# Patient Record
Sex: FEMALE | Race: WHITE | NOT HISPANIC OR LATINO | Employment: OTHER | URBAN - METROPOLITAN AREA
[De-identification: names, ages, dates, MRNs, and addresses within clinical notes are randomized per-mention and may not be internally consistent; named-entity substitution may affect disease eponyms.]

---

## 2017-03-17 ENCOUNTER — COMMUNICATION - HEALTHEAST (OUTPATIENT)
Dept: FAMILY MEDICINE | Facility: CLINIC | Age: 67
End: 2017-03-17

## 2017-08-04 ENCOUNTER — RECORDS - HEALTHEAST (OUTPATIENT)
Dept: ADMINISTRATIVE | Facility: OTHER | Age: 67
End: 2017-08-04

## 2017-10-03 ENCOUNTER — RECORDS - HEALTHEAST (OUTPATIENT)
Dept: ADMINISTRATIVE | Facility: OTHER | Age: 67
End: 2017-10-03

## 2017-10-18 ENCOUNTER — HOSPITAL ENCOUNTER (OUTPATIENT)
Dept: MAMMOGRAPHY | Facility: CLINIC | Age: 67
Discharge: HOME | End: 2017-10-18
Attending: FAMILY MEDICINE

## 2017-10-18 DIAGNOSIS — Z12.31 VISIT FOR SCREENING MAMMOGRAM: ICD-10-CM

## 2017-10-19 ENCOUNTER — COMMUNICATION - HEALTHEAST (OUTPATIENT)
Dept: MAMMOGRAPHY | Facility: CLINIC | Age: 67
End: 2017-10-19

## 2017-10-26 ENCOUNTER — HOSPITAL ENCOUNTER (OUTPATIENT)
Dept: ULTRASOUND IMAGING | Facility: CLINIC | Age: 67
Discharge: HOME | End: 2017-10-26
Attending: FAMILY MEDICINE

## 2017-10-26 ENCOUNTER — HOSPITAL ENCOUNTER (OUTPATIENT)
Dept: MAMMOGRAPHY | Facility: CLINIC | Age: 67
Discharge: HOME | End: 2017-10-26
Attending: FAMILY MEDICINE

## 2017-10-26 DIAGNOSIS — N64.89 BREAST ASYMMETRY: ICD-10-CM

## 2017-11-13 ENCOUNTER — OFFICE VISIT - HEALTHEAST (OUTPATIENT)
Dept: FAMILY MEDICINE | Facility: CLINIC | Age: 67
End: 2017-11-13

## 2017-11-13 ENCOUNTER — RECORDS - HEALTHEAST (OUTPATIENT)
Dept: GENERAL RADIOLOGY | Facility: CLINIC | Age: 67
End: 2017-11-13

## 2017-11-13 DIAGNOSIS — K63.5 COLON POLYPS: ICD-10-CM

## 2017-11-13 DIAGNOSIS — M19.072 PRIMARY OSTEOARTHRITIS, LEFT ANKLE AND FOOT: ICD-10-CM

## 2017-11-13 DIAGNOSIS — Z01.419 WELL WOMAN EXAM: ICD-10-CM

## 2017-11-13 DIAGNOSIS — J30.2 SEASONAL ALLERGIES: ICD-10-CM

## 2017-11-13 DIAGNOSIS — N95.1 MENOPAUSAL SYMPTOMS: ICD-10-CM

## 2017-11-13 DIAGNOSIS — N63.10 LUMP OF BREAST, RIGHT: ICD-10-CM

## 2017-11-13 DIAGNOSIS — M19.072 ARTHRITIS OF FOOT, LEFT: ICD-10-CM

## 2017-11-13 DIAGNOSIS — R05.8 DRY COUGH: ICD-10-CM

## 2017-11-13 DIAGNOSIS — L98.9 SKIN LESION: ICD-10-CM

## 2017-11-13 DIAGNOSIS — L57.0 AK (ACTINIC KERATOSIS): ICD-10-CM

## 2017-11-13 LAB
CHOLEST SERPL-MCNC: 226 MG/DL
FASTING STATUS PATIENT QL REPORTED: YES
HDLC SERPL-MCNC: 54 MG/DL
LDLC SERPL CALC-MCNC: 152 MG/DL
TRIGL SERPL-MCNC: 98 MG/DL

## 2017-11-13 ASSESSMENT — MIFFLIN-ST. JEOR: SCORE: 1186.44

## 2017-11-14 ENCOUNTER — COMMUNICATION - HEALTHEAST (OUTPATIENT)
Dept: FAMILY MEDICINE | Facility: CLINIC | Age: 67
End: 2017-11-14

## 2018-01-09 ENCOUNTER — OFFICE VISIT - HEALTHEAST (OUTPATIENT)
Dept: FAMILY MEDICINE | Facility: CLINIC | Age: 68
End: 2018-01-09

## 2018-01-09 DIAGNOSIS — Z23 NEED FOR VACCINATION: ICD-10-CM

## 2018-01-09 DIAGNOSIS — J20.9 ACUTE BRONCHITIS: ICD-10-CM

## 2018-01-09 DIAGNOSIS — R05.9 COUGH: ICD-10-CM

## 2018-01-09 ASSESSMENT — MIFFLIN-ST. JEOR: SCORE: 1175.56

## 2018-04-30 ENCOUNTER — HOSPITAL ENCOUNTER (OUTPATIENT)
Dept: MAMMOGRAPHY | Facility: CLINIC | Age: 68
Discharge: HOME | End: 2018-04-30
Attending: FAMILY MEDICINE

## 2018-04-30 DIAGNOSIS — N63.10 LUMP OF BREAST, RIGHT: ICD-10-CM

## 2018-10-17 ENCOUNTER — AMBULATORY - HEALTHEAST (OUTPATIENT)
Dept: NURSING | Facility: CLINIC | Age: 68
End: 2018-10-17

## 2018-10-17 DIAGNOSIS — Z23 FLU VACCINE NEED: ICD-10-CM

## 2018-10-29 ENCOUNTER — HOSPITAL ENCOUNTER (OUTPATIENT)
Dept: MAMMOGRAPHY | Facility: CLINIC | Age: 68
Discharge: HOME | End: 2018-10-29
Attending: FAMILY MEDICINE

## 2018-10-29 DIAGNOSIS — Z12.31 VISIT FOR SCREENING MAMMOGRAM: ICD-10-CM

## 2018-11-02 ENCOUNTER — COMMUNICATION - HEALTHEAST (OUTPATIENT)
Dept: MAMMOGRAPHY | Facility: CLINIC | Age: 68
End: 2018-11-02

## 2018-11-06 ENCOUNTER — HOSPITAL ENCOUNTER (OUTPATIENT)
Dept: ULTRASOUND IMAGING | Facility: CLINIC | Age: 68
Discharge: HOME | End: 2018-11-06
Attending: FAMILY MEDICINE

## 2018-11-06 DIAGNOSIS — R92.30 BREAST DENSITY: ICD-10-CM

## 2018-11-15 ENCOUNTER — OFFICE VISIT - HEALTHEAST (OUTPATIENT)
Dept: FAMILY MEDICINE | Facility: CLINIC | Age: 68
End: 2018-11-15

## 2018-11-15 ENCOUNTER — RECORDS - HEALTHEAST (OUTPATIENT)
Dept: GENERAL RADIOLOGY | Facility: CLINIC | Age: 68
End: 2018-11-15

## 2018-11-15 DIAGNOSIS — M25.542 JOINT PAIN IN FINGERS OF LEFT HAND: ICD-10-CM

## 2018-11-15 DIAGNOSIS — M18.12 DEGENERATIVE ARTHRITIS OF THUMB, LEFT: ICD-10-CM

## 2018-11-15 DIAGNOSIS — E78.5 HYPERLIPIDEMIA LDL GOAL <130: ICD-10-CM

## 2018-11-15 DIAGNOSIS — M25.542 PAIN IN JOINTS OF LEFT HAND: ICD-10-CM

## 2018-11-15 DIAGNOSIS — R03.0 ELEVATED BLOOD PRESSURE READING WITHOUT DIAGNOSIS OF HYPERTENSION: ICD-10-CM

## 2018-11-15 DIAGNOSIS — N95.1 MENOPAUSAL SYMPTOMS: ICD-10-CM

## 2018-11-15 DIAGNOSIS — Z00.00 WELCOME TO MEDICARE PREVENTIVE VISIT: ICD-10-CM

## 2018-11-15 DIAGNOSIS — R23.2 HOT FLASHES: ICD-10-CM

## 2018-11-15 DIAGNOSIS — M19.072 ARTHRITIS OF FOOT, LEFT: ICD-10-CM

## 2018-11-15 DIAGNOSIS — M21.612 BUNION, LEFT: ICD-10-CM

## 2018-11-15 DIAGNOSIS — Z13.6 ENCOUNTER FOR SCREENING FOR CARDIOVASCULAR DISORDERS: ICD-10-CM

## 2018-11-15 DIAGNOSIS — L57.0 AK (ACTINIC KERATOSIS): ICD-10-CM

## 2018-11-15 DIAGNOSIS — M72.2 PLANTAR FASCIITIS: ICD-10-CM

## 2018-11-15 DIAGNOSIS — L71.9 ROSACEA: ICD-10-CM

## 2018-11-15 LAB
ALBUMIN SERPL-MCNC: 4.3 G/DL (ref 3.5–5)
ALP SERPL-CCNC: 73 U/L (ref 45–120)
ALT SERPL W P-5'-P-CCNC: 12 U/L (ref 0–45)
ANION GAP SERPL CALCULATED.3IONS-SCNC: 11 MMOL/L (ref 5–18)
AST SERPL W P-5'-P-CCNC: 19 U/L (ref 0–40)
BILIRUB SERPL-MCNC: 0.6 MG/DL (ref 0–1)
BUN SERPL-MCNC: 16 MG/DL (ref 8–22)
C REACTIVE PROTEIN LHE: 0.4 MG/DL (ref 0–0.8)
CALCIUM SERPL-MCNC: 10 MG/DL (ref 8.5–10.5)
CCP AB SER IA-ACNC: 0.9 U/ML
CHLORIDE BLD-SCNC: 104 MMOL/L (ref 98–107)
CHOLEST SERPL-MCNC: 227 MG/DL
CO2 SERPL-SCNC: 27 MMOL/L (ref 22–31)
CREAT SERPL-MCNC: 0.7 MG/DL (ref 0.6–1.1)
ERYTHROCYTE [SEDIMENTATION RATE] IN BLOOD BY WESTERGREN METHOD: 21 MM/HR (ref 0–20)
FASTING STATUS PATIENT QL REPORTED: YES
GFR SERPL CREATININE-BSD FRML MDRD: >60 ML/MIN/1.73M2
GLUCOSE BLD-MCNC: 99 MG/DL (ref 70–125)
HDLC SERPL-MCNC: 58 MG/DL
LDLC SERPL CALC-MCNC: 146 MG/DL
POTASSIUM BLD-SCNC: 4.3 MMOL/L (ref 3.5–5)
PROT SERPL-MCNC: 7.6 G/DL (ref 6–8)
RHEUMATOID FACT SERPL-ACNC: <15 IU/ML (ref 0–30)
SODIUM SERPL-SCNC: 142 MMOL/L (ref 136–145)
TRIGL SERPL-MCNC: 113 MG/DL
TSH SERPL DL<=0.005 MIU/L-ACNC: 2.89 UIU/ML (ref 0.3–5)

## 2018-11-15 ASSESSMENT — MIFFLIN-ST. JEOR: SCORE: 1212.52

## 2018-11-16 LAB
ATRIAL RATE - MUSE: 76 BPM
DIASTOLIC BLOOD PRESSURE - MUSE: NORMAL MMHG
INTERPRETATION ECG - MUSE: NORMAL
P AXIS - MUSE: 29 DEGREES
PR INTERVAL - MUSE: 132 MS
QRS DURATION - MUSE: 82 MS
QT - MUSE: 414 MS
QTC - MUSE: 465 MS
R AXIS - MUSE: 24 DEGREES
SYSTOLIC BLOOD PRESSURE - MUSE: NORMAL MMHG
T AXIS - MUSE: 58 DEGREES
VENTRICULAR RATE- MUSE: 76 BPM

## 2018-11-19 ENCOUNTER — COMMUNICATION - HEALTHEAST (OUTPATIENT)
Dept: FAMILY MEDICINE | Facility: CLINIC | Age: 68
End: 2018-11-19

## 2018-11-19 LAB — ANA SER QL: 1.1 U

## 2018-11-20 ENCOUNTER — COMMUNICATION - HEALTHEAST (OUTPATIENT)
Dept: FAMILY MEDICINE | Facility: CLINIC | Age: 68
End: 2018-11-20

## 2018-11-29 ENCOUNTER — AMBULATORY - HEALTHEAST (OUTPATIENT)
Dept: NURSING | Facility: CLINIC | Age: 68
End: 2018-11-29

## 2018-11-29 ENCOUNTER — COMMUNICATION - HEALTHEAST (OUTPATIENT)
Dept: FAMILY MEDICINE | Facility: CLINIC | Age: 68
End: 2018-11-29

## 2018-11-29 DIAGNOSIS — R03.0 ELEVATED BLOOD PRESSURE READING WITHOUT DIAGNOSIS OF HYPERTENSION: ICD-10-CM

## 2018-12-07 ENCOUNTER — AMBULATORY - HEALTHEAST (OUTPATIENT)
Dept: NURSING | Facility: CLINIC | Age: 68
End: 2018-12-07

## 2018-12-07 ENCOUNTER — AMBULATORY - HEALTHEAST (OUTPATIENT)
Dept: LAB | Facility: CLINIC | Age: 68
End: 2018-12-07

## 2018-12-07 DIAGNOSIS — R03.0 ELEVATED BLOOD PRESSURE READING WITHOUT DIAGNOSIS OF HYPERTENSION: ICD-10-CM

## 2018-12-07 LAB
ANION GAP SERPL CALCULATED.3IONS-SCNC: 12 MMOL/L (ref 5–18)
BUN SERPL-MCNC: 16 MG/DL (ref 8–22)
CALCIUM SERPL-MCNC: 9.6 MG/DL (ref 8.5–10.5)
CHLORIDE BLD-SCNC: 105 MMOL/L (ref 98–107)
CO2 SERPL-SCNC: 25 MMOL/L (ref 22–31)
CREAT SERPL-MCNC: 0.72 MG/DL (ref 0.6–1.1)
GFR SERPL CREATININE-BSD FRML MDRD: >60 ML/MIN/1.73M2
GLUCOSE BLD-MCNC: 104 MG/DL (ref 70–125)
POTASSIUM BLD-SCNC: 3.7 MMOL/L (ref 3.5–5)
SODIUM SERPL-SCNC: 142 MMOL/L (ref 136–145)

## 2018-12-10 ENCOUNTER — COMMUNICATION - HEALTHEAST (OUTPATIENT)
Dept: FAMILY MEDICINE | Facility: CLINIC | Age: 68
End: 2018-12-10

## 2018-12-14 ENCOUNTER — COMMUNICATION - HEALTHEAST (OUTPATIENT)
Dept: FAMILY MEDICINE | Facility: CLINIC | Age: 68
End: 2018-12-14

## 2019-01-22 ENCOUNTER — RECORDS - HEALTHEAST (OUTPATIENT)
Dept: ADMINISTRATIVE | Facility: OTHER | Age: 69
End: 2019-01-22

## 2019-05-09 ENCOUNTER — COMMUNICATION - HEALTHEAST (OUTPATIENT)
Dept: FAMILY MEDICINE | Facility: CLINIC | Age: 69
End: 2019-05-09

## 2019-10-14 ENCOUNTER — RECORDS - HEALTHEAST (OUTPATIENT)
Dept: ADMINISTRATIVE | Facility: OTHER | Age: 69
End: 2019-10-14

## 2019-11-21 ENCOUNTER — HOSPITAL ENCOUNTER (OUTPATIENT)
Dept: MAMMOGRAPHY | Facility: CLINIC | Age: 69
Discharge: HOME | End: 2019-11-21
Attending: FAMILY MEDICINE

## 2019-11-21 DIAGNOSIS — Z12.31 VISIT FOR SCREENING MAMMOGRAM: ICD-10-CM

## 2019-11-29 ENCOUNTER — COMMUNICATION - HEALTHEAST (OUTPATIENT)
Dept: FAMILY MEDICINE | Facility: CLINIC | Age: 69
End: 2019-11-29

## 2019-11-29 DIAGNOSIS — R23.2 HOT FLASHES: ICD-10-CM

## 2019-12-27 ENCOUNTER — OFFICE VISIT - HEALTHEAST (OUTPATIENT)
Dept: FAMILY MEDICINE | Facility: CLINIC | Age: 69
End: 2019-12-27

## 2019-12-27 DIAGNOSIS — D04.30 SQUAMOUS CELL CARCINOMA IN SITU OF SKIN OF FACE: ICD-10-CM

## 2019-12-27 DIAGNOSIS — K63.5 POLYP OF COLON, UNSPECIFIED PART OF COLON, UNSPECIFIED TYPE: ICD-10-CM

## 2019-12-27 DIAGNOSIS — Z13.220 LIPID SCREENING: ICD-10-CM

## 2019-12-27 DIAGNOSIS — M18.12 DEGENERATIVE ARTHRITIS OF THUMB, LEFT: ICD-10-CM

## 2019-12-27 DIAGNOSIS — Z78.0 POSTMENOPAUSAL STATUS: ICD-10-CM

## 2019-12-27 DIAGNOSIS — R03.0 ELEVATED BLOOD PRESSURE READING WITHOUT DIAGNOSIS OF HYPERTENSION: ICD-10-CM

## 2019-12-27 DIAGNOSIS — Z11.59 ENCOUNTER FOR HEPATITIS C SCREENING TEST FOR LOW RISK PATIENT: ICD-10-CM

## 2019-12-27 DIAGNOSIS — L71.9 ROSACEA: ICD-10-CM

## 2019-12-27 DIAGNOSIS — M79.671 RIGHT FOOT PAIN: ICD-10-CM

## 2019-12-27 DIAGNOSIS — I10 ESSENTIAL HYPERTENSION: ICD-10-CM

## 2019-12-27 DIAGNOSIS — Z00.00 ENCOUNTER FOR MEDICARE ANNUAL WELLNESS EXAM: ICD-10-CM

## 2019-12-27 LAB
ALBUMIN SERPL-MCNC: 4 G/DL (ref 3.5–5)
ALP SERPL-CCNC: 59 U/L (ref 45–120)
ALT SERPL W P-5'-P-CCNC: 11 U/L (ref 0–45)
ANION GAP SERPL CALCULATED.3IONS-SCNC: 13 MMOL/L (ref 5–18)
AST SERPL W P-5'-P-CCNC: 16 U/L (ref 0–40)
BILIRUB SERPL-MCNC: 0.6 MG/DL (ref 0–1)
BUN SERPL-MCNC: 17 MG/DL (ref 8–22)
CALCIUM SERPL-MCNC: 9.2 MG/DL (ref 8.5–10.5)
CHLORIDE BLD-SCNC: 106 MMOL/L (ref 98–107)
CHOLEST SERPL-MCNC: 195 MG/DL
CO2 SERPL-SCNC: 21 MMOL/L (ref 22–31)
CREAT SERPL-MCNC: 0.69 MG/DL (ref 0.6–1.1)
FASTING STATUS PATIENT QL REPORTED: NORMAL
GFR SERPL CREATININE-BSD FRML MDRD: >60 ML/MIN/1.73M2
GLUCOSE BLD-MCNC: 81 MG/DL (ref 70–125)
HDLC SERPL-MCNC: 55 MG/DL
LDLC SERPL CALC-MCNC: 120 MG/DL
POTASSIUM BLD-SCNC: 4.1 MMOL/L (ref 3.5–5)
PROT SERPL-MCNC: 6.8 G/DL (ref 6–8)
SODIUM SERPL-SCNC: 140 MMOL/L (ref 136–145)
TRIGL SERPL-MCNC: 98 MG/DL

## 2019-12-27 ASSESSMENT — MIFFLIN-ST. JEOR: SCORE: 1204.24

## 2019-12-30 ENCOUNTER — AMBULATORY - HEALTHEAST (OUTPATIENT)
Dept: SCHEDULING | Facility: CLINIC | Age: 69
End: 2019-12-30

## 2019-12-30 DIAGNOSIS — Z78.0 POSTMENOPAUSAL STATUS: ICD-10-CM

## 2019-12-30 LAB — HCV AB SERPL QL IA: NEGATIVE

## 2020-01-16 ENCOUNTER — AMBULATORY - HEALTHEAST (OUTPATIENT)
Dept: NURSING | Facility: CLINIC | Age: 70
End: 2020-01-16

## 2020-01-16 DIAGNOSIS — I10 ESSENTIAL HYPERTENSION: ICD-10-CM

## 2020-01-20 ENCOUNTER — COMMUNICATION - HEALTHEAST (OUTPATIENT)
Dept: FAMILY MEDICINE | Facility: CLINIC | Age: 70
End: 2020-01-20

## 2020-01-21 ENCOUNTER — OFFICE VISIT - HEALTHEAST (OUTPATIENT)
Dept: PODIATRY | Facility: CLINIC | Age: 70
End: 2020-01-21

## 2020-01-21 ENCOUNTER — SURGERY - HEALTHEAST (OUTPATIENT)
Dept: PODIATRY | Facility: CLINIC | Age: 70
End: 2020-01-21

## 2020-01-21 DIAGNOSIS — M79.671 RIGHT FOOT PAIN: ICD-10-CM

## 2020-01-21 DIAGNOSIS — M77.31 CALCANEAL SPUR, RIGHT: ICD-10-CM

## 2020-01-21 ASSESSMENT — MIFFLIN-ST. JEOR: SCORE: 1203.34

## 2020-01-22 ENCOUNTER — COMMUNICATION - HEALTHEAST (OUTPATIENT)
Dept: ADMINISTRATIVE | Facility: CLINIC | Age: 70
End: 2020-01-22

## 2020-01-30 ENCOUNTER — COMMUNICATION - HEALTHEAST (OUTPATIENT)
Dept: FAMILY MEDICINE | Facility: CLINIC | Age: 70
End: 2020-01-30

## 2020-02-03 ENCOUNTER — AMBULATORY - HEALTHEAST (OUTPATIENT)
Dept: ADMINISTRATIVE | Facility: CLINIC | Age: 70
End: 2020-02-03

## 2020-02-05 ENCOUNTER — COMMUNICATION - HEALTHEAST (OUTPATIENT)
Dept: FAMILY MEDICINE | Facility: CLINIC | Age: 70
End: 2020-02-05

## 2020-02-12 ASSESSMENT — MIFFLIN-ST. JEOR: SCORE: 1186.33

## 2020-02-14 ENCOUNTER — RECORDS - HEALTHEAST (OUTPATIENT)
Dept: ADMINISTRATIVE | Facility: OTHER | Age: 70
End: 2020-02-14

## 2020-02-14 ENCOUNTER — OFFICE VISIT - HEALTHEAST (OUTPATIENT)
Dept: FAMILY MEDICINE | Facility: CLINIC | Age: 70
End: 2020-02-14

## 2020-02-14 ENCOUNTER — ANESTHESIA - HEALTHEAST (OUTPATIENT)
Dept: SURGERY | Facility: AMBULATORY SURGERY CENTER | Age: 70
End: 2020-02-14

## 2020-02-14 DIAGNOSIS — N95.1 MENOPAUSAL SYMPTOMS: ICD-10-CM

## 2020-02-14 DIAGNOSIS — I10 ESSENTIAL HYPERTENSION: ICD-10-CM

## 2020-02-14 DIAGNOSIS — M77.31 CALCANEAL SPUR, RIGHT: ICD-10-CM

## 2020-02-14 DIAGNOSIS — Z01.818 PRE-OP EXAM: ICD-10-CM

## 2020-02-14 LAB
ANION GAP SERPL CALCULATED.3IONS-SCNC: 9 MMOL/L (ref 5–18)
BUN SERPL-MCNC: 21 MG/DL (ref 8–22)
CALCIUM SERPL-MCNC: 9.4 MG/DL (ref 8.5–10.5)
CHLORIDE BLD-SCNC: 105 MMOL/L (ref 98–107)
CO2 SERPL-SCNC: 28 MMOL/L (ref 22–31)
CREAT SERPL-MCNC: 0.82 MG/DL (ref 0.6–1.1)
ERYTHROCYTE [DISTWIDTH] IN BLOOD BY AUTOMATED COUNT: 12.4 % (ref 11–14.5)
GFR SERPL CREATININE-BSD FRML MDRD: >60 ML/MIN/1.73M2
GLUCOSE BLD-MCNC: 118 MG/DL (ref 70–125)
HCT VFR BLD AUTO: 40.8 % (ref 35–47)
HGB BLD-MCNC: 13.7 G/DL (ref 12–16)
MCH RBC QN AUTO: 30.2 PG (ref 27–34)
MCHC RBC AUTO-ENTMCNC: 33.4 G/DL (ref 32–36)
MCV RBC AUTO: 90 FL (ref 80–100)
PLATELET # BLD AUTO: 205 THOU/UL (ref 140–440)
PMV BLD AUTO: 7.9 FL (ref 7–10)
POTASSIUM BLD-SCNC: 3.9 MMOL/L (ref 3.5–5)
RBC # BLD AUTO: 4.52 MILL/UL (ref 3.8–5.4)
SODIUM SERPL-SCNC: 142 MMOL/L (ref 136–145)
WBC: 8.2 THOU/UL (ref 4–11)

## 2020-02-14 RX ORDER — VITAMIN E 268 MG
400 CAPSULE ORAL DAILY
Status: SHIPPED | COMMUNITY
Start: 2020-02-14 | End: 2022-11-07

## 2020-02-14 ASSESSMENT — MIFFLIN-ST. JEOR: SCORE: 1206.06

## 2020-02-15 LAB
ATRIAL RATE - MUSE: 69 BPM
DIASTOLIC BLOOD PRESSURE - MUSE: NORMAL
INTERPRETATION ECG - MUSE: NORMAL
P AXIS - MUSE: 27 DEGREES
PR INTERVAL - MUSE: 144 MS
QRS DURATION - MUSE: 86 MS
QT - MUSE: 422 MS
QTC - MUSE: 452 MS
R AXIS - MUSE: 39 DEGREES
SYSTOLIC BLOOD PRESSURE - MUSE: NORMAL
T AXIS - MUSE: 10 DEGREES
VENTRICULAR RATE- MUSE: 69 BPM

## 2020-02-17 ENCOUNTER — SURGERY - HEALTHEAST (OUTPATIENT)
Dept: SURGERY | Facility: AMBULATORY SURGERY CENTER | Age: 70
End: 2020-02-17

## 2020-02-17 ASSESSMENT — MIFFLIN-ST. JEOR: SCORE: 1186.33

## 2020-02-18 ENCOUNTER — COMMUNICATION - HEALTHEAST (OUTPATIENT)
Dept: VASCULAR SURGERY | Facility: CLINIC | Age: 70
End: 2020-02-18

## 2020-02-26 ENCOUNTER — OFFICE VISIT - HEALTHEAST (OUTPATIENT)
Dept: PODIATRY | Facility: CLINIC | Age: 70
End: 2020-02-26

## 2020-02-26 DIAGNOSIS — M77.31 CALCANEAL SPUR, RIGHT: ICD-10-CM

## 2020-03-04 ENCOUNTER — OFFICE VISIT - HEALTHEAST (OUTPATIENT)
Dept: PODIATRY | Facility: CLINIC | Age: 70
End: 2020-03-04

## 2020-03-04 DIAGNOSIS — M77.31 CALCANEAL SPUR, RIGHT: ICD-10-CM

## 2020-05-08 ENCOUNTER — COMMUNICATION - HEALTHEAST (OUTPATIENT)
Dept: FAMILY MEDICINE | Facility: CLINIC | Age: 70
End: 2020-05-08

## 2020-08-06 ENCOUNTER — RECORDS - HEALTHEAST (OUTPATIENT)
Dept: ADMINISTRATIVE | Facility: OTHER | Age: 70
End: 2020-08-06

## 2020-08-17 ENCOUNTER — COMMUNICATION - HEALTHEAST (OUTPATIENT)
Dept: NURSING | Facility: CLINIC | Age: 70
End: 2020-08-17

## 2020-08-17 DIAGNOSIS — I10 ESSENTIAL HYPERTENSION: ICD-10-CM

## 2020-09-08 ENCOUNTER — RECORDS - HEALTHEAST (OUTPATIENT)
Dept: ADMINISTRATIVE | Facility: OTHER | Age: 70
End: 2020-09-08

## 2020-09-18 ENCOUNTER — VIRTUAL VISIT (OUTPATIENT)
Dept: FAMILY MEDICINE | Facility: OTHER | Age: 70
End: 2020-09-18
Payer: COMMERCIAL

## 2020-09-18 PROCEDURE — 99421 OL DIG E/M SVC 5-10 MIN: CPT | Performed by: PHYSICIAN ASSISTANT

## 2020-09-19 ENCOUNTER — AMBULATORY - HEALTHEAST (OUTPATIENT)
Dept: FAMILY MEDICINE | Facility: CLINIC | Age: 70
End: 2020-09-19

## 2020-09-19 DIAGNOSIS — Z20.822 SUSPECTED COVID-19 VIRUS INFECTION: ICD-10-CM

## 2020-09-19 NOTE — PROGRESS NOTES
"Date: 2020 15:16:20  Clinician: Gabrielle Deng  Clinician NPI: [id number]  Patient: Yennifer Castro  Patient : 1950  Patient Address[facility] [place] [address]  Patient Phone: [phone]  Visit Protocol: URI  Patient Summary:  Yennifer is a 69 year old ( : 1950 ) female who initiated a [facility] Visit for COVID-19 (Coronavirus) evaluation and screening. When asked the question \"Please sign me up to receive news, health information and promotions from [facility].\", Yennifer responded \"Yes\".    When asked when her symptoms started, Yennifer reported that she does not have any symptoms.   She denies taking antibiotic medication in the past month and having recent facial or sinus surgery in the past 60 days.    Pertinent COVID-19 (Coronavirus) information  In the past 14 days, Yennifer has not worked in a congregate living setting.   She does not work or volunteer as healthcare worker or a  and does not work or volunteer in a healthcare facility.   Yennifer also has not lived in a congregate living setting in the past 14 days. She does not live with a healthcare worker.   Yennifer has had a close contact with a laboratory-confirmed COVID-19 patient in the last 14 days. Additional information about contact with COVID-19 (Coronavirus) patient as reported by the patient (free text): A friend and his girlfriend who were both confirmed with covid 19. Both myself and my  were outside in [facility] in a group situation on Sat.Oct.12 for several hours.   Patient reported they are not living in the same household with a COVID-19 positive patient.  Patient was in an enclosed space for greater than 15 minutes with a COVID-19 patient.  Since 2019, Yennifer and has not had upper respiratory infection or influenza-like illness. Has not been diagnosed with lab-confirmed COVID-19 test   Pertinent medical history  Yennifer does not get yeast infections when she " takes antibiotics.   Yennifer does not need a return to work/school note.   Weight: 159 lbs   Yennifer does not smoke or use smokeless tobacco.   Weight: 159 lbs    MEDICATIONS: estradiol-norgestimate oral, losartan oral, ALLERGIES: Penicillins, Sulfa (Sulfonamide Antibiotics)  Clinician Response:  Dear Yennifer,   Based on your exposure to COVID-19 (coronavirus), we would like to test you for this virus.  1. Please call [phone] to schedule your visit. Explain that you were referred by [facility] to have a COVID-19 test. Be ready to share your [facility] visit ID number.  The following will serve as your written order for this COVID Test, ordered by me, for the indication of suspected COVID [Z20.828]: The test will be ordered in [email or web address], our electronic health record, after you are scheduled. It will show as ordered and authorized by Rashi Meng MD.  Order: COVID-19 (coronavirus) PCR for ASYMPTOMATIC EXPOSURE testing from [facility].  If you know you have had close contact with someone who tested positive, you should be quarantined for 14 days after this exposure. You should stay in quarantine for the14 days even if the covid test is negative, the optimal time to test after exposure is 5-7 days from the exposure  Quarantine means   What should I do?  For safety, it's very important to follow these rules. Do this for 14 days after the date you were last exposed to the virus..  Stay home and away from others. Don't go to school or anywhere else. Generally quarantine means staying home from work but there are some exceptions to this. Please contact your workplace.   No hugging, kissing or shaking hands.  Don't let anyone visit.  Cover your mouth and nose with a mask, tissue or washcloth to avoid spreading germs.  Wash your hands and face often. Use soap and water.  What are the symptoms of COVID-19?  The most common symptoms are cough, fever and trouble breathing. Less common symptoms include headache, body aches, fatigue  (feeling very tired), chills, sore throat, stuffy or runny nose, diarrhea (loose poop), loss of taste or smell, belly pain, and nausea or vomiting (feeling sick to your stomach or throwing up).  After 14 days, if you have still don't have symptoms, you likely don't have this virus.  If you develop symptoms, follow these guidelines.  If you're normally healthy: Please start another [facility] visit to report your symptoms. Go to [email or web address].  If you have a serious health problem (like cancer, heart failure, an organ transplant or kidney disease): Call your specialty clinic. Let them know that you might have COVID-19.  2. When it's time for your COVID test:  Stay at least 6 feet away from others. (If someone will drive you to your test, stay in the backseat, as far away from the  as you can.)  Cover your mouth and nose with a mask, tissue or washcloth.  Go straight to the testing site. Don't make any stops on the way there or back.  Please note  Caregivers in these groups are at risk for severe illness due to COVID-19:  o People 65 years and older  o People who live in a nursing home or long-term care facility  o People with chronic disease (lung, heart, cancer, diabetes, kidney, liver, immunologic)  o People who have a weakened immune system, including those who:  Are in cancer treatment  Take medicine that weakens the immune system, such as corticosteroids  Had a bone marrow or organ transplant  Have an immune deficiency  Have poorly controlled HIV or AIDS  Are obese (body mass index of 40 or higher)  Smoke regularly  Where can I get more information?   [facility] -- About COVID-19: [email or web address]  CDC -- What to Do If You're Sick: www.cdc.gov/coronavirus/2019-ncov/about/steps-when-sick.html  CDC -- Ending Home Isolation: www.cdc.gov/coronavirus/2019-ncov/hcp/disposition-in-home-patients.html  CDC -- Caring for Someone: www.cdc.gov/coronavirus/2019-ncov/if-you-are-sick/care-for-someone.html   [facility] -- Interim Guidance for Hospital Discharge to Home: www.health.[facility].[place]/diseases/coronavirus/hcp/hospdischarge.pdf  [facility] clinical trials (COVID-19 research studies): clinicalaffairs.[facility]/n-clinical-trials  Below are the COVID-19 hotlines at the [place] Department of Health ([facility]). Interpreters are available.  For health questions: Call 142-658-6958 or 1-701.639.6444 (7 a.m. to 7 p.m.)  For questions about schools and childcare: Call 139-771-0014 or 1-528.752.2022 (7 a.m. to 7 p.m.)    Diagnosis: Contact with and (suspected) exposure to other viral communicable diseases  Diagnosis ICD: Z20.828

## 2020-09-21 ENCOUNTER — COMMUNICATION - HEALTHEAST (OUTPATIENT)
Dept: SCHEDULING | Facility: CLINIC | Age: 70
End: 2020-09-21

## 2020-10-09 ENCOUNTER — OFFICE VISIT - HEALTHEAST (OUTPATIENT)
Dept: FAMILY MEDICINE | Facility: CLINIC | Age: 70
End: 2020-10-09

## 2020-10-09 DIAGNOSIS — M19.041 ARTHRITIS OF BOTH HANDS: ICD-10-CM

## 2020-10-09 DIAGNOSIS — Z13.220 LIPID SCREENING: ICD-10-CM

## 2020-10-09 DIAGNOSIS — M18.12 DEGENERATIVE ARTHRITIS OF THUMB, LEFT: ICD-10-CM

## 2020-10-09 DIAGNOSIS — L30.9 ECZEMA OF SCALP: ICD-10-CM

## 2020-10-09 DIAGNOSIS — R23.2 HOT FLASHES: ICD-10-CM

## 2020-10-09 DIAGNOSIS — Z00.00 ENCOUNTER FOR ANNUAL WELLNESS EXAM IN MEDICARE PATIENT: ICD-10-CM

## 2020-10-09 DIAGNOSIS — M19.042 ARTHRITIS OF BOTH HANDS: ICD-10-CM

## 2020-10-09 LAB
ALBUMIN SERPL-MCNC: 4.3 G/DL (ref 3.5–5)
ALP SERPL-CCNC: 68 U/L (ref 45–120)
ALT SERPL W P-5'-P-CCNC: 13 U/L (ref 0–45)
ANION GAP SERPL CALCULATED.3IONS-SCNC: 9 MMOL/L (ref 5–18)
AST SERPL W P-5'-P-CCNC: 18 U/L (ref 0–40)
BILIRUB SERPL-MCNC: 0.6 MG/DL (ref 0–1)
BUN SERPL-MCNC: 19 MG/DL (ref 8–22)
CALCIUM SERPL-MCNC: 9.5 MG/DL (ref 8.5–10.5)
CHLORIDE BLD-SCNC: 103 MMOL/L (ref 98–107)
CHOLEST SERPL-MCNC: 222 MG/DL
CO2 SERPL-SCNC: 28 MMOL/L (ref 22–31)
CREAT SERPL-MCNC: 0.8 MG/DL (ref 0.6–1.1)
FASTING STATUS PATIENT QL REPORTED: YES
GFR SERPL CREATININE-BSD FRML MDRD: >60 ML/MIN/1.73M2
GLUCOSE BLD-MCNC: 108 MG/DL (ref 70–125)
HDLC SERPL-MCNC: 55 MG/DL
LDLC SERPL CALC-MCNC: 145 MG/DL
POTASSIUM BLD-SCNC: 4 MMOL/L (ref 3.5–5)
PROT SERPL-MCNC: 7.2 G/DL (ref 6–8)
SODIUM SERPL-SCNC: 140 MMOL/L (ref 136–145)
TRIGL SERPL-MCNC: 110 MG/DL

## 2020-10-09 RX ORDER — NAPROXEN 500 MG/1
500 TABLET ORAL 2 TIMES DAILY WITH MEALS
Qty: 60 TABLET | Refills: 2 | Status: SHIPPED | OUTPATIENT
Start: 2020-10-09 | End: 2022-11-30

## 2020-10-09 RX ORDER — ESTRADIOL 0.5 MG/1
0.25 TABLET ORAL DAILY
Qty: 45 TABLET | Refills: 11 | Status: SHIPPED | OUTPATIENT
Start: 2020-10-09 | End: 2021-12-03

## 2020-10-09 RX ORDER — BETAMETHASONE VALERATE 1.2 MG/G
AEROSOL, FOAM TOPICAL
Qty: 50 G | Refills: 2 | Status: SHIPPED | OUTPATIENT
Start: 2020-10-09 | End: 2022-11-07

## 2020-10-09 ASSESSMENT — MIFFLIN-ST. JEOR: SCORE: 1216.38

## 2020-10-12 LAB
C REACTIVE PROTEIN LHE: 1 MG/DL (ref 0–0.8)
URATE SERPL-MCNC: 6.4 MG/DL (ref 2–7.5)

## 2020-10-13 LAB — CCP AB SER IA-ACNC: <0.5 U/ML

## 2020-10-14 ENCOUNTER — AMBULATORY - HEALTHEAST (OUTPATIENT)
Dept: FAMILY MEDICINE | Facility: CLINIC | Age: 70
End: 2020-10-14

## 2020-10-14 ENCOUNTER — COMMUNICATION - HEALTHEAST (OUTPATIENT)
Dept: FAMILY MEDICINE | Facility: CLINIC | Age: 70
End: 2020-10-14

## 2020-10-14 DIAGNOSIS — L40.50 PSORIASIS WITH ARTHROPATHY (H): ICD-10-CM

## 2020-10-14 DIAGNOSIS — L40.9 PSORIASIS: ICD-10-CM

## 2020-10-21 ENCOUNTER — AMBULATORY - HEALTHEAST (OUTPATIENT)
Dept: LAB | Facility: CLINIC | Age: 70
End: 2020-10-21

## 2020-10-21 DIAGNOSIS — L40.50 PSORIASIS WITH ARTHROPATHY (H): ICD-10-CM

## 2020-10-28 ENCOUNTER — OFFICE VISIT - HEALTHEAST (OUTPATIENT)
Dept: PODIATRY | Facility: CLINIC | Age: 70
End: 2020-10-28

## 2020-10-28 DIAGNOSIS — M79.671 RIGHT FOOT PAIN: ICD-10-CM

## 2020-10-28 LAB
B LOCUS: NORMAL
B27TEST METHOD: NORMAL

## 2020-10-28 ASSESSMENT — MIFFLIN-ST. JEOR: SCORE: 1216.38

## 2020-11-06 ENCOUNTER — OFFICE VISIT - HEALTHEAST (OUTPATIENT)
Dept: PHYSICAL THERAPY | Facility: REHABILITATION | Age: 70
End: 2020-11-06

## 2020-11-06 DIAGNOSIS — M79.671 CHRONIC FOOT PAIN, RIGHT: ICD-10-CM

## 2020-11-06 DIAGNOSIS — M79.671 RIGHT FOOT PAIN: ICD-10-CM

## 2020-11-06 DIAGNOSIS — G89.29 CHRONIC FOOT PAIN, RIGHT: ICD-10-CM

## 2020-11-20 ENCOUNTER — OFFICE VISIT - HEALTHEAST (OUTPATIENT)
Dept: PHYSICAL THERAPY | Facility: REHABILITATION | Age: 70
End: 2020-11-20

## 2020-11-20 DIAGNOSIS — M79.671 RIGHT FOOT PAIN: ICD-10-CM

## 2020-11-20 DIAGNOSIS — M79.671 CHRONIC FOOT PAIN, RIGHT: ICD-10-CM

## 2020-11-20 DIAGNOSIS — G89.29 CHRONIC FOOT PAIN, RIGHT: ICD-10-CM

## 2020-11-23 ENCOUNTER — HOSPITAL ENCOUNTER (OUTPATIENT)
Dept: MAMMOGRAPHY | Facility: CLINIC | Age: 70
Discharge: HOME | End: 2020-11-23
Attending: FAMILY MEDICINE

## 2020-11-23 DIAGNOSIS — Z12.31 VISIT FOR SCREENING MAMMOGRAM: ICD-10-CM

## 2020-11-24 ENCOUNTER — OFFICE VISIT - HEALTHEAST (OUTPATIENT)
Dept: PHYSICAL THERAPY | Facility: REHABILITATION | Age: 70
End: 2020-11-24

## 2020-11-24 DIAGNOSIS — M79.671 CHRONIC FOOT PAIN, RIGHT: ICD-10-CM

## 2020-11-24 DIAGNOSIS — M79.671 RIGHT FOOT PAIN: ICD-10-CM

## 2020-11-24 DIAGNOSIS — G89.29 CHRONIC FOOT PAIN, RIGHT: ICD-10-CM

## 2020-12-04 ENCOUNTER — OFFICE VISIT - HEALTHEAST (OUTPATIENT)
Dept: PHYSICAL THERAPY | Facility: REHABILITATION | Age: 70
End: 2020-12-04

## 2020-12-04 DIAGNOSIS — M79.671 CHRONIC FOOT PAIN, RIGHT: ICD-10-CM

## 2020-12-04 DIAGNOSIS — M79.671 RIGHT FOOT PAIN: ICD-10-CM

## 2020-12-04 DIAGNOSIS — G89.29 CHRONIC FOOT PAIN, RIGHT: ICD-10-CM

## 2021-02-09 ENCOUNTER — COMMUNICATION - HEALTHEAST (OUTPATIENT)
Dept: FAMILY MEDICINE | Facility: CLINIC | Age: 71
End: 2021-02-09

## 2021-02-09 DIAGNOSIS — I10 ESSENTIAL HYPERTENSION: ICD-10-CM

## 2021-02-12 ENCOUNTER — COMMUNICATION - HEALTHEAST (OUTPATIENT)
Dept: SCHEDULING | Facility: CLINIC | Age: 71
End: 2021-02-12

## 2021-02-12 ENCOUNTER — OFFICE VISIT - HEALTHEAST (OUTPATIENT)
Dept: FAMILY MEDICINE | Facility: CLINIC | Age: 71
End: 2021-02-12

## 2021-02-12 DIAGNOSIS — J02.9 VIRAL PHARYNGITIS: ICD-10-CM

## 2021-02-12 DIAGNOSIS — J02.9 SORE THROAT: ICD-10-CM

## 2021-02-12 LAB
DEPRECATED S PYO AG THROAT QL EIA: NORMAL
GROUP A STREP BY PCR: NORMAL

## 2021-03-04 ENCOUNTER — HOSPITAL ENCOUNTER (OUTPATIENT)
Dept: ULTRASOUND IMAGING | Facility: HOSPITAL | Age: 71
Discharge: HOME | End: 2021-03-04
Attending: FAMILY MEDICINE

## 2021-03-04 ENCOUNTER — OFFICE VISIT - HEALTHEAST (OUTPATIENT)
Dept: FAMILY MEDICINE | Facility: CLINIC | Age: 71
End: 2021-03-04

## 2021-03-04 DIAGNOSIS — R59.0 LEFT CERVICAL LYMPHADENOPATHY: ICD-10-CM

## 2021-03-04 DIAGNOSIS — I10 ESSENTIAL HYPERTENSION: ICD-10-CM

## 2021-03-04 LAB
DEPRECATED S PYO AG THROAT QL EIA: NORMAL
GROUP A STREP BY PCR: NORMAL
MONOCYTES NFR BLD AUTO: NEGATIVE %

## 2021-03-04 ASSESSMENT — MIFFLIN-ST. JEOR: SCORE: 1239.06

## 2021-03-09 ENCOUNTER — COMMUNICATION - HEALTHEAST (OUTPATIENT)
Dept: FAMILY MEDICINE | Facility: CLINIC | Age: 71
End: 2021-03-09

## 2021-03-09 ENCOUNTER — AMBULATORY - HEALTHEAST (OUTPATIENT)
Dept: ULTRASOUND IMAGING | Facility: HOSPITAL | Age: 71
End: 2021-03-09

## 2021-03-09 ENCOUNTER — HOSPITAL ENCOUNTER (OUTPATIENT)
Dept: CT IMAGING | Facility: CLINIC | Age: 71
Discharge: HOME | End: 2021-03-09
Attending: FAMILY MEDICINE

## 2021-03-09 DIAGNOSIS — R59.0 LEFT CERVICAL LYMPHADENOPATHY: ICD-10-CM

## 2021-03-09 DIAGNOSIS — Z11.59 ENCOUNTER FOR SCREENING FOR OTHER VIRAL DISEASES: ICD-10-CM

## 2021-03-10 ENCOUNTER — AMBULATORY - HEALTHEAST (OUTPATIENT)
Dept: FAMILY MEDICINE | Facility: CLINIC | Age: 71
End: 2021-03-10

## 2021-03-10 DIAGNOSIS — R59.0 LEFT CERVICAL LYMPHADENOPATHY: ICD-10-CM

## 2021-03-12 ENCOUNTER — AMBULATORY - HEALTHEAST (OUTPATIENT)
Dept: FAMILY MEDICINE | Facility: CLINIC | Age: 71
End: 2021-03-12

## 2021-03-12 DIAGNOSIS — R59.0 LEFT CERVICAL LYMPHADENOPATHY: ICD-10-CM

## 2021-03-13 LAB
SARS-COV-2 PCR COMMENT: NORMAL
SARS-COV-2 RNA SPEC QL NAA+PROBE: NEGATIVE
SARS-COV-2 VIRUS SPECIMEN SOURCE: NORMAL

## 2021-03-14 ENCOUNTER — COMMUNICATION - HEALTHEAST (OUTPATIENT)
Dept: SCHEDULING | Facility: CLINIC | Age: 71
End: 2021-03-14

## 2021-03-16 ENCOUNTER — HOSPITAL ENCOUNTER (OUTPATIENT)
Dept: ULTRASOUND IMAGING | Facility: HOSPITAL | Age: 71
Discharge: HOME | End: 2021-03-16
Attending: FAMILY MEDICINE | Admitting: RADIOLOGY

## 2021-03-16 DIAGNOSIS — R59.0 LEFT CERVICAL LYMPHADENOPATHY: ICD-10-CM

## 2021-03-22 LAB
CAP COMMENT: NORMAL
LAB AP CHARGES (HE HISTORICAL CONVERSION): NORMAL
LAB AP INITIAL CYTO EVAL (HE HISTORICAL CONVERSION): NORMAL
LAB MED GENERAL PATH INTERP (HE HISTORICAL CONVERSION): NORMAL
PATH REPORT.COMMENTS IMP SPEC: NORMAL
PATH REPORT.COMMENTS IMP SPEC: NORMAL
PATH REPORT.FINAL DX SPEC: NORMAL
PATH REPORT.MICROSCOPIC SPEC OTHER STN: NORMAL
PATH REPORT.RELEVANT HX SPEC: NORMAL
SPECIMEN DESCRIPTION: NORMAL

## 2021-03-23 ENCOUNTER — AMBULATORY - HEALTHEAST (OUTPATIENT)
Dept: FAMILY MEDICINE | Facility: CLINIC | Age: 71
End: 2021-03-23

## 2021-03-23 DIAGNOSIS — R59.0 LEFT CERVICAL LYMPHADENOPATHY: ICD-10-CM

## 2021-03-26 ENCOUNTER — OFFICE VISIT - HEALTHEAST (OUTPATIENT)
Dept: OTOLARYNGOLOGY | Facility: CLINIC | Age: 71
End: 2021-03-26

## 2021-03-26 DIAGNOSIS — R22.1 NECK MASS: ICD-10-CM

## 2021-03-29 ENCOUNTER — COMMUNICATION - HEALTHEAST (OUTPATIENT)
Dept: OTOLARYNGOLOGY | Facility: CLINIC | Age: 71
End: 2021-03-29

## 2021-03-29 ENCOUNTER — HOSPITAL ENCOUNTER (OUTPATIENT)
Dept: MRI IMAGING | Facility: HOSPITAL | Age: 71
Discharge: HOME | End: 2021-03-29
Attending: OTOLARYNGOLOGY

## 2021-03-29 DIAGNOSIS — F40.240 CLAUSTROPHOBIA: ICD-10-CM

## 2021-03-29 DIAGNOSIS — R22.1 NECK MASS: ICD-10-CM

## 2021-03-30 ENCOUNTER — COMMUNICATION - HEALTHEAST (OUTPATIENT)
Dept: FAMILY MEDICINE | Facility: CLINIC | Age: 71
End: 2021-03-30

## 2021-03-30 ENCOUNTER — COMMUNICATION - HEALTHEAST (OUTPATIENT)
Dept: OTOLARYNGOLOGY | Facility: CLINIC | Age: 71
End: 2021-03-30

## 2021-03-30 DIAGNOSIS — Z01.818 PREPROCEDURAL EXAMINATION: ICD-10-CM

## 2021-03-30 RX ORDER — DIAZEPAM 5 MG
TABLET ORAL
Qty: 1 TABLET | Refills: 0 | Status: SHIPPED | OUTPATIENT
Start: 2021-03-30 | End: 2021-09-15

## 2021-03-31 ENCOUNTER — AMBULATORY - HEALTHEAST (OUTPATIENT)
Dept: OTOLARYNGOLOGY | Facility: CLINIC | Age: 71
End: 2021-03-31

## 2021-03-31 DIAGNOSIS — F40.240 CLAUSTROPHOBIA: ICD-10-CM

## 2021-03-31 DIAGNOSIS — F41.9 ANXIETY: ICD-10-CM

## 2021-04-01 ENCOUNTER — HOSPITAL ENCOUNTER (OUTPATIENT)
Dept: MRI IMAGING | Facility: HOSPITAL | Age: 71
Discharge: HOME | End: 2021-04-01
Attending: OTOLARYNGOLOGY

## 2021-04-01 DIAGNOSIS — R22.1 NECK MASS: ICD-10-CM

## 2021-04-11 ENCOUNTER — COMMUNICATION - HEALTHEAST (OUTPATIENT)
Dept: FAMILY MEDICINE | Facility: CLINIC | Age: 71
End: 2021-04-11

## 2021-04-11 ENCOUNTER — AMBULATORY - HEALTHEAST (OUTPATIENT)
Dept: FAMILY MEDICINE | Facility: CLINIC | Age: 71
End: 2021-04-11

## 2021-04-11 DIAGNOSIS — R92.8 OTHER ABNORMAL AND INCONCLUSIVE FINDINGS ON DIAGNOSTIC IMAGING OF BREAST: ICD-10-CM

## 2021-04-11 DIAGNOSIS — R59.1 LYMPHADENOPATHY: ICD-10-CM

## 2021-04-14 ENCOUNTER — HOSPITAL ENCOUNTER (OUTPATIENT)
Dept: MAMMOGRAPHY | Facility: CLINIC | Age: 71
Discharge: HOME | End: 2021-04-14
Attending: FAMILY MEDICINE

## 2021-04-14 ENCOUNTER — HOSPITAL ENCOUNTER (OUTPATIENT)
Dept: ULTRASOUND IMAGING | Facility: CLINIC | Age: 71
Discharge: HOME | End: 2021-04-14
Attending: FAMILY MEDICINE

## 2021-04-14 DIAGNOSIS — R59.1 LYMPHADENOPATHY: ICD-10-CM

## 2021-04-14 DIAGNOSIS — R92.8 OTHER ABNORMAL AND INCONCLUSIVE FINDINGS ON DIAGNOSTIC IMAGING OF BREAST: ICD-10-CM

## 2021-04-23 ENCOUNTER — COMMUNICATION - HEALTHEAST (OUTPATIENT)
Dept: FAMILY MEDICINE | Facility: CLINIC | Age: 71
End: 2021-04-23

## 2021-04-23 DIAGNOSIS — I10 ESSENTIAL HYPERTENSION: ICD-10-CM

## 2021-04-23 RX ORDER — LOSARTAN POTASSIUM 50 MG/1
75 TABLET ORAL DAILY
Qty: 90 TABLET | Refills: 2 | Status: SHIPPED | OUTPATIENT
Start: 2021-04-23 | End: 2021-10-12

## 2021-05-26 ENCOUNTER — RECORDS - HEALTHEAST (OUTPATIENT)
Dept: ADMINISTRATIVE | Facility: CLINIC | Age: 71
End: 2021-05-26

## 2021-05-27 ENCOUNTER — RECORDS - HEALTHEAST (OUTPATIENT)
Dept: ADMINISTRATIVE | Facility: CLINIC | Age: 71
End: 2021-05-27

## 2021-05-27 VITALS — DIASTOLIC BLOOD PRESSURE: 79 MMHG | SYSTOLIC BLOOD PRESSURE: 151 MMHG | HEART RATE: 74 BPM

## 2021-05-27 VITALS
TEMPERATURE: 97.7 F | RESPIRATION RATE: 16 BRPM | HEART RATE: 82 BPM | SYSTOLIC BLOOD PRESSURE: 152 MMHG | DIASTOLIC BLOOD PRESSURE: 71 MMHG

## 2021-05-27 VITALS — HEART RATE: 80 BPM | DIASTOLIC BLOOD PRESSURE: 80 MMHG | SYSTOLIC BLOOD PRESSURE: 122 MMHG

## 2021-05-28 ENCOUNTER — RECORDS - HEALTHEAST (OUTPATIENT)
Dept: ADMINISTRATIVE | Facility: CLINIC | Age: 71
End: 2021-05-28

## 2021-05-29 ENCOUNTER — RECORDS - HEALTHEAST (OUTPATIENT)
Dept: ADMINISTRATIVE | Facility: CLINIC | Age: 71
End: 2021-05-29

## 2021-05-30 ENCOUNTER — RECORDS - HEALTHEAST (OUTPATIENT)
Dept: ADMINISTRATIVE | Facility: CLINIC | Age: 71
End: 2021-05-30

## 2021-05-31 VITALS — WEIGHT: 152.4 LBS | HEIGHT: 63 IN | BODY MASS INDEX: 27 KG/M2

## 2021-05-31 VITALS — HEIGHT: 63 IN | WEIGHT: 150 LBS | BODY MASS INDEX: 26.58 KG/M2

## 2021-06-02 VITALS — WEIGHT: 156.4 LBS | BODY MASS INDEX: 27.71 KG/M2 | HEIGHT: 63 IN

## 2021-06-03 NOTE — TELEPHONE ENCOUNTER
RN cannot approve Refill Request    RN can NOT refill this medication Protocol failed and NO refill given.     Jessica Wilkinson, [facility] Triage/Med Refill 11/30/2019    Requested Prescriptions   Pending Prescriptions Disp Refills     estradiol (ESTRACE) 0.5 MG tablet [Pharmacy Med Name: ESTRADIOL 0.5MG     TAB] 90 tablet 4     Sig: TAKE 1 TABLET BY MOUTH ONCE DAILY       Hormone Replacement Therapy Refill Protocol Failed - 11/29/2019  4:57 PM        Failed - PCP or prescribing provider visit in past 12 months       Last office visit with prescriber/PCP: Visit date not found OR same dept: Visit date not found OR same specialty: 1/9/2018 Melanie Lim MD  Last physical: 11/15/2018 Last MTM visit: Visit date not found     Next visit within 3 mo: Visit date not found  Next physical within 3 mo: Visit date not found  Prescriber OR PCP: Jenni Valentin DO  Last diagnosis associated with med order: There are no diagnoses linked to this encounter.   If protocol passes may refill for 3 months.

## 2021-06-04 VITALS
WEIGHT: 157 LBS | HEIGHT: 63 IN | RESPIRATION RATE: 16 BRPM | TEMPERATURE: 98.1 F | SYSTOLIC BLOOD PRESSURE: 146 MMHG | DIASTOLIC BLOOD PRESSURE: 94 MMHG | HEART RATE: 84 BPM | BODY MASS INDEX: 27.82 KG/M2

## 2021-06-04 VITALS — WEIGHT: 155 LBS | HEIGHT: 62 IN | BODY MASS INDEX: 28.52 KG/M2

## 2021-06-04 VITALS
WEIGHT: 157.2 LBS | TEMPERATURE: 98.1 F | HEART RATE: 88 BPM | OXYGEN SATURATION: 100 % | RESPIRATION RATE: 16 BRPM | HEIGHT: 63 IN | DIASTOLIC BLOOD PRESSURE: 81 MMHG | SYSTOLIC BLOOD PRESSURE: 150 MMHG | BODY MASS INDEX: 27.85 KG/M2

## 2021-06-04 VITALS
HEIGHT: 63 IN | RESPIRATION RATE: 16 BRPM | BODY MASS INDEX: 27.93 KG/M2 | DIASTOLIC BLOOD PRESSURE: 67 MMHG | HEART RATE: 82 BPM | TEMPERATURE: 97.3 F | WEIGHT: 157.6 LBS | OXYGEN SATURATION: 97 % | SYSTOLIC BLOOD PRESSURE: 130 MMHG

## 2021-06-04 NOTE — PROGRESS NOTES
Assessment and Plan:     Yennifer was seen today for annual wellness visit.    Encounter for Medicare annual wellness exam-reviewed with patient health maintenance today.  Should continue annual mammogram.  Will need colonoscopy by the summer 2021.  Discussed living will and she was provided with honoring choices forms.  She is up-to-date on immunizations.  Fasting labs obtained today    Encounter for hepatitis C screening test for low risk patient  -     Hepatitis C Antibody (Anti-HCV)    Lipid screening  -     Lipid Cascade FASTING      Squamous cell carcinoma in situ of skin of face-recent uncomplicated Mohs surgery.  Discussed continuous sunscreen.  Will be following up regularly with her dermatologist    Right foot pain-the area of her pain could be consistent with a bone spur.  She had tried conservative therapies for plantar fasciitis.  Insurance does not cover customized orthotics.  Reviewed stretches that she could try doing and we would have gotten an x-ray today but they were not available.  She could come back for a separate appointment for x-ray if she desires or could have done with podiatry  -     Ambulatory referral to Podiatry  -     XR Foot Right 3 or More VWS; Future    Rosacea-skin does appear more clear since she has been using the MetroGel twice daily but is not covered by insurance.  She wants to trial the clindamycin that she had picked up okay to do so    Postmenopausal status  -     DXA Bone Density Scan; Future    Essential hypertension-blood pressure is still mildly elevated on lisinopril 5 mg and we were going to go up on the dose to 10 mg but patient indicates that she has had mild throat clearing and cough that she thinks is related to the medication so we will switch her to losartan starting at 25 mg daily.  We may need to go up on the dose of her medication.  She will come back within a couple weeks for blood pressure check and will see me every 6 months  -     losartan (COZAAR) 25  MG tablet; Take 1 tablet (25 mg total) by mouth daily. For blood pressure      Postmenopausal hot flashes on estradiol which will not be formulary from 2020.  Discussed reattempt to wean as noted below since patient did not follow instructions exactly.  If she is not able to wean off of it we can trial gabapentin or other SNRI over-the-counter strategies including your black cohosh did not help    That questions    Patient instructions--continue with the metrogel once daily for now since looking good, can increase to twice daily if pimples or redness flaring.   - when you run out, its ok to not do anything but if you start see flare again, try using the clindamycin sparingly in the morning daily.   -referral placed for podiatry. If not  Helpful, could consider a physical therapy referral in case there is any stretching you can do.    - consider referral to hand orthopedics for cortisone injection for the wrist    -consider using naproxen (aleve) - 500mg twice daily .   -for your penicillin allergy you can consider doing penicillin allergy testing with allergy referral  to get that taken off your list as potential allergies.     -start vitamin E 400IU once daily for hot flashes.  - start by taking your estradiol 0.5mg -take 1/2 tablet daily for a couple weeks and if doing well then eliminate 1/2 tablet one day a week then eliminate another 1/2 tablet every 2 weeks.   -the alternative is over the counter black cohosh is also helpful (herbal supplement) follow package instructions.   Or if not helpful , we can try gabapentin nerve medication prescription that can be helpful for hot flashes.   -message me if over the counter and weaning instructions not going well.   -change your lisinopril to losartan 25mg once daily and come back for blood pressure check within a couple weeks. Then see me back in 6 months     The patient's current medical problems were reviewed.    I have had an Advance Directives discussion with the  patient.  The following health maintenance schedule was reviewed with the patient and provided in printed form in the after visit summary:   Health Maintenance   Topic Date Due     HEPATITIS C SCREENING  1950     DXA SCAN  12/29/2017     MEDICARE ANNUAL WELLNESS VISIT  11/15/2019     FALL RISK ASSESSMENT  11/15/2019     ZOSTER VACCINES (3 of 3) 09/19/2019     COLONOSCOPY  08/04/2020     MAMMOGRAM  11/21/2021     LIPID  11/15/2023     ADVANCE CARE PLANNING  11/15/2023     TD 18+ HE  11/17/2025     PNEUMOCOCCAL IMMUNIZATION 65+ LOW/MEDIUM RISK  Completed     INFLUENZA VACCINE RULE BASED  Completed        Subjective:   Chief Complaint: Yennifer Castro is an 68 y.o. female here for an Annual Wellness visit.     HPI:    There are no changes to her or her family's medical history. Her weight has been stable. She eats relatively healthy.     Skin Cancer: She saw the dermatologist, Dr. Gerber, for her actinic keratosis and they found a cancerous spot on her hairline. She cannot recall if it was squamous or basal cell, but she knows it was the better of the two. She will return to the dermatologist yearly. She will use sun screen regularly.     Rosacea: She has a history of rosacea. She did not try the clindamycin because she was afraid of the side effects. She did not ask the dermatologist about her rosacea. She decided to buy MetroGel 0.7% when she was in [place]. She has been using it since then. She believes that the rosacea has improved.     Right Foot Pain: She has not seen a podiatrist yet. She is still having pain along the inner side of her right foot. Whenever she gets pain in her foot, the pain will travel into the right knee and hip as well. She tried orthotics and a hard sole shoe which have not improved the pain. Insurance does not offer coverage for custom orthotics. The pain begins when she is busy and walking a lot especially on hard surfaces. The pain builds throughout the day. If she is doing her  daily walk for exercise she does not have the pain. She does not wake up with the pain. She has been taking tylenol at night on the days when she has pain. The pain is not present on the outside of the right foot or on the left foot at all. She has not tried any daily stretches.     Left Thumb Pain: She had an x-ray of the left hand on 11/15/18 which showed advanced degenerative changes are seen in the DIP joints of all but the thumb. She was sent home with a splint. She wore the splint intermittently throughout the day for about a month before she stopped wearing it. She would wear the splint to bed and would wake up in more pain. Her pain is most present in the left thumb. She has tried tylenol for other conditions and has not noticed if it improved the thumb pain or not. Whenever the weather changes, the pain in her left thumb is more prominent.     Allergic Reaction: She got penicillin after a dental procedure many years ago. On the last day of her penicillin dosage she developed a rash. She does not recall becoming short of breath. She has not had this reaction since then. She also had a reaction to sulfa. She developed mouth sores when she used sulfa. Both of these reactions occurred about 20 years ago. She would be interested in penicillin testing.     Hypertension: She has a history of elevated blood pressure both in the clinic and outside the clinic. She used to have low blood pressure, 90/60 mmHg, until about 6 years ago when she had a lot of high life stressors which triggered an increase. Since then she has not been able to lower her blood pressure. She mentions that she ate high sodium foods yesterday. She takes lisinopril 5 mg once a day. Today in clinic her blood pressure is 150/81 mmHg. She has noticed that she has had a persistent dry cough that she believes is from the lisinopril. She does not have access to a blood pressure apparatus at home. She denies any history of kidney problems.      Menopausal symptoms: She had a hysterectomy about 40 years ago. She has been taking estradiol 0.5 mg once a day. She tried to wean herself off of it, but she felt symptoms when she tried to reduce the amount. She never started a vitamin E supplement. Her insurance is now going to increase the cost of estradiol. She inquires about other options to prevent the hot flashes and menopausal symptoms.     Health Maintenance: She is fasting today. She had a benign mammogram in November. She inquires if she should be taking her 2,000 units of vitamin D once a day. Her last bone density scan was in 2014. She will be due for another colonoscopy in August 2020. She is on the 3 year plan for her colonoscopy.     Review of Systems:   Negative for: vaginal symptoms, diarrhea, abdominal pain or vomiting.   Please see above. The rest of the review of systems are negative for all systems.    Patient Care Team:  Jenni Valentin DO as PCP - General (Family Medicine)  Jenni Valentin DO as Assigned PCP     Patient Active Problem List   Diagnosis     Menopausal symptoms     Seasonal allergies     Colon polyps     Arthritis of foot, left     AK (actinic keratosis)     Hyperlipidemia     Rosacea     Bunion, left     Joint pain in fingers of left hand     Plantar fasciitis     Degenerative arthritis of thumb, left     Essential hypertension     Squamous cell carcinoma in situ of skin of face     Past Medical History:   Diagnosis Date     Colon polyp     2012 and 2017     Endometriosis       Past Surgical History:   Procedure Laterality Date     APPENDECTOMY       CYSTOSCOPY W/ URETERAL STENT PLACEMENT       TOTAL ABDOMINAL HYSTERECTOMY W/ BILATERAL SALPINGOOPHORECTOMY Bilateral 1985    endometriosis      Family History   Problem Relation Age of Onset     Breast cancer Mother 40        metastasized to intestines      Hypertension Father      Diabetes Father      Coronary artery disease Father         bypass surgery      Esophageal cancer Father      Heart attack Father      Breast cancer Paternal Aunt      Kidney disease Brother         congenital     Diabetes type II Brother      Stroke Brother      Coronary artery disease Brother         bypass      Breast cancer Paternal Aunt       Social History     Socioeconomic History     Marital status:      Spouse name: Not on file     Number of children: Not on file     Years of education: Not on file     Highest education level: Not on file   Occupational History     Occupation: office    Social Needs     Financial resource strain: Not on file     Food insecurity:     Worry: Not on file     Inability: Not on file     Transportation needs:     Medical: Not on file     Non-medical: Not on file   Tobacco Use     Smoking status: Never Smoker     Smokeless tobacco: Never Used   Substance and Sexual Activity     Alcohol use: No     Drug use: No     Sexual activity: Yes     Partners: Male   Lifestyle     Physical activity:     Days per week: Not on file     Minutes per session: Not on file     Stress: Not on file   Relationships     Social connections:     Talks on phone: Not on file     Gets together: Not on file     Attends [employer] service: Not on file     Active member of club or organization: Not on file     Attends meetings of clubs or organizations: Not on file     Relationship status: Not on file     Intimate partner violence:     Fear of current or ex partner: Not on file     Emotionally abused: Not on file     Physically abused: Not on file     Forced sexual activity: Not on file   Other Topics Concern     Not on file   Social History Narrative     Not on file      Current Outpatient Medications   Medication Sig Dispense Refill     cholecalciferol, vitamin D3, (VITAMIN D3) 2,000 unit cap Take 1 capsule by mouth daily.       estradiol (ESTRACE) 0.5 MG tablet TAKE 1 TABLET BY MOUTH ONCE DAILY 90 tablet 4     MULTIVITAMIN WITH MINERALS/LUT (MULTIVITAMIN 50 PLUS ORAL) Take 1  "tablet by mouth daily.       losartan (COZAAR) 25 MG tablet Take 1 tablet (25 mg total) by mouth daily. For blood pressure 90 tablet 1     No current facility-administered medications for this visit.       Objective:   Vital Signs:   Visit Vitals  /81   Pulse 88   Temp 98.1  F (36.7  C) (Oral)   Resp 16   Ht 5' 2.5\" (1.588 m)   Wt 157 lb 3.2 oz (71.3 kg)   SpO2 100%   BMI 28.29 kg/m         VisionScreening:  No exam data present     PHYSICAL EXAM  Blood pressure re-check by provider: 150/81 mmHg sitting, left arm, adult regular cuff  GENERAL:  Patient alert, in no acute distress.  EYES: PERRLA. Extraoccular movements intact, pupils equal, reactive to light and accommodation.  Normal conjunctiva and lids.    ENT:  Hearing grossly normal.  Normal appearance to ears and nose.  Bilateral TM s, external canals, oropharynx normal. Normal lips, gums and teeth.  Normal nasal mucosa, septum and turbinates.  NECK:  Supple, without thyromegaly or mass.  RESP:  Clear to auscultation without crackles, wheezes or distress.  Normal respiratory effort.   CV:  Regular rate and rhythm without murmurs, rubs or gallops.  Normal carotid, abdominal aorta, femoral and pedal pulses.  No varicosities or edema.  ABDOMEN:  Soft, non-tender, without hepatosplenomegaly, masses, or hernias.   BREASTS:  Nontender, without masses, nipple discharge, erythema, or axillary adenopathy.    LYMPHATIC: Normal palpation of neck, groin and axilla..  No lymphadenopathy.  No bruising.  NEURO:  CN II-XII intact, motor & sensory function all intact.  DTR and reflexes normal.  PSYCHIATRIC:  Alert & oriented with normal mood and affect.  Good judgment and insight.  SKIN:  Normal inspection and palpation. Multiple seborrheic keratoses on her legs bilaterally and back.   MUSCULOSKELETAL: Normal gait and station. Negative finkelstein test to the left wrist.  Pain on left CMC.  Pain along right middle calcaneus along the medial aspect  - Spine / Ribs / Pelvis: " Normal inspection, ROM, stability and strength: Spine, Head, Neck, Upper and Lower Extremities.    Assessment Results 12/27/2019   Activities of Daily Living No help needed   Instrumental Activities of Daily Living No help needed   Mini Cog Total Score 5   Some recent data might be hidden     A Mini-Cog score of 0-2 suggests the possibility of dementia, score of 3-5 suggests no dementia    Identified Health Risks:     The patient was counseled and encouraged to consider modifying their diet and eating habits. She was provided with information on recommended healthy diet options.  Information regarding advance directives (living barraza), including where she can download the appropriate form, was provided to the patient via the AVS.     ADDITIONAL HISTORY SUMMARIZED (2): Reviewed note from 11/07/14 showing high blood pressure.   DECISION TO OBTAIN EXTRA INFORMATION (1): None.   RADIOLOGY TESTS (1): Reviewed mammogram from 11/21/19 that was benign. Ordered DXA scan today. Ordered XR Foot Right today.   LABS (1): Reviewed labs from 11/12/18 and ordered labs today.   MEDICINE TESTS (1): None.  INDEPENDENT REVIEW (2 each): None.     The visit lasted a total of 53 minutes face to face with the patient. Over 50% of the time was spent counseling and educating the patient about hypertension, right foot pain, rosacea, squamous cell carcinoma and overall wellness.    IPaula, am scribing for and in the presence of, Dr. Valentin.    IDr. Valentin, personally performed the services described in this documentation, as scribed by Paula White in my presence, and it is both accurate and complete.    Total data points: 4

## 2021-06-05 VITALS
OXYGEN SATURATION: 97 % | DIASTOLIC BLOOD PRESSURE: 119 MMHG | RESPIRATION RATE: 12 BRPM | SYSTOLIC BLOOD PRESSURE: 185 MMHG | TEMPERATURE: 98.1 F | HEART RATE: 85 BPM

## 2021-06-05 VITALS
HEIGHT: 63 IN | SYSTOLIC BLOOD PRESSURE: 159 MMHG | WEIGHT: 164 LBS | BODY MASS INDEX: 29.06 KG/M2 | TEMPERATURE: 97.4 F | DIASTOLIC BLOOD PRESSURE: 82 MMHG | HEART RATE: 82 BPM

## 2021-06-05 VITALS
WEIGHT: 159 LBS | DIASTOLIC BLOOD PRESSURE: 76 MMHG | BODY MASS INDEX: 28.17 KG/M2 | SYSTOLIC BLOOD PRESSURE: 138 MMHG | RESPIRATION RATE: 14 BRPM | OXYGEN SATURATION: 97 % | HEIGHT: 63 IN | TEMPERATURE: 98 F | HEART RATE: 85 BPM

## 2021-06-05 VITALS — HEIGHT: 63 IN | OXYGEN SATURATION: 99 % | WEIGHT: 159 LBS | HEART RATE: 83 BPM | BODY MASS INDEX: 28.17 KG/M2

## 2021-06-05 NOTE — TELEPHONE ENCOUNTER
I will need to confirm with [facility] tomorrow to see if there is still an OR available for 01/27/20. I left a message for patient informing her I think we can do it but I need to confirm with [facility]. I also advised that she will need to get an H&P schedule with her PCP this week.

## 2021-06-05 NOTE — TELEPHONE ENCOUNTER
Patient sent a my chart message to let us know [place] and will call our office on Feb.10th to schedule this appt.

## 2021-06-05 NOTE — PATIENT INSTRUCTIONS - HE
Surgery Information    Surgery Date Yasmine will call you to schedule for surgery    ( Arrive at the hospital one and a half hours prior to your surgery and 1 hour prior at the surgery center. Check in at the . The only exception is if you are scheduled for surgery at 7am, you should arrive at 5:30am) The nurse will call you a couple of day before surgery go with the time the nurse tells you.  All surgery times are estimated please go with what the nurse tells you when she calls.  Emergency's do happen and surgery times do get changed the nurse will let you know.  We give you the best estimate of time that we can at the time.      **ALL [facility] PAPERWORK IS TO BE FAXED -[phone], IT WILL BE PROCESSED AFTER SURGERY IS COMPLETE.    IF YOU NEED TO RESCHEDULE OR CANCEL YOUR SURGERY FOR ANY REASON PLEASE CALL THE CLINIC AS SOON AS POSSIBLE -[phone].    Admission Type: Outpatient    Surgeon: Dr. Humphrey    Surgery Procedure: EXCISION, BONE SPUR, FOOT, WITH PLANTAR FASCIA RELEASE RIGHT     Surgery Location: [facility],[address] [place] [address], FAX [phone]    Additional Information: If you use a CPAP machine for sleep apnea please bring it with you for surgery. We will want to monitor your breathing using your normal equipment.     HOSPITAL AND SURGERY CENTER INFORMATION    We need to know a lot of information about you before surgery.     1-5 Days Before:     A nurse will call you for a pre-screening interview. The phone call with the nurse will be much faster and easier if you  Have organized your information prior to the call.   This is when you will be told when to show up and what to bring.    Please have the following information available:    Preoperative Exam completed and faxed to our office  has to be within 30 days will not except 31 days.    Primary care provider's and any specialty providers' contact information available. .    If requested by your primary care  provider, have any heart and lung exams at least 3 days before surgery.    Have a complete and accurate list of medications available.     Have a list of your allergies/sensitivities and reactions    Know your health history, surgical history, and medical problems    Know any anesthesia issues with you or within your family.     BE SURE TO NOTIFY US IF YOU ARE TAKING ANY BLOOD THINNING AGENTS: Coumadin, Plavix, Aspirin, Xarelto, Eliquis    Someone plan to bring you and stay with you after the surgery for 24 hours    Day Before Surgery    1. STOP Smoking and Drinking: It is important to stop smoking and drinking at least 24 hours before surgery.  Smoking and drinking may cause complications in your recovery from anesthesia and may lengthen the healing process.    Please bring with you the day of surgery      List of medication    Eyeglass case or contact case with solution. You cannot wear contacts during surgery    Copy of Health Care Directives, Living Will or Power of     Insurance Cards    Photo ID    3. NOTHING BY MOUTH 8 HOURS BEFORE. This includes gum, hard candy, water and mints. The only exception is if you have been instructed by your doctor to take your medications with sips of water. You may rinse your mouth or brush your teeth, but do not swallow water.     4. Remove Nail Polish on fingers as well as toes  Day of Surgery    1. Medications- Take as indicated with sips of water     2. Wear comfortable loose fitting clothes. Wear your glasses-Not contacts. Do not wear jewelry and remove body piercing's. Surgery may be cancelled if they are not removed.     3. If same day surgery-Have a someone come with you to surgery that can help you understand the surgeon's instructions, drive you home and stay with you overnight the first night.     4. A nurse will call you at home within 72 hours following surgery to see how you are doing. Our nurses and doctors will discuss recovery with you.   POST-OPERATIVE  "CARE INSTRUCTIONS    After surgery  You will have swelling in the foot, and pain from the incision. The swelling is related to the increased blood flow to the foot in response to the \"surgical injury\" as well as the decreased capacity of blood to return to the heart due to less lower leg muscle contractions (which have the effect of increasing venous blood return). Swelling is often directly proportional to the pain. The greatest swelling occurs in the first 3-4 days after surgery . After that the swelling gradually diminishes. However, it often takes many months (or even a year for major surgery) for all of the swelling to resolve. The pain associated with swelling can vary widely. If it is not managed appropriately it can be very uncomfortable and frustrating.      1. Following surgery, go directly home and elevate your foot. Elevate your foot about 6 inches about the level of you hip by placing pillows under your foot and leg. Do not sit with your foot down, dangling or with your feet crossed.      2. Rest as often as possible with your foot elevated to reduce the occurrence of swelling and associated pain. Reserve walking for using the restroom and getting food/beverages.     3. Place ice over foot/ ankle area or behind the knee (if casted) for 20 minutes of each hour for 24 hours. Ice should NEVER be used when the foot is numb from a nerve block as this can easily lead to frostbite.     4. Use pain medications as prescribed with food. Once home, take pain medication and do the same at supper time and bedtime. The pain medication and ice should help to \"control\" your pain, however, it may NOT take away all of the pain.      Take or ask for pain relief medication when pain begins. It is easier to prevent or relieve pain before it becomes too bad.     Some activities may make pain worse. Take your pain medicine first.     Your doctor and nurse use a 0-10 pain rating scale to report your pain. 0= no pain and 10 = " the worst possible pain. Reporting a number helps us to understand how well your pain control plan is working and if your pain control plan needs changes.      5. Keep your bandages clean and dry while the incision heals. A small amount of bleeding is normal. DO NOT CHANGE THE DRESSING! If your bandages become damp call our office immediately. Options to keep this area dry when bathing include:      SPONGE BATHS    PLASTIC BAG WITH TAPE OR CAST BAG     SHOWER CHAIR OR HANDHELD SHOWER HEAD    DANGLING YOUR LEG/FOOT OVER THE EDGE OF THE TUB     6. Exercise your legs frequently by bending your knees to stimulate circulation and help aid in healing.     7. If given a post operative shoe or cast boot wear at all times when walking. You may remove the post-operative shoe or cast boot at bedtime. If the cast boot becomes too heavy or painful it may be removed only while the foot is elevated and not bearing weight.      8. DO NOT operate heavy equipment, drive a vehicle for 24 hours after surgery and while taking pain medications. You may resume driving when you feel you can do so safely.     CALL IMMEDIATELY IF:    THE BANDAGES ARE SOAKED FROM BLOOD, DRAINAGE OR WATER    YOUR MEDICATION DOES NOT MANAGE THE DISCOMFORT    YOU INJURE YOUR FOOT    YOU DEVELOP A FEVER    THE BANDAGES BECOME TOO TIGHT OR YOUR TOES BECOME NUMB (after 24 hours you may cut your bandage 1 inch from the toe area and ankle area this will relieve pain)    PLEASE CALL THE [facility] PODIATRY LINE -[phone] WITH ANY QUESTION OR CONCERNS. IF CALLING AFTER REGULAR BUSINESS HOURS THE PHYSICIAN ON CALL WILL BE PAGED TO RETURN YOUR CALL.    The doctor will need to see you for 1-3 post operative appointments depending on your surgery and recovery. Please ensure these are scheduled before your surgery or immediately after surgery.

## 2021-06-05 NOTE — PROGRESS NOTES
I do not know that the [place] was causing any high blood pressure readings.  I would advise her to go up to losartan 50 mg daily and repeat blood pressure check in 1 to 2 weeks.  She can double up on what she has but in the meantime I will call in a 50 mg tablet if that is easier

## 2021-06-05 NOTE — TELEPHONE ENCOUNTER
Dr Valentin,    Are you to work this patient in for a pre op the week of Feb 10th or should we schedule with a partner here?    Thank you!

## 2021-06-05 NOTE — TELEPHONE ENCOUNTER
They are still waiting to get a surgery date for her  first. She will call back when she is ready to schedule.

## 2021-06-05 NOTE — TELEPHONE ENCOUNTER
Second Attempt: LM for patient to call back to schedule her Pre Op appt with Dr Valentin. See below message from Dr Valentin on when she can work patient in.   Spoke with patient regarding results, showed understanding.

## 2021-06-05 NOTE — TELEPHONE ENCOUNTER
Misunderstanding of the message. She wants a call back on Monday to schedule for a future date. She has been removed from the surgery schedule for 01/27/20.    I will follow up with her on Monday.

## 2021-06-05 NOTE — TELEPHONE ENCOUNTER
----- Message from Jenni Valentin DO sent at 1/20/2020  2:57 PM CST -----    I do not know that the [place] was causing any high blood pressure readings.  I would advise her to go up to losartan 50 mg daily and repeat blood pressure check in 1 to 2 weeks.  She can double up on what she has but in the meantime I will call in a 50 mg tablet if that is easier  ----- Message -----  From: Christine Tamayo CMA  Sent: 1/16/2020  10:18 AM CST  To: Jenni Valentin DO

## 2021-06-05 NOTE — PROGRESS NOTES
Surgery/Procedure: Excision, Bone Spur with Plantar Fascia Release, right foot    Special Equipment: TBD    Location: [facility]    Date: 02/17/2020    Time: 7:00am    Surgeon: Dr. Humphrey    Assist: No    Length of Surgery: 60 minutes    OR Confirmed/ :  Asad Tovar on 02/03/20    Orders In:  Yes    Provider Team Notified:  Yes    Entered on [employer] Calendar:  Yes    Post Op: 02/26 and 03/04/20 @ [facility]

## 2021-06-05 NOTE — PROGRESS NOTES
Follow Up Blood Pressure Check    Yennifer Castro is a 69 y.o. female recommended to follow up for blood pressure check by Jenni Valentin DO. Anihypertensive medications and adherence were verified: Yes.     Reason for visit: Uncontrolled blood pressure at last visit and dry cough.  Med changed.    Essential hypertension-blood pressure is still mildly elevated on lisinopril 5 mg and we were going to go up on the dose to 10 mg but patient indicates that she has had mild throat clearing and cough that she thinks is related to the medication so we will switch her to losartan starting at 25 mg daily.  We may need to go up on the dose of her medication.  She will come back within a couple weeks for blood pressure check and will see me every 6 months  -     losartan (COZAAR) 25 MG tablet; Take 1 tablet (25 mg total) by mouth daily. For blood pressure       Patient has been taking a cold medicine for about a week.  ([place]ancy[place] and tylenol) She stopped it yesterday as she was concerned it could increase her blood pressure.     Medication change at last visit: Lisinopril 5 mg to Losartan 25 mg.  Patient tolerating new med.     Today's Vitals:   Vitals:    01/16/20 0959 01/16/20 1008 01/16/20 1013   BP: 154/72 162/76 151/79   Pulse: 78 79 74       Home blood pressure readings brought in today:   none    Lowest blood pressure today is <180/<110 and they deny signs or symptoms of new onset.  Christine Tamayo    Current Outpatient Medications   Medication Sig Dispense Refill     cholecalciferol, vitamin D3, (VITAMIN D3) 2,000 unit cap Take 1 capsule by mouth daily.       estradiol (ESTRACE) 0.5 MG tablet TAKE 1 TABLET BY MOUTH ONCE DAILY 90 tablet 4     losartan (COZAAR) 25 MG tablet Take 1 tablet (25 mg total) by mouth daily. For blood pressure 90 tablet 1     MULTIVITAMIN WITH MINERALS/LUT (MULTIVITAMIN 50 PLUS ORAL) Take 1 tablet by mouth daily.       No current facility-administered medications for this visit.

## 2021-06-05 NOTE — TELEPHONE ENCOUNTER
She is scheduled for surgery on 01/27/20. I left a message for the patient to call me back to confirm she is able to get an H&P this week and to talk about surgery and schedule post ops.

## 2021-06-05 NOTE — TELEPHONE ENCOUNTER
Left message to call back for: Blood pressure follow up  Information to relay to patient:  See message below.  Please relay information.

## 2021-06-05 NOTE — TELEPHONE ENCOUNTER
New Appointment Needed  What is the reason for the visit:    Bone spur surgery on 2/17 with Dr. Los Humphrey at  Podiatry @[facility]   Provider Preference: Valentin  How soon do you need to be seen?:Before 2/17 will be out of town the week of 2/3 can schedule week of 2/10  Waitlist offered?:No  Okay to leave a detailed message:  Yes

## 2021-06-05 NOTE — PROGRESS NOTES
FOOT AND ANKLE SURGERY/PODIATRY CONSULT NOTE         ASSESSMENT:   Plantar calcaneal spur right foot      TREATMENT:  I have recommended an patient of the plantar calcaneal spur of the right foot.         HPI: I was asked to see Yennifer Castro today to evaluate and treat chronic right heel pain.  The patient stated that she has had severe heel pain involving her right foot for 2 years.  She has tried stretching exercises with very little relief.  She takes Tylenol which gives her some moderate relief.  She denies any trauma to the right foot.  The pain is aggravated with weightbearing and ambulation.  She stated that her pain is even noticeable while resting.  She stated that after a full day of activities her heel can throb while resting.  She has not had any associated redness or swelling.  The pain is relieved only after prolonged nonweightbearing.  The patient was seen in consultation at the request of Jenni Valentin DO for evaluation and treatment of severe right heel pain.     Past Medical History:   Diagnosis Date     Colon polyp     2012 and 2017     Endometriosis        Past Surgical History:   Procedure Laterality Date     APPENDECTOMY       CYSTOSCOPY W/ URETERAL STENT PLACEMENT       TOTAL ABDOMINAL HYSTERECTOMY W/ BILATERAL SALPINGOOPHORECTOMY Bilateral 1985    endometriosis       Allergies   Allergen Reactions     Other Environmental Allergy      Penicillins      rash     Sulfa (Sulfonamide Antibiotics)      Mouth sores          Current Outpatient Medications:      cholecalciferol, vitamin D3, (VITAMIN D3) 2,000 unit cap, Take 1 capsule by mouth daily., Disp: , Rfl:      estradiol (ESTRACE) 0.5 MG tablet, TAKE 1 TABLET BY MOUTH ONCE DAILY, Disp: 90 tablet, Rfl: 4     losartan (COZAAR) 50 MG tablet, Take 1 tablet (50 mg total) by mouth daily. For blood pressure (Patient taking differently: Take 50 mg by mouth daily. ), Disp: 90 tablet, Rfl: 1     MULTIVITAMIN WITH MINERALS/LUT (MULTIVITAMIN 50  PLUS ORAL), Take 1 tablet by mouth daily., Disp: , Rfl:     Family History   Problem Relation Age of Onset     Breast cancer Mother 40        metastasized to intestines      Hypertension Father      Diabetes Father      Coronary artery disease Father         bypass surgery     Esophageal cancer Father      Heart attack Father      Breast cancer Paternal Aunt      Kidney disease Brother         congenital     Diabetes type II Brother      Stroke Brother      Coronary artery disease Brother         bypass      Breast cancer Paternal Aunt        Social History     Socioeconomic History     Marital status:      Spouse name: Not on file     Number of children: Not on file     Years of education: Not on file     Highest education level: Not on file   Occupational History     Occupation: office    Social Needs     Financial resource strain: Not on file     Food insecurity:     Worry: Not on file     Inability: Not on file     Transportation needs:     Medical: Not on file     Non-medical: Not on file   Tobacco Use     Smoking status: Never Smoker     Smokeless tobacco: Never Used   Substance and Sexual Activity     Alcohol use: No     Drug use: No     Sexual activity: Yes     Partners: Male   Lifestyle     Physical activity:     Days per week: Not on file     Minutes per session: Not on file     Stress: Not on file   Relationships     Social connections:     Talks on phone: Not on file     Gets together: Not on file     Attends Yazdanism service: Not on file     Active member of club or organization: Not on file     Attends meetings of clubs or organizations: Not on file     Relationship status: Not on file     Intimate partner violence:     Fear of current or ex partner: Not on file     Emotionally abused: Not on file     Physically abused: Not on file     Forced sexual activity: Not on file   Other Topics Concern     Not on file   Social History Narrative     Not on file       Review of Systems - Patient denies  fever, chills, rash, wound, stiffness, limping, numbness, weakness, heart burn, blood in stool, chest pain with activity, calf pain when walking, shortness of breath with activity, chronic cough, easy bleeding/bruising, swelling of ankles, excessive thirst, fatigue, depression, anxiety.  Patient admits to right heel pain.      OBJECTIVE:  Appearance: alert, well appearing, and in no distress.    Vitals:    01/21/20 1032   BP: (!) 146/94   Pulse: 84   Resp: 16   Temp: 98.1  F (36.7  C)       BMI= Body mass index is 28.26 kg/m .    General appearance: Patient is alert and fully cooperative with history & exam.  No sign of distress is noted during the visit.  Psychiatric: Affect is pleasant & appropriate.  Patient appears motivated to improve health.  Respiratory: Breathing is regular & unlabored while sitting.  HEENT: Hearing is intact to spoken word.  Speech is clear.  No gross evidence of visual impairment that would impact ambulation.    Vascular: Dorsalis pedis and posterior tibial pulses are palpable. There is no pedal hair growth bilaterally.  CFT < 3 sec from anterior tibial surface to distal digits bilaterally. There is no appreciable edema noted.  Dermatologic: Turgor and texture are within normal limits. No coloration or temperature changes. No primary or secondary lesions noted.  Neurologic: All epicritic and proprioceptive sensations are grossly intact bilaterally.  Negative Tinel sign right tarsal tunnel  Musculoskeletal: All active and passive ankle, subtalar, midtarsal, and 1st MPJ range of motion are grossly intact without pain or crepitus, with the exception of none. Manual muscle strength is within normal limits bilaterally. All dorsiflexors, plantarflexors, invertors, evertors are intact bilaterally. Tenderness present to plantar medial aspect of the right heel on palpation.  No tenderness to right foot or ankle with range of motion. Calf is soft/non-tender without warmth/induration    Imaging:      No results found.       TREATMENT:  An x-ray of the right foot was taken today.  I informed the patient she does have a large heel spur which is the source of her pain.  I have recommended excision of the plantar calcaneal spur the right foot in an attempt to successfully treat the patient's chronic right heel pain.  The patient was told the procedure would be done on an outpatient basis under local anesthesia with IV sedation.  She was told the procedure will take approximately 10 to 15 minutes to perform.  She would then be discharged able to weight-bear in a postoperative shoe for 2 weeks.  The patient was asked to go n.p.o. at midnight prior to the procedure and she was asked to see her primary care physician for preoperative consultation.  All pertinent questions were answered.    Los Humphrey; CON  [facility] Foot [facility] Ankle Surgery/Podiatry

## 2021-06-05 NOTE — TELEPHONE ENCOUNTER
Can use appointment time next Tuesday at 11 AM or 1120.  Likely only need 20 minutes with patient.  Can use any reserved spot otherwise next week or could even may be see at the end of the day on Friday this week if need be

## 2021-06-05 NOTE — TELEPHONE ENCOUNTER
Patient Returning Call  Reason for call:  lmtcb   Information relayed to patient:  Relayed msg and patient agrees, Patient will follow up as advised . No further questions at this time .   Thank You   Patient has additional questions:  No  If YES, what are your questions/concerns:  Na  Okay to leave a detailed message?: No call back needed

## 2021-06-06 NOTE — PATIENT INSTRUCTIONS - HE
- Do not take ibuprofen until after surgery. Do not take the vitamin E until after surgery. Stop taking these medications now, and do not start them until after surgery.   - You can take the losartan at night as you normally do.   -follow up bone scan when able  [phone]   -schedule colonoscopy this summer  - Use a stool softener as needed if you develop constipation from narcotic pain controllers.

## 2021-06-06 NOTE — PROGRESS NOTES
Preoperative Exam    Scheduled Procedure: Bone spur removal from heel of right foot.   Surgery Date:  02/17/2019  Surgery Location: [facility], fax [phone]    Surgeon:  Dr. Humphrey    Assessment/Plan:     1. Pre-op exam   -Patient is cleared to undergo surgery for calcaneal spur removal with Dr. Humphrey on 2/17/2020.  No cardiac concern or underlying respiratory concern.  Instructions were given per below.  Please note that she has had anesthesia reactions with surgery that include coming out of anesthesia slowly and potentially being emotional or combative per patient report.  Please have anesthesiologist take this into account.  Has had successful colonoscopies with propofol  - Electrocardiogram Perform and Read  - Basic Metabolic Panel  - HM2(CBC w/o Differential)    2. Calcaneal spur, right  See above     3. Menopausal symptoms  -Has been working on weaning her estradiol to 0.25 mg.  Still having some symptoms.  If she has to go back to the 0.5 mg I am okay with that otherwise she is taking the vitamin E which she will hold prior to surgery.  Blood pressure well controlled at this time on losartan 50 mg daily.    4. Essential hypertension  -Continue current regimen at this time.  She takes in the evening so we did not discuss holding it the night before surgery        Surgical Procedure Risk: Low (reported cardiac risk generally < 1%)  Have you had prior anesthesia?: Yes  Have you or any family members had a previous anesthesia reaction:  Yes:   Do you or any family members have a history of a clotting or bleeding disorder?: No  Cardiac Risk Assessment: no increased risk for major cardiac complications    APPROVAL GIVEN to proceed with proposed procedure, without further diagnostic evaluation    Please Note: Sensitivity to anesthesia as indicated above    Functional Status: Independent  Patient plans to recover at home with family.     Subjective:      Yennifer Castro is a 69 y.o. female who  presents for a preoperative consultation.  The exam is requested by Dr. Humphrey in preparation for bone spur removal from the right foot on the heel to be performed at [facility] on 2/17/2019. Today s examination on 2/14/2020 is done to review the underlying surgical condition of bone spur, clear for anesthesia, and review medical problems with appropriate changes in medications. She does not know what the recovery is going to be like, she is a little nervous. She does not think she needs any medications to keep her calm.     She had an anesthesia reaction where she took a long time to wake up, and woke up hysterical and combative. She never vomits after anesthesia. She has had these reactions her entire life, even when she got her wisdom teeth out as a teenager. Two colonoscopies ago she had to stop the procedure because she was hysterical. It took her hours to come out of the procedure, she had to go back in a month to repeat the colonoscopy. During the repeat colonoscopy, she had propofol and tolerated it well. All her previous surgeries were in the 1980s or 1970s. She does not remember what was used during these surgeries.     Before her hysterectomy she had multiple surgeries to try to preserve her uterus. She had ovaries removed and tubes removed, and during one of these procedures she had a lot of blood loss. She had to have a blood transfusion during this procedure. She does not recall problems with the blood transfusion.     Dizziness: She gets dizzy when she puts her head down on the left side when going to bed. It is a brief moment of dizziness. She wanted to make sure that it was okay. She does not feel like it is a room spinning dizziness. She has a lot of allergies and sinus problems, so she thinks it might be related to this. She has never had an episode of syncope.     Hypertension: She takes losartan at night. Her blood pressure is well managed today.     Hot flashes: She still feels  hot and sweaty, but it is better than when she wasn't taking the estradiol. She takes 0.25 mg daily. She forgot to take the estradiol once, and a few days later she could feel a difference. She has felt a little more anxious since dropping to 0.25 mg from 0.5 mg per day.      Mohs surgery: She had Mohs surgery a year ago, and still has pain on her head. It happens infrequently. It feels like a sudden and sharp pain when it does occur. It will reoccur a few times before it passes. She has a follow up in about 4 months.     Health maintenance: She had a colonoscopy about 3 years ago. She has been waiting until 5 months from now to schedule her next colonoscopy. Her last DXA was in 2014.     She takes a multivitamin, vitamin D, and vitamin E. She is unsure if she needs all of these, and wanted to check with Dr. Valentin.     ROS: She denies any recent illnesses, pain to palpation of the abdomen, swelling in the legs, sleep apnea, shortness of breath, headaches, or chest pain. All other systems reviewed and are negative, other than those listed in the HPI.    PFSH: She is not working, so she does not have to take time off. She recently went to [place]. She wore sunscreen while she was there. She was able to walk and enjoy her vacation.     They do not keep aspirin in the house because her  donated his kidney. She has never had problems with narcotic pain medications.    Pertinent History  Do you have difficulty breathing or chest pain after walking up a flight of stairs: No  History of obstructive sleep apnea: No  Steroid use in the last 6 months: No  Frequent Aspirin/NSAID use: Yes:  She uses ibuprofen occasionally. Patient was counseled to hold ibuprofen until after surgery. She uses tylenol more regularly. She does not use aspirin.   Prior Blood Transfusion: Yes:  She had a blood transfusion because of major blood loss during a procedure before her hysterectomy. The procedure was removing parts of her uterus in  order to try to prevent doing a hysterectomy.   Prior Blood Transfusion Reaction: She tolerated the blood transfusion.   If for some reason prior to, during or after the procedure, if it is medically indicated, would you be willing to have a blood transfusion?:  There is no transfusion refusal.    Current Outpatient Medications   Medication Sig Dispense Refill     cholecalciferol, vitamin D3, (VITAMIN D3) 2,000 unit cap Take 1 capsule by mouth daily.       estradiol (ESTRACE) 0.5 MG tablet TAKE 1 TABLET BY MOUTH ONCE DAILY (Patient taking differently: Take 0.25 mg by mouth daily. ) 90 tablet 4     losartan (COZAAR) 50 MG tablet Take 1 tablet (50 mg total) by mouth daily. For blood pressure (Patient taking differently: Take 50 mg by mouth daily. ) 90 tablet 1     MULTIVITAMIN WITH MINERALS/LUT (MULTIVITAMIN 50 PLUS ORAL) Take 1 tablet by mouth daily.       vitamin E 400 unit capsule Take 400 Units by mouth daily.       No current facility-administered medications for this visit.         Allergies   Allergen Reactions     Other Environmental Allergy      Penicillins      rash     Sulfa (Sulfonamide Antibiotics)      Mouth sores        Patient Active Problem List   Diagnosis     Menopausal symptoms     Seasonal allergies     Colon polyps     Arthritis of foot, left     AK (actinic keratosis)     Hyperlipidemia     Rosacea     Bunion, left     Joint pain in fingers of left hand     Degenerative arthritis of thumb, left     Essential hypertension     Squamous cell carcinoma in situ of skin of face     Calcaneal spur, right       Past Medical History:   Diagnosis Date     Arthritis      Colon polyp     2012 and 2017     Endometriosis      History of transfusion      Hypertension        Past Surgical History:   Procedure Laterality Date     APPENDECTOMY       CYSTOSCOPY W/ URETERAL STENT PLACEMENT       HYSTERECTOMY       MOHS SURGERY  2019     TOTAL ABDOMINAL HYSTERECTOMY W/ BILATERAL SALPINGOOPHORECTOMY Bilateral 1985     "endometriosis       Social History     Socioeconomic History     Marital status:      Spouse name: Not on file     Number of children: Not on file     Years of education: Not on file     Highest education level: Not on file   Occupational History     Occupation: office    Social Needs     Financial resource strain: Not on file     Food insecurity:     Worry: Not on file     Inability: Not on file     Transportation needs:     Medical: Not on file     Non-medical: Not on file   Tobacco Use     Smoking status: Never Smoker     Smokeless tobacco: Never Used   Substance and Sexual Activity     Alcohol use: No     Drug use: No     Sexual activity: Yes     Partners: Male   Lifestyle     Physical activity:     Days per week: Not on file     Minutes per session: Not on file     Stress: Not on file   Relationships     Social connections:     Talks on phone: Not on file     Gets together: Not on file     Attends Yazidism service: Not on file     Active member of club or organization: Not on file     Attends meetings of clubs or organizations: Not on file     Relationship status: Not on file     Intimate partner violence:     Fear of current or ex partner: Not on file     Emotionally abused: Not on file     Physically abused: Not on file     Forced sexual activity: Not on file   Other Topics Concern     Not on file   Social History Narrative     Not on file       Patient Care Team:  Jenni Valentin DO as PCP - General (Family Medicine)  Jenni Valentin DO as Assigned PCP          Objective:     Vitals:    02/14/20 1404   BP: 130/67   Pulse: 82   Resp: 16   Temp: 97.3  F (36.3  C)   TempSrc: Oral   SpO2: 97%   Weight: 157 lb 9.6 oz (71.5 kg)   Height: 5' 2.5\" (1.588 m)         Physical Exam:  GENERAL:  Reveals an alert female in NAD.  Vitals:  Per nursing notes.  EYES: PERRLA. Extraocular movements intact. Normal conjunctiva and lids.   ENT:  Hearing grossly normal.  Normal appearance to ears and nose.  " Bilateral TM s, external canals, oropharynx normal. Normal lips, gums and teeth.  Normal nasal mucosa, septum and turbinates.  NECK:  Supple, without thyromegaly or mass.  LUNGS:  Clear to auscultation without crackles, wheezes or distress.  Normal respiratory effort.   CV:  Regular rate and rhythm without murmurs, rubs or gallops. No varicosities or edema. Carotids without bruits.   ABDOMEN:  Soft, non-tender, without organomegaly, masses, or hernias.   LYMPH: Normal palpation of neck, groin and axilla. No lymphadenopathy. No bruising.  NEURO:  CN II-XII intact.   PSYCH:  Alert & oriented with normal mood and affect.   SKIN:  Normal inspection and palpation.  MSK: Normal gait and station. Normal inspection, ROM, stability and strength: Spine, Head, Neck, Upper and Lower Extremities.       Patient Instructions   - Do not take ibuprofen until after surgery. Do not take the vitamin E until after surgery. Stop taking these medications now, and do not start them until after surgery.   - You can take the losartan at night as you normally do.   -follow up bone scan when able  [phone]   -schedule colonoscopy this summer  - Use a stool softener as needed if you develop constipation from narcotic pain controllers.       EKG:   EKG is normal ventricular rate of 69 normal sinus rhythm with no ST segment changes.  Awaiting for  final cardiology overread    Labs:  No labs were ordered during this visit    Immunization History   Administered Date(s) Administered     Influenza [id number], 12/31/2009     Influenza high dose,seasonal,PF, 65+ yrs 10/17/2018     Influenza, inj, historic,unspecified 10/15/2015, 10/03/2017, 10/14/2019     Influenza, seasonal,quad inj 6-35 mos 09/30/2011, 11/07/2014     Influenza,seasonal,quad inj =/> 6months 11/14/2016     Influenza,trivalent,im, 65+yrs 10/14/2019     Pneumo Conj 13-V (2010&after) 01/09/2018     Pneumo Polysac 23-V 11/15/2018     Td, Adult, Absorbed 1952     Tdap 11/17/2015      ZOSTER, LIVE 11/13/2014     ZOSTER, RECOMBINANT, IM 05/02/2019, 07/16/2019         ADDITIONAL HISTORY SUMMARIZED (2): None.  DECISION TO OBTAIN EXTRA INFORMATION (1): None.   RADIOLOGY TESTS (1): Reviewed DXA from 11/17/2014. DXA ordered.   LABS (1):  Labs ordered today and will be available prior to surgery. No previous concerns .  MEDICINE TESTS (1): Reviewed colonoscopy from 2017. ekg ordered  INDEPENDENT REVIEW (2 each): ekg interpreted      The visit lasted a total of 25 minutes face to face with the patient. Over 50% of the time was spent counseling and educating the patient about pre op physical.    Melvi MENDES, am scribing for and in the presence of, Dr. Valentin.    IDr. Valentin, personally performed the services described in this documentation, as scribed by Melvi Reed in my presence, and it is both accurate and complete.    Total data points:  5        Electronically signed by Jenni Valentin DO 02/14/20 1:50 PM

## 2021-06-06 NOTE — ANESTHESIA PREPROCEDURE EVALUATION
Anesthesia Evaluation      Patient summary reviewed   History of anesthetic complications (delayed emergence and emergence delirium)     Airway   Mallampati: II  Neck ROM: full   Pulmonary - negative ROS and normal exam    breath sounds clear to auscultation  (-) not a smoker                         Cardiovascular   Exercise tolerance: > or = 4 METS  (+) hypertension, , hypercholesterolemia,     (-) murmur  Rhythm: regular  Rate: normal,    no murmur      Neuro/Psych - negative ROS     Endo/Other - negative ROS   (+) arthritis,      GI/Hepatic/Renal - negative ROS           Dental - normal exam   (+) caps                       Anesthesia Plan  Planned anesthetic: MAC  Plan for propofol gtt w/ ketamine / fentanyl PRN for pain.  Discussed potential need to convert to GA w/ ETT vs LMA if not tolerating.  Decadron and zofran for PONV ppx.  Ketorolac 30 mg at EOC.  ASA 2     Anesthetic plan and risks discussed with: patient and spouse  Anesthesia plan special considerations: antiemetics,   Post-op plan: routine recovery

## 2021-06-06 NOTE — ANESTHESIA CARE TRANSFER NOTE
Last vitals:   Vitals:    02/17/20 0730   BP: 127/64   Pulse: 88   Resp: 14   Temp: 36.2  C (97.1  F)   SpO2: 99%     Patient's level of consciousness is awake  Spontaneous respirations: yes  Maintains airway independently: yes  Dentition unchanged: yes  Oropharynx: oropharynx clear of all foreign objects    QCDR Measures:  ASA# 20 - Surgical Safety Checklist: WHO surgical safety checklist completed prior to induction    PQRS# 430 - Adult PONV Prevention: 4558F - Pt received => 2 anti-emetic agents (different classes) preop & intraop  ASA# 8 - Peds PONV Prevention: NA - Not pediatric patient, not GA or 2 or more risk factors NOT present  PQRS# 424 - Marla-op Temp Management: 4559F - At least one body temp DOCUMENTED => 35.5C or 95.9F within required timeframe  PQRS# 426 - PACU Transfer Protocol: - Transfer of care checklist used  ASA# 14 - Acute Post-op Pain: ASA14B - Patient did NOT experience pain >= 7 out of 10

## 2021-06-06 NOTE — PROGRESS NOTES
Subjective findings: The patient return to the clinic today for postop visit #2, 2 weeks status post excision plantar calcaneal spur with plantar fasciotomy of the right foot.  The patient is in good spirits and she had no complaints.     Objective findings: The dressings were removed and wound margins are well coaptated and maintained.  There is no edema, erythema, cellulitis, drainage or bleeding noted.  Neurovascular status is intact.  Vital signs stable.     Assessment: Plantar calcaneal spur right foot     Plan: Sutures removed today.  A Band-Aid was placed across the incision site.  The patient was instructed to gradually return to normal activities.  She may bathe.  She may wear normal shoes.  She is to return to the clinic as needed.  She was told expect some mild to moderate discomfort for the next several weeks.

## 2021-06-06 NOTE — TELEPHONE ENCOUNTER
Looks like the 11am slot was given to another patient of yours for 40 minutes. Did you still want to offer 1120 to this patient or prefer a different day (reserved slot) or end of day Friday 2/14 instead?

## 2021-06-06 NOTE — TELEPHONE ENCOUNTER
I called Yennifer and let her know that Dr Humphrey said If there is no excessive active bleeding she does not need to come in to have the bandage  change.  Some drainage or bleeding is quite normal.  Dr Humphrey will see you on her next regularly scheduled appointment.

## 2021-06-06 NOTE — ANESTHESIA POSTPROCEDURE EVALUATION
Patient: Yennifer Castro  Procedure(s):  EXCISION, BONE SPUR, FOOT, WITH PLANTAR FASCIA RELEASE (Right)  Anesthesia type: MAC    Patient location: Phase II Recovery  Last vitals:   Vitals Value Taken Time   /75 2/17/2020  9:00 AM   Temp 36.2  C (97.1  F) 2/17/2020  7:30 AM   Pulse 81 2/17/2020  9:06 AM   Resp 16 2/17/2020  8:00 AM   SpO2 95 % 2/17/2020  9:06 AM   Vitals shown include unvalidated device data.  Post vital signs: stable  Level of consciousness: awake and responds to simple questions  Post-anesthesia pain: pain controlled  Post-anesthesia nausea and vomiting: no  Pulmonary: unassisted, return to baseline  Cardiovascular: stable and blood pressure at baseline  Hydration: adequate  Anesthetic events: no    QCDR Measures:  ASA# 11 - Marla-op Cardiac Arrest: ASA11B - Patient did NOT experience unanticipated cardiac arrest  ASA# 12 - Marla-op Mortality Rate: ASA12B - Patient did NOT die  ASA# 13 - PACU Re-Intubation Rate: ASA13B - Patient did NOT require a new airway mgmt  ASA# 10 - Composite Anes Safety: ASA10A - No serious adverse event    Additional Notes:  Endorses a dry cough post op, noted she had an OPA placed that likely is causing the irritation combined w/ high flow O2 and glycopyrrolate for secretion reduction. SpO2 97% on RA and lungs CTA w/ no wheezing or rales.

## 2021-06-06 NOTE — PROGRESS NOTES
Subjective findings: The patient return to the clinic today for postop visit #1, 1 week status post excision plantar calcaneal spur with plantar fasciotomy of the right foot.  The patient is in good spirits and she had no complaints.    Objective findings: The dressings were removed and wound margins are well coaptated and maintained.  There is no edema, erythema, cellulitis, drainage or bleeding noted.  Neurovascular status is intact.  Vital signs stable.    Assessment: Plantar calcaneal spur right foot    Plan: A sterile dressing was applied.  The patient is to return to the clinic in 1 week for postop visit #2 at which time the sutures will be removed.

## 2021-06-08 NOTE — TELEPHONE ENCOUNTER
Prior Authorization Request  Who s requesting:  Pharmacy  Pharmacy Name and Location: [id number]  Medication Name: estradiol (ESTRACE) 0.5 MG tablet  Insurance Plan: [employer]  Insurance Member ID Number:  #[id number]  CoverMyMeds Key: [id number]  Informed patient that prior authorizations can take up to 10 business days for response:   NA  Okay to leave a detailed message: Yes

## 2021-06-08 NOTE — TELEPHONE ENCOUNTER
Central PA team  [phone]  Pool: [facility] ([id number])          PA has been initiated.       PA form completed and faxed insurance via Cover My Meds     Key:  [id number] - PA Case ID: [id number] - Rx #: [id number]     Medication:  Estradiol 0.5MG tablets    Insurance:  [employer]         Response will be received via fax and may take up to 5-10 business days depending on plan

## 2021-06-10 NOTE — TELEPHONE ENCOUNTER
Refill Approved    Rx renewed per Medication Renewal Policy. Medication was last renewed on 1/20/20.    Jessica Wilkinson, [facility] Connection Triage/Med Refill 8/18/2020     Requested Prescriptions   Pending Prescriptions Disp Refills     losartan (COZAAR) 50 MG tablet [Pharmacy Med Name: Losartan Potassium 50 MG Oral Tablet] 90 tablet 0     Sig: Take 1 tablet by mouth once daily for blood pressure       Angiotensin Receptor Blocker Protocol Passed - 8/17/2020 10:29 AM        Passed - PCP or prescribing provider visit in past 12 months       Last office visit with prescriber/PCP: Visit date not found OR same dept: Visit date not found OR same specialty: Visit date not found  Last physical: 2/14/2020 Last MTM visit: Visit date not found   Next visit within 3 mo: Visit date not found  Next physical within 3 mo: Visit date not found  Prescriber OR PCP: Jenni Valentin DO  Last diagnosis associated with med order: 1. Essential hypertension  - losartan (COZAAR) 50 MG tablet [Pharmacy Med Name: Losartan Potassium 50 MG Oral Tablet]; Take 1 tablet by mouth once daily for blood pressure  Dispense: 90 tablet; Refill: 0    If protocol passes may refill for 12 months if within 3 months of last provider visit (or a total of 15 months).             Passed - Serum potassium within the past 12 months     Lab Results   Component Value Date    Potassium 3.9 02/14/2020             Passed - Blood pressure filed in past 12 months     BP Readings from Last 1 Encounters:   03/04/20 122/80             Passed - Serum creatinine within the past 12 months     Creatinine   Date Value Ref Range Status   02/14/2020 0.82 0.60 - 1.10 mg/dL Final

## 2021-06-12 NOTE — PROGRESS NOTES
Assessment and Plan:     Patient has been advised of split billing requirements and indicates understanding: Yes  1. Encounter for annual wellness exam in Medicare patient  Patient was seen for annual wellness visit.  The following issues were discussed below.  I reminded her to consider filling out advanced care directive which she thinks she has the paperwork for at home.  Immunizations updated.  - Comprehensive Metabolic Panel    2. Lipid screening  Reevaluate cholesterol.  Recommend continued exercise and Mediterranean-style diet  - Lipid Cascade FASTING    3. Degenerative arthritis of thumb, left  We discussed some of her degenerative changes she has going on in her thumb.  Previously has had x-rays in 2018 but I suggest that she see orthopedics or rheumatology and consider joint injection to help with pain.  I will refill her naproxen.  Has not benefited from splints.  Already have done some testing in 2018 for rheumatoid arthritis and all labs were negative  - naproxen (NAPROSYN) 500 MG tablet; Take 1 tablet (500 mg total) by mouth 2 (two) times a day with meals. Prn for pain  Dispense: 60 tablet; Refill: 2  - Ambulatory referral to Orthopedics    4. Hot flashes  -Has been working on weaning estradiol.  Continues to take half of a 0.5 mg tablet.    - estradioL (ESTRACE) 0.5 MG tablet; Take 0.5 tablets (0.25 mg total) by mouth daily.  Dispense: 45 tablet; Refill: 11    5. Eczema of scalp    I suspect her symptoms are more consistent with psoriatic arthritis and scalp psoriasis especially given findings in her distal hands.  The following labs were not ordered in 2018 so I will add those on and repeat sedimentation rate and CRP.  Instructed her on use of topical steroid foam but I would recommend if not improving that she go back to see dermatology.  I also may recommend that she has evaluation by rheumatology to see if there is any slowing of disfigurement with treatment options  - betamethasone valerate  0.12 % foam; Apply foam to scalp and behind ear once to twice daily as needed for flare ups up to 2 weeks at a time  Dispense: 50 g; Refill: 2  - HLA B27 Typing  - Sedimentation Rate  - C-Reactive Protein(CRP)  - Uric Acid  - CCP Antibodies    6. Arthritis of both hands  See above.  Previously had worked her up for rheumatoid arthritis which was negative.  Potential she could have seronegative rheumatoid arthritis but now given scalp lesions I would suspect potential for psoriatic arthritis.  Will order the following labs and once they come back I will likely make recommendation to follow-up with rheumatology  - HLA B27 Typing  - Sedimentation Rate  - C-Reactive Protein(CRP)  - Uric Acid  - CCP Antibodies        The patient's current medical problems were reviewed.    I have had an Advance Directives discussion with the patient.  The following health maintenance schedule was reviewed with the patient and provided in printed form in the after visit summary:   Health Maintenance   Topic Date Due     DXA SCAN  12/29/2017     MEDICARE ANNUAL WELLNESS VISIT  10/09/2021     FALL RISK ASSESSMENT  10/09/2021     MAMMOGRAM  11/21/2021     ADVANCE CARE PLANNING  12/27/2024     LIPID  10/09/2025     TD 18+ HE  11/17/2025     COLORECTAL CANCER SCREENING  08/06/2030     HEPATITIS C SCREENING  Completed     Pneumococcal Vaccine: Pediatrics (0 to 5 Years) and At-Risk Patients (6 to 64 Years)  Completed     Pneumococcal Vaccine: 65+ Years  Completed     INFLUENZA VACCINE RULE BASED  Completed     ZOSTER VACCINES  Completed     HEPATITIS B VACCINES  Aged Out        Subjective:   Chief Complaint: Yennifer Castro is an 69 y.o. female here for an Annual Wellness visit.   HPI:   Here for annual visit. Says that still having a lot of pain in L thumb that is painful on right. Xrays showed djd in wrist . Concerned about deformities in the distal DIP joints. Heberden nodes on all digits. Not a lot puts through ceiling. Does take  antiinflammatories. Notices with barometric pressure changes.  Thumb spica brace not helping.   Saw dermatology and forgot to ask about a cream - every once in awhile there is itching behind the ears. Remembers that was given a steroid cream. Doesn't happen all the time. Lotion burns.  Derm consultants. Some hair loss   Since surgery continues to have ongoing pain R foot. Post op  Plantar calcaneal spur with plantar fasciotomy. Sometimes numb and hurts to walk on. -Dr. Humphrey.     Has forms for advance directive but  Never filled oug.     dexa and mammo scheduled - takes 2000IU daily , taking vitamin E, and multivitamin  Hot flashes have been fine but last month been noticing more often getting them   On estrace 0.25mg daily    Couple times had benign positional vertigo episodes.       Review of Systems:     Please see above.  The rest of the review of systems are negative for all systems.    Patient Care Team:  Jenni Valentin DO as PCP - General (Family Medicine)  Jenni Valentin DO as Assigned PCP     Patient Active Problem List   Diagnosis     Menopausal symptoms     Seasonal allergies     Colon polyps     Arthritis of foot, left     AK (actinic keratosis)     Hyperlipidemia     Rosacea     Bunion, left     Joint pain in fingers of left hand     Degenerative arthritis of thumb, left     Essential hypertension     Squamous cell carcinoma in situ of skin of face     Calcaneal spur, right     Eczema of scalp     Past Medical History:   Diagnosis Date     Arthritis      Colon polyp     2012 and 2017     Endometriosis      History of anesthesia complications      History of transfusion      Hypertension       Past Surgical History:   Procedure Laterality Date     APPENDECTOMY       CYSTOSCOPY W/ URETERAL STENT PLACEMENT       HYSTERECTOMY       MOHS SURGERY  2019     [place] REMOVAL OF HEEL SPUR Right 2/17/2020    Procedure: EXCISION, BONE SPUR, FOOT, WITH PLANTAR FASCIA RELEASE;  Surgeon: Los Humphrey,  CON;  Location: [facility] OR;  Service: Podiatry     TOTAL ABDOMINAL HYSTERECTOMY W/ BILATERAL SALPINGOOPHORECTOMY Bilateral 1985    endometriosis      Family History   Problem Relation Age of Onset     Breast cancer Mother 40        metastasized to intestines      Hypertension Father      Diabetes Father      Coronary artery disease Father         bypass surgery     Esophageal cancer Father      Heart attack Father      Breast cancer Paternal Aunt      Kidney disease Brother         congenital     Diabetes type II Brother      Stroke Brother      Coronary artery disease Brother         bypass      Breast cancer Paternal Aunt       Social History     Socioeconomic History     Marital status:      Spouse name: Not on file     Number of children: Not on file     Years of education: Not on file     Highest education level: Not on file   Occupational History     Occupation: office    Social Needs     Financial resource strain: Not on file     Food insecurity     Worry: Not on file     Inability: Not on file     Transportation needs     Medical: Not on file     Non-medical: Not on file   Tobacco Use     Smoking status: Never Smoker     Smokeless tobacco: Never Used   Substance and Sexual Activity     Alcohol use: No     Drug use: No     Sexual activity: Yes     Partners: Male   Lifestyle     Physical activity     Days per week: Not on file     Minutes per session: Not on file     Stress: Not on file   Relationships     Social connections     Talks on phone: Not on file     Gets together: Not on file     Attends Mu-ism service: Not on file     Active member of club or organization: Not on file     Attends meetings of clubs or organizations: Not on file     Relationship status: Not on file     Intimate partner violence     Fear of current or ex partner: Not on file     Emotionally abused: Not on file     Physically abused: Not on file     Forced sexual activity: Not on file   Other Topics Concern     Not on  "file   Social History Narrative     Not on file      Current Outpatient Medications   Medication Sig Dispense Refill     cholecalciferol, vitamin D3, (VITAMIN D3) 2,000 unit cap Take 1 capsule by mouth daily.       estradioL (ESTRACE) 0.5 MG tablet Take 0.5 tablets (0.25 mg total) by mouth daily. 45 tablet 11     losartan (COZAAR) 50 MG tablet Take 1 tablet by mouth once daily for blood pressure 90 tablet 1     MULTIVITAMIN WITH MINERALS/LUT (MULTIVITAMIN 50 PLUS ORAL) Take 1 tablet by mouth daily.       vitamin E 400 unit capsule Take 400 Units by mouth daily.       betamethasone valerate 0.12 % foam Apply foam to scalp and behind ear once to twice daily as needed for flare ups up to 2 weeks at a time 50 g 2     naproxen (NAPROSYN) 500 MG tablet Take 1 tablet (500 mg total) by mouth 2 (two) times a day with meals. Prn for pain 60 tablet 2     No current facility-administered medications for this visit.       Objective:   Vital Signs:   Visit Vitals  /76   Pulse 85   Temp 98  F (36.7  C) (Oral)   Resp 14   Ht 5' 2.75\" (1.594 m)   Wt 159 lb (72.1 kg)   SpO2 97%   BMI 28.39 kg/m           VisionScreening:  No exam data present     PHYSICAL EXAM    General Appearance:    Alert, cooperative, no distress, appears stated age   Head:    Normocephalic, without obvious abnormality, atraumatic   Eyes:    PERRL, conjunctiva/corneas clear, EOM's intact, fundi     benign, both eyes   Ears:    Normal TM's and external ear canals, both ears   Nose:   Nares normal, septum midline, mucosa normal, no drainage    or sinus tenderness   Throat:   Lips, mucosa, and tongue normal; teeth and gums normal   Neck:   Supple, symmetrical, trachea midline, no adenopathy;     thyroid:  no enlargement/tenderness/nodules; no carotid    bruit or JVD   Back:     Symmetric, no curvature, ROM normal, no CVA tenderness   Lungs:     Clear to auscultation bilaterally, respirations unlabored   Chest Wall:    No tenderness or deformity    Heart:    " Regular rate and rhythm, S1 and S2 normal, no murmur, rub   or gallop   Breast Exam:    No tenderness, masses, or nipple abnormality   Abdomen:     Soft, non-tender, bowel sounds active all four quadrants,     no masses, no organomegaly   Genitalia:  e   Rectal:     Extremities rheumatologic:  Heberden nodes along almost all digits of hands extremities normal, atraumatic, no cyanosis or edema   Pulses:   2+ and symmetric all extremities   Skin:  Patient has plaque lesions along posterior scalp hairline.  Lesions behind the ears   Lymph nodes:   Cervical, supraclavicular, and axillary nodes normal   Neurologic:   CNII-XII intact, normal strength, sensation and reflexes     throughout       Assessment Results 10/9/2020   Activities of Daily Living No help needed   Instrumental Activities of Daily Living No help needed   Mini Cog Total Score 5   Some recent data might be hidden     A Mini-Cog score of 0-2 suggests the possibility of dementia, score of 3-5 suggests no dementia  Falls risk screening negative    Identified Health Risks:     Information regarding advance directives (living barraza), including where she can download the appropriate form, was provided to the patient via the AVS.

## 2021-06-12 NOTE — PROGRESS NOTES
FOOT AND ANKLE SURGERY/PODIATRY Progress Note        ASSESSMENT:   Right foot pain    HPI: Yennifer Castro was seen again today complaining of intermittent mild to moderate pain on the bottom of her right heel.  The patient is several month status post excision plantar calcaneal spur of the right foot.  The patient stated that the pain is a different pain that she had preoperatively.  She has some numbness and some mild tingling along the incision site.  She stated that while resting she has no pain she has pain with prolonged standing.  She has noticed it does alter her gait.  She finds herself limping because of the pain.  She does not have post static dyskinesia.  She stated that after she has been on her feet for several hours she would then sit and rest.  Once she stands again she does have pain.  The pain radiates from the heel to the arch of her foot.    Past Medical History:   Diagnosis Date     Arthritis      Colon polyp     2012 and 2017     Endometriosis      History of anesthesia complications      History of transfusion      Hypertension        Past Surgical History:   Procedure Laterality Date     APPENDECTOMY       CYSTOSCOPY W/ URETERAL STENT PLACEMENT       HYSTERECTOMY       MOHS SURGERY  2019     [place] REMOVAL OF HEEL SPUR Right 2/17/2020    Procedure: EXCISION, BONE SPUR, FOOT, WITH PLANTAR FASCIA RELEASE;  Surgeon: Los Humphrey DPM;  Location: [facility];  Service: Podiatry     TOTAL ABDOMINAL HYSTERECTOMY W/ BILATERAL SALPINGOOPHORECTOMY Bilateral 1985    endometriosis       Allergies   Allergen Reactions     Other Environmental Allergy      Penicillins      rash     Sulfa (Sulfonamide Antibiotics)      Mouth sores          Current Outpatient Medications:      betamethasone valerate 0.12 % foam, Apply foam to scalp and behind ear once to twice daily as needed for flare ups up to 2 weeks at a time, Disp: 50 g, Rfl: 2     cholecalciferol, vitamin D3, (VITAMIN D3) 2,000 unit cap,  Take 1 capsule by mouth daily., Disp: , Rfl:      estradioL (ESTRACE) 0.5 MG tablet, Take 0.5 tablets (0.25 mg total) by mouth daily., Disp: 45 tablet, Rfl: 11     losartan (COZAAR) 50 MG tablet, Take 1 tablet by mouth once daily for blood pressure, Disp: 90 tablet, Rfl: 1     MULTIVITAMIN WITH MINERALS/LUT (MULTIVITAMIN 50 PLUS ORAL), Take 1 tablet by mouth daily., Disp: , Rfl:      naproxen (NAPROSYN) 500 MG tablet, Take 1 tablet (500 mg total) by mouth 2 (two) times a day with meals. Prn for pain, Disp: 60 tablet, Rfl: 2     vitamin E 400 unit capsule, Take 400 Units by mouth daily., Disp: , Rfl:     Family History   Problem Relation Age of Onset     Breast cancer Mother 40        metastasized to intestines      Hypertension Father      Diabetes Father      Coronary artery disease Father         bypass surgery     Esophageal cancer Father      Heart attack Father      Breast cancer Paternal Aunt      Kidney disease Brother         congenital     Diabetes type II Brother      Stroke Brother      Coronary artery disease Brother         bypass      Breast cancer Paternal Aunt        Social History     Socioeconomic History     Marital status:      Spouse name: Not on file     Number of children: Not on file     Years of education: Not on file     Highest education level: Not on file   Occupational History     Occupation: office    Social Needs     Financial resource strain: Not on file     Food insecurity     Worry: Not on file     Inability: Not on file     Transportation needs     Medical: Not on file     Non-medical: Not on file   Tobacco Use     Smoking status: Never Smoker     Smokeless tobacco: Never Used   Substance and Sexual Activity     Alcohol use: No     Drug use: No     Sexual activity: Yes     Partners: Male   Lifestyle     Physical activity     Days per week: Not on file     Minutes per session: Not on file     Stress: Not on file   Relationships     Social connections     Talks on phone: Not  on file     Gets together: Not on file     Attends Mandaen service: Not on file     Active member of club or organization: Not on file     Attends meetings of clubs or organizations: Not on file     Relationship status: Not on file     Intimate partner violence     Fear of current or ex partner: Not on file     Emotionally abused: Not on file     Physically abused: Not on file     Forced sexual activity: Not on file   Other Topics Concern     Not on file   Social History Narrative     Not on file       10 point Review of Systems is negative       Vitals:    10/28/20 1434   Pulse: 83   SpO2: 99%       BMI= Body mass index is 28.39 kg/m .    OBJECTIVE:  General appearance: Patient is alert and fully cooperative with history & exam.  No sign of distress is noted during the visit.  Vascular: Dorsalis pedis and posterior tibial pulses are palpable. There is no pedal hair growth bilaterally.  CFT < 3 sec from anterior tibial surface to distal digits bilaterally. There is no appreciable edema noted.  Dermatologic: Turgor and texture are within normal limits. No coloration or temperature changes. No primary or secondary lesions noted.  Neurologic: All epicritic and proprioceptive sensations are grossly intact bilaterally.  Negative Tinel sign when percussing the tarsal tunnel right foot.  Musculoskeletal: All active and passive ankle, subtalar, midtarsal, and 1st MPJ range of motion are grossly intact without pain or crepitus, with the exception of none. Manual muscle strength is within normal limits bilaterally. All dorsiflexors, plantarflexors, invertors, evertors are intact bilaterally.  Minimal tenderness present to the plantar medial aspect of the right heel on palpation.  Minimal tenderness to plantar medial aspect of the right heel with range of motion. Calf is soft/non-tender without warmth/induration    Imaging:         No results found.         TREATMENT:  I recommended orthotics but the patient stated she  cannot afford a prescription orthotic.  I did recommend an OTC insert.  The patient has also been referred to physical therapy because informed patient she could have scar tissue could which could be impinging the nerve causing additional pressure on the plantar structures with weightbearing.  After completing physical therapy the patient is to return to the clinic for follow-up visit.  If her pain persist I will recommend an MRI of the right foot.        Los Humphrey; CON  [facility] Foot & Ankle Surgery/Podiatry

## 2021-06-13 NOTE — PROGRESS NOTES
[facility] Daily Progress     Patient Name: Yennifer Castro  Date: 12/4/2020  Visit #: 4  PTA visit #:  0  Referral Diagnosis: Right foot pain  Referring provider: Los Humphrey DPM  Visit Diagnosis:     ICD-10-CM    1. Chronic foot pain, right  M79.671     G89.29    2. Right foot pain  M79.671        Per initial evaluation:  The patient is status post surgery excision calcaneal spur  with plantar fasciotomy right foot 2/20.  She reports that she no longer had the pain that she had before surgery. Now it is a different pain constant pain and then if she is on it it will get worse.  When grocery shopping she is dragging her foot being like a flamingo and currently at rest 5/10  Then with initiall walking 6/10 and after walking.  The medial calcaneal branch of the tibial nerve was tight both above and below the incision. Did manual nerve gliding to help with this.  It was tender for the patient but she was able to tolerate this today.  The nerve was able to move about 50% better after manual mobilization.  Decreased ankle dorsiflexion with knee extended.  The patient with walking has a variable gait with mid stance, sometimes rolling in and sometimes rolling out. Discussed allowing her ankle to gently roll in as the pain allows.   Assessment:   Tightness just below the navicular just anterior to the scar today did STM, MFR and nerve mobilizations to this area today.  The patient had decreased roughness of the scar on the posterior aspect as well as slight improvement on the anterior aspect.  The nerve gliding on the medial calcaneal branch of the tibial nerve was WFL today as well as into the plantar aspect of the foot.  Discussed with the patient trialing cushioning to unload the scar region and assess.  Patient to continue with her exercises and with using the  elasto gel at night.   HEP/POC compliance is  good .  Patient demonstrates understanding/independence with home program.    Goal  Status:  Pt. will demonstrate/verbalize independence in self-management of condition in : 12 weeks;Comment;Met  Comment:: To aid in home management of symptoms.  Pt. will be independent with home exercise program in : 12 weeks;Comment;Met  Comment:: To aid in home management of symptoms.  Pt. will report decreased intensity, frequency of : Pain;in 12 weeks;Comment;Not Met  Comment:: patient's pain will decrease form constant to intermittent to aid in waking and standing so that she is able to do her household chores with only a slight increasein her pain.  Pt. will be able to walk : 10 minutes;60 minutes;with FWB;on even surfaces;around the house;with less pain;with less difficulty;for household mobility;for community mobility;in 12 weeks;Comment;Not Met  Comment:: The patient will decrease her pain with shopping from a 9/10 to 4-6/10 to aid in shopping and daily chore activities and patient will not have to stand on one foot when shopping.        Plan / Patient Education:   Plan:  Check for femoral ante or retro version.  Assess tibial length.  Recheck gait.  Continue to assess the nerve on the medial side of the foot as well as the tibial nerve up higher.    Do knee and hip clearing.       Continue with initial plan of care.  Progress with home program as tolerated.    Subjective:     Pain Ratin        Objective:   Tight central slip of the plantarfasciia..The patient reports that she feels swollen under the medial side of the heel and numbness in an egg shaped size from distal the scar.She continues to have decreased nerve mobility by the scar  both above and below the scar as well as into the bottom of the foot.  The patient has not tried the elastogel yet.  Trialed kinesiotape fan tape to the bottom of the foot and I tape for the scar medially.  The patient was instructed in how to massage her foot with or without the plantarfasciia on stretch.    Exercises:  Exercise #1: ankle inversion and  eversion  Comment #1: or ankle alphabet as tolerated  Exercise #2: supine nerve slider with ankle dorsiflexed  Comment #2: gently back and forth and when able to have the foot dorsiflexed and everted  Exercise #3: standing gastroc stretch.  Exercise #4: massage to the plantar fascia as well as the scar tissue.        Treatment Today     TREATMENT MINUTES COMMENTS   Evaluation     Self-care/ Home management     Manual therapy 25 Tightness just below the navicular just anterior to the scar today did STM, MFR and nerve mobilizations to this area today as well as briefly on the medial calcaneal branch of the tibial nerve but that was looking good today      Neuromuscular Re-education     Therapeutic Activity     Therapeutic Exercises 5  The patient was instructed in how to massage her foot with or without the plantarfasciia on stretch briefly reviewed and discussed options to padding to take the pressure off of the scar area with walking   Gait training     Modality__________________                Total 30    Blank areas are intentional and mean the treatment did not include these items.       Angela Morales PT  12/4/2020

## 2021-06-13 NOTE — PROGRESS NOTES
[facility] Daily Progress     Patient Name: Yennifer Castro  Date: 11/20/2020  Visit #: 2  PTA visit #:  0  Referral Diagnosis: Right foot pain  Referring provider: Los Humphrey DPM  Visit Diagnosis:     ICD-10-CM    1. Chronic foot pain, right  M79.671     G89.29    2. Right foot pain  M79.671        Per initial evaluation:  The patient is status post surgery excision calcaneal spur  with plantar fasciotomy right foot 2/20.  She reports that she no longer had the pain that she had before surgery. Now it is a different pain constant pain and then if she is on it it will get worse.  When grocery shopping she is dragging her foot being like a flamingo and currently at rest 5/10  Then with initiall walking 6/10 and after walking.  The medial calcaneal branch of the tibial nerve was tight both above and below the incision. Did manual nerve gliding to help with this.  It was tender for the patient but she was able to tolerate this today.  The nerve was able to move about 50% better after manual mobilization.  Decreased ankle dorsiflexion with knee extended.  The patient with walking has a variable gait with mid stance, sometimes rolling in and sometimes rolling out. Discussed allowing her ankle to gently roll in as the pain allows.   Assessment:   Repeated toe flexion no pain.  Tight central slip of the plantarfasciia..The patient reports that she feels swollen under the medial side of the heel and numbness in an egg shaped size from distal the scar.Lines of force were stiff through the heel medial and lateral as well as diagonally recoil was needed. The patient had decreased nerve gliding in both the right and the left sciatic nerve in the hamstrings as well as in the posterior calf bilaterally on the right she continues to have decreased nerve mobility by the scar  both above and below the scar as well as into the bottom of the foot.  Gave patient elasto gel to try for scarring at night.  HEP/POC  compliance is  good .  Patient demonstrates understanding/independence with home program.    Goal Status:  Pt. will demonstrate/verbalize independence in self-management of condition in : 12 weeks;Comment  Comment:: To aid in home management of symptoms.  Pt. will be independent with home exercise program in : 12 weeks;Comment  Comment:: To aid in home management of symptoms.  Pt. will report decreased intensity, frequency of : Pain;in 12 weeks;Comment  Comment:: patient's pain will decrease form constant to intermittent to aid in waking and standing so that she is able to do her household chores with only a slight increasein her pain.  Pt. will be able to walk : 10 minutes;60 minutes;with FWB;on even surfaces;around the house;with less pain;with less difficulty;for household mobility;for community mobility;in 12 weeks;Comment  Comment:: The patient will decrease her pain with shopping from a 9/10 to 4-6/10 to aid in shopping and daily chore activities and patient will not have to stand on one foot when shopping.        Plan / Patient Education:   Plan  assess Check for femoral ante or retro version.  Assess tibial length.  Recheck gait.  Continue to assess the nerve on the medial side of the foot as well as the tibial nerve up higher.    Do knee and hip clearing. WFL bilaterally hamstrings only slightly tight.    Feels swollen under the medial side of the heel egg shaped size from distal the scar.  Lines of froce through the heel medial and lateral as well as diagonally were needed.  Gave patient elasto gel to try for scarring  Continue with initial plan of care.  Progress with home program as tolerated.    Subjective:   Felt better for 1-2 days afterward then went back to normal today pain is a 5/10  Pain Ratin        Objective:   Feels swollen under the medial side of the heel per the patient egg shaped size from distal the scar.  Lines of froce through the heel medial and lateral as well as diagonally were  needed.  Gave patient elasto gel to try for scarring  General listening to the left posterior calf distally    Treatment Today     TREATMENT MINUTES COMMENTS   Evaluation     Self-care/ Home management     Manual therapy 22 .Lines of force were stiff through the heel medial and lateral as well as diagonally recoil was needed. The patient had decreased nerve gliding in both the right and the left sciatic nerve in the hamstrings as well as in the posterior calf bilaterally on the right she continues to have decreased nerve mobility by the scar  both above and below the scar as well as into the bottom of the foot.     Neuromuscular Re-education     Therapeutic Activity     Therapeutic Exercises 8  ball massage just let the ball rest under your arch of your foot until you feel the ankle move. Also patient demonstrated the  wedge stretch or book stretch for the gastrocnemius and the soleus.   Gait training     Modality__________________                Total 30    Blank areas are intentional and mean the treatment did not include these items.       Angela Morales PT  11/20/2020

## 2021-06-13 NOTE — PATIENT INSTRUCTIONS - HE
Use the book for the gastroc and the soleus stretch 30 seconds 3 reps adjust the stretch with the back leg     Use a ball on your foot just have your foot relaxed do not put a lot of pressure on it. Hold until the foot relaxes

## 2021-06-13 NOTE — PATIENT INSTRUCTIONS - HE
Using the ball in the AM and PM or massage right before bed and when you get up before you put your foot on the ground.  Also if you have been sitting for more than 15 minutes.

## 2021-06-13 NOTE — PROGRESS NOTES
[facility] Daily Progress     Patient Name: Yennifer Castro  Date: 11/24/2020  Visit #: 3  PTA visit #:  0  Referral Diagnosis: Right foot pain  Referring provider: Los Humphrey DPM  Visit Diagnosis:     ICD-10-CM    1. Chronic foot pain, right  M79.671     G89.29    2. Right foot pain  M79.671        Per initial evaluation:  The patient is status post surgery excision calcaneal spur  with plantar fasciotomy right foot 2/20.  She reports that she no longer had the pain that she had before surgery. Now it is a different pain constant pain and then if she is on it it will get worse.  When grocery shopping she is dragging her foot being like a flamingo and currently at rest 5/10  Then with initiall walking 6/10 and after walking.  The medial calcaneal branch of the tibial nerve was tight both above and below the incision. Did manual nerve gliding to help with this.  It was tender for the patient but she was able to tolerate this today.  The nerve was able to move about 50% better after manual mobilization.  Decreased ankle dorsiflexion with knee extended.  The patient with walking has a variable gait with mid stance, sometimes rolling in and sometimes rolling out. Discussed allowing her ankle to gently roll in as the pain allows.   Assessment:   Tight central slip of the plantarfasciia..The patient reports that she feels swollen under the medial side of the heel and numbness in an egg shaped size from distal the scar. She continues to have decreased nerve mobility by the scar  both above and below the scar as well as into the bottom of the foot.  The patient has not tried the elastogel yet.  Trialed kinesiotape fan tape to the bottom of the foot and I tape for the scar medially.  The patient was instructed in how to massage her foot with or without the plantarfasciia on stretch.  HEP/POC compliance is  good .  Patient demonstrates understanding/independence with home program.    Goal Status:  Pt.  will demonstrate/verbalize independence in self-management of condition in : 12 weeks;Comment  Comment:: To aid in home management of symptoms.  Pt. will be independent with home exercise program in : 12 weeks;Comment  Comment:: To aid in home management of symptoms.  Pt. will report decreased intensity, frequency of : Pain;in 12 weeks;Comment  Comment:: patient's pain will decrease form constant to intermittent to aid in waking and standing so that she is able to do her household chores with only a slight increasein her pain.  Pt. will be able to walk : 10 minutes;60 minutes;with FWB;on even surfaces;around the house;with less pain;with less difficulty;for household mobility;for community mobility;in 12 weeks;Comment  Comment:: The patient will decrease her pain with shopping from a 9/10 to 4-6/10 to aid in shopping and daily chore activities and patient will not have to stand on one foot when shopping.        Plan / Patient Education:   Plan  assess Check for femoral ante or retro version.  Assess tibial length.  Recheck gait.  Continue to assess the nerve on the medial side of the foot as well as the tibial nerve up higher.    Do knee and hip clearing. WFL bilaterally hamstrings only slightly tight.    Feels swollen under the medial side of the heel egg shaped size from distal the scar.  Lines of froce through the heel medial and lateral as well as diagonally were needed.  Gave patient elasto gel to try for scarring  Continue with initial plan of care.  Progress with home program as tolerated.    Subjective:   Felt better for 1-2 days afterward then went back to normal today pain is a 5/10  Pain Ratin        Objective:   Tight central slip of the plantarfasciia..The patient reports that she feels swollen under the medial side of the heel and numbness in an egg shaped size from distal the scar.She continues to have decreased nerve mobility by the scar  both above and below the scar as well as into the bottom  of the foot.  The patient has not tried the elastogel yet.  Trialed kinesiotape fan tape to the bottom of the foot and I tape for the scar medially.  The patient was instructed in how to massage her foot with or without the plantarfasciia on stretch.        Treatment Today     TREATMENT MINUTES COMMENTS   Evaluation     Self-care/ Home management     Manual therapy 22 Tight central slip of the plantarfasciia.. Did STM, MFR to the plantarfasciia .She continues to have decreased nerve mobility by the scar  both above and below the scar as well as into the bottom of the foot did manual nerve mobilizations to help with this.  Trialed kinesiotape fan tape to the bottom of the foot and I tape for the scar medially.  The patient was instructed in how to massage her foot with or without the plantarfasciia on stretch.   Neuromuscular Re-education 8 Trialed kinesiotape fan tape to the bottom of the foot and I tape for the scar medially.   Therapeutic Activity     Therapeutic Exercises 5  The patient was instructed in how to massage her foot with or without the plantarfasciia on stretch   Gait training     Modality__________________                Total 35    Blank areas are intentional and mean the treatment did not include these items.       Angela Morales PT  11/24/2020

## 2021-06-14 NOTE — PROGRESS NOTES
Cryotherapy Procedure Note    Pre-operative Diagnosis: AK x3+ irritated skin tag    Post-operative Diagnosis: Same    Locations: 2 upper left lip, left cheek keratotic lesion, left breast underside irritated skin tag     Indications: pre-malig/ irritation     Anesthesia: not required     Procedure Details   History of allergy to iodine: no.  Pacemaker? no.    Patient informed of risks (permanent scarring, infection, light or dark discoloration, bleeding, infection, weakness, numbness and recurrence of the lesion) and benefits of the procedure and verbal informed consent obtained.    The 4 areas are treated with liquid nitrogen therapy, frozen until ice ball extended 1 mm beyond lesion, allowed to thaw, and treated again. The patient tolerated procedure well.  The patient was instructed on post-op care, warned that there may be blister formation, redness and pain. Recommend OTC analgesia as needed for pain.    Condition:  Stable    Complications:  none    Plan:  1. Instructed to keep the area dry and covered for 24-48h and clean thereafter.  2. Warning signs of infection were reviewed.         I, Rylie Romero, am scribing for and in the presence of Dr. Valentin.  I, Dr. Jenni Valentin DO , personally performed the services described in this documentation as scribed by Rylie Romero in my presence, and it is both accurate and complete.

## 2021-06-14 NOTE — PROGRESS NOTES
[facility] Discharge Summary  Patient Name: Yennifer Castro  Date: 1/20/2021  Referral Diagnosis: Chronic Foot pain  Referring provider: Los Humphrey DPM  Visit Diagnosis:   1. Chronic foot pain, right     2. Right foot pain         Goals:  Pt. will demonstrate/verbalize independence in self-management of condition in : 12 weeks;Comment;Met  Comment:: To aid in home management of symptoms.  Pt. will be independent with home exercise program in : 12 weeks;Comment;Met  Comment:: To aid in home management of symptoms.  Pt. will report decreased intensity, frequency of : Pain;in 12 weeks;Comment;Not Met  Comment:: patient's pain will decrease form constant to intermittent to aid in waking and standing so that she is able to do her household chores with only a slight increasein her pain.  Pt. will be able to walk : 10 minutes;60 minutes;with FWB;on even surfaces;around the house;with less pain;with less difficulty;for household mobility;for community mobility;in 12 weeks;Comment;Not Met  Comment:: The patient will decrease her pain with shopping from a 9/10 to 4-6/10 to aid in shopping and daily chore activities and patient will not have to stand on one foot when shopping.    Chitra marquez, 5/10 on last visit she was seen    Patient was seen for 4 visits from 11/6/20 to 12/4/20 with 1 missed appointments.    Patient has not been in for 7 weeks and has no further PT appointments.    Therapy will be discontinued at this time.  The patient will need a new referral to resume.    Thank you for your referral.  Chela Zhang  1/20/2021  11:07 AM

## 2021-06-14 NOTE — PROGRESS NOTES
ASSESSMENT & PLAN:  Yennifer was seen today for annual exam.    Diagnoses and all orders for this visit:    Well woman exam  -     Lipid [place] FASTING  -     Comprehensive Metabolic Panel    Dry cough    Seasonal allergies    Skin lesion    Colon polyps    Menopausal symptoms    Lump of breast, right  -     Mammo Diagnostic Right; Future  -     US Breast Limited (Focal) Right; Future    Arthritis of foot, left  -     XR Foot Left 3 or More VWS; Future    AK (actinic keratosis)    Other orders  -     estradiol (ESTRACE) 1 MG tablet; Take 1 tablet (1 mg total) by mouth daily.      -Patient presented today with an annual physical.  Initially no significant concerns.  We discussed her abnormal mammogram that needs follow-up in 6 months and I have placed that future order.  Also she had polyp noted on colonoscopy that requires a follow-up in 3 years.  She does not have a uterus and therefore no further Pap smears are warranted.  We did discuss again at length her menopausal symptoms after hysterectomy and she has been on the estradiol 1 mg.  She did stop the medication and did fine for a month but symptoms returned and she went back on the medication.  We spent time discussing alternative options for treating her hot flashes and anxious symptoms especially since she has been having an increase in arthralgias I would opt for her to consider venlafaxine as a treatment option.  She is going to check to see if this is on her formulary.  We discussed other options for treatment of hot flashes that were not hormonal.  We also discussed that if she were to come off the estradiol that it requires a wean which may be why she had an increase in symptoms.  I would want her to go to half the dose for a month and then slowly wean off 1 tablet per week.  She was considering taking the medication every other day which I think is reasonable.  As long as she is on the estradiol I would continue annual mammograms.  We also discussed  vitamin Esupplementation.  She was concerned about the cost of her estradiol and I illustrated to her that it is on the $4 generic list at [facility] and she could have a transferred there if necessary.  -She also has some concerns about pain into her feet especially along the balls of her feet where there is not significant fat padding.  We discussed using orthotics or at least gel pads in this area.  Also has pain into the first MTP of the left foot without symptoms or signs of gout.  Also some hammertoe developing.  We discussed referral to podiatry versus getting baseline x-rays and that is what she has opted for.  No significant arthritis seen on first MTP as I expected.  We discussed orthotics which she will try over-the-counter and if she is interested in customized orthotics we can place that order for her.   -And at least 2-3 actinic keratoses on her face that were frozen off and one irritating skin tag below her left breast.  Treated today with liquid nitrogen see above  Patient Instructions   Consider taking vitamin E 400 IU daily for hot flashes.     Wean off estradiol by taking 0.5 mg every day for a month.  -then take the tablets 6 days per week for a few weeks, then 5 days per week, etc.    Estradiol is on the $4 generic list at [facility], so a 90 day prescription would cost $10 for you at [facility]. Ask them to not bill it through insurance.     There are certain antidepressants that work to help with hot flashes and menopause - consider this as an alternative to estradiol. Check with your insurance to see if venlafaxine covered - venlafaxine would be a good option. Let Dr. Valentin know if either is covered and she can call it in.     Will get xray of foot and determine if needs follow up      -consider gel pads for the balls of your feet or consider an insert like doctor scholls full insert tri-point        Orders Placed This Encounter   Procedures     Mammo Diagnostic Right     Standing Status:   Future      Standing Expiration Date:   11/13/2018     Order Specific Question:   Patient's previous breast density:     Answer:   Scattered fibroglandular density [2]     Order Specific Question:   Reason for Exam (Describe Symptoms):     Answer:   follow up abnormal breast lesion on R from 10/2017 -f/u 6 months     Order Specific Question:   Can the procedure be changed per Radiologist protocol?     Answer:   Yes     US Breast Limited (Focal) Right     Standing Status:   Future     Standing Expiration Date:   11/13/2018     Order Specific Question:   Reason for Exam (Describe Symptoms):     Answer:   follow up abnormal breast lesion on R from 10/2017 -f/u 6 months     Order Specific Question:   Can the procedure be changed per Radiologist protocol?     Answer:   Yes     XR Foot Left 3 or More VWS     Standing Status:   Future     Number of Occurrences:   1     Standing Expiration Date:   11/14/2018     Order Specific Question:   Reason for Exam (Describe Symptoms):     Answer:   Left foot pain- specifically 1st MTP. eval for arthritis, and 2nd toe     Order Specific Question:   Can the procedure be changed per Radiologist protocol?     Answer:   Yes     Lipid [place] FASTING     Order Specific Question:   Fasting is required?     Answer:   Yes     Comprehensive Metabolic Panel     Medications Discontinued During This Encounter   Medication Reason     estradiol (ESTRACE) 1 MG tablet Reorder       No Follow-up on file.     CHIEF COMPLAINT:  Chief Complaint   Patient presents with     Annual Exam     No questions or concerns        HISTORY OF PRESENT ILLNESS:  Yennifer is a 66 y.o. female presenting to the clinic today for an annual exam.     Right Breast Densities: She was told she needs to follow up in 6 months; one of the lumps in her right breast was a benign cyst, but the other lump was non-conclusive. She will need a right diagnostic mammogram in April 2017. She notes it was very costly for her to have a diagnostic  mammogram; she is wondering if she could just get a bilateral screening mammogram. Her screening mammogram was $50 and her diagnostic mammogram was $300.    Cough/Hoarse Voice: She woke up one morning about a month ago with a horribly sore throat. She still has a cough. She usually eats dinner around 5-6 pm and she doesn't eat after that. Her voice has been hoarse since that morning a month ago. She has seasonal allergies, and she always has a flare around the time she felt sick. Since her throat was so sore, she thought she was sick. She took Nancy Garfield Cold Plus for about a week, but the cough never went away.    Skin Lesions: She developed a skin lesion under her left eye this year which is new. She has a lot of skin tags in general. She has seborrheic keratoses all over her body; she is wondering if they are concerning. She has a skin lesion under her nose, but she blows her nose often because of her allergies.     Hot Flashes: She is still taking estradiol 1 mg. She stopped taking it cold turkey for about a month and she was fine until one day her hot flashes hit her like a ton of bricks; she started taking it about two weeks ago. She will be switching to Medicare and new insurance next year; she was taking Premarin in the past and she switched to estradiol for insurance purposes. She is hot during the hot flashes, but she also feels very anxious and panicky; it is not a good feeling. She gets the symptoms usually during the daytime; she may get them at night and not realize it. She wakes up hot sometimes, but the symptoms are most bothersome during the day.     Bilateral Foot Pain: Her feet are painful on the medial aspect when she walks. She feels like she is walking on tennis balls when she wakes up in the morning and walks barefoot. This can also happen after she is laying down in the chair for a while and then gets up to walk. She walks for exercise. She does not get new shoes often and she does not use  orthotics. The pain comes and goes, and she doesn't take any medication for it. She has been trying to stay away from ibuprofen, so she takes Tylenol for her aches and pain. Her left foot hurts now; her right foot hurt before, and the brace she wore for two months did not help. Her right foot got better and then started hurting again recently.     Health Maintenance: She is fasting today. She had a normal DXA scan in 2014.     REVIEW OF SYSTEMS:   Colon Polyps: She needs a follow colonoscopy in 3 years due to the presence of polyps. She had polyps at her last two colonoscopies.      She denies any hearing concerns. All other systems are negative.     PFSH:    History   Smoking Status     Never Smoker   Smokeless Tobacco     Not on file     Family History   Problem Relation Age of Onset     Breast cancer Mother 40     metastasized to intestines      Hypertension Father      Diabetes Father      Coronary artery disease Father      bypass surgery     Esophageal cancer Father      Heart attack Father      Breast cancer Paternal Aunt      Kidney disease Brother      congenital     Diabetes type II Brother      Stroke Brother      Breast cancer Paternal Aunt      Social History     Social History     Marital status:      Spouse name: N/A     Number of children: N/A     Years of education: N/A     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       Social History Main Topics     Smoking status: Never Smoker     Smokeless tobacco: Not on file     Alcohol use No     Drug use: No     Sexual activity: Yes     Partners: Male     Other Topics Concern     Not on file     Social History Narrative     Past Surgical History:   Procedure Laterality Date     APPENDECTOMY       CYSTOSCOPY W/ URETERAL STENT PLACEMENT       TOTAL ABDOMINAL HYSTERECTOMY W/ BILATERAL SALPINGOOPHORECTOMY Bilateral 1985    endometriosis     Allergies   Allergen Reactions     Other Environmental Allergy      Penicillins      Sulfa (Sulfonamide Antibiotics)   "    Active Ambulatory Problems     Diagnosis Date Noted     Menopausal symptoms 11/17/2015     Seasonal allergies 11/13/2017     Colon polyps 11/13/2017     Lump of breast, right 11/13/2017     Arthritis of foot, left 11/13/2017     AK (actinic keratosis) 11/13/2017     Hyperlipidemia 11/14/2017     Resolved Ambulatory Problems     Diagnosis Date Noted     Benign Essential Hypertension      Peroneus brevis tendinitis 11/17/2015     Past Medical History:   Diagnosis Date     Colon polyp      Endometriosis         VITALS:  Vitals:    11/13/17 1113 11/13/17 1116   BP: (!) 138/94 134/88   Pulse: 74    Resp: 12    Temp: 98.3  F (36.8  C)    TempSrc: Oral    Weight: 152 lb 6.4 oz (69.1 kg)    Height: 5' 2.75\" (1.594 m)      Wt Readings from Last 3 Encounters:   11/13/17 152 lb 6.4 oz (69.1 kg)   11/14/16 151 lb (68.5 kg)   11/17/15 166 lb (75.3 kg)     Body mass index is 27.21 kg/(m^2).  No LMP recorded. Patient has had a hysterectomy.     PHYSICAL EXAM:  GENERAL:  Reveals an alert female in NAD.  Vitals:  Per nursing notes.  EYES: PERRLA. Extraocular movements intact. Normal conjunctiva and lids.    ENT:  Hearing grossly normal. Normal appearance to ears and nose. Bilateral TM s, external canals, oropharynx normal. Normal lips, gums and teeth. Normal nasal mucosa, septum and turbinates.  NECK:  Supple, without thyromegaly or mass.  LUNGS:  Clear to auscultation without crackles, wheezes or distress. Normal respiratory effort.   CV:  Regular rate and rhythm without murmurs, rubs or gallops. No varicosities or edema. Carotids without bruits.  ABDOMEN:  Soft, non-tender, without hepatosplenomegaly, masses, or hernias.   BREASTS:  Non-tender, without masses, nipple discharge, erythema, or axillary adenopathy.    : Deferred  LYMPH: Normal palpation of neck, groin and axilla. No lymphadenopathy. No bruising.  NEURO:  CN II-XII intact.  PSYCH:  Alert & oriented with normal mood and affect.   SKIN:   She has findings of her " skin of sun damage.  Keratotic lesion left cheek, AK ×2 left upper lip, large skin tag irritated and erythematous below left breast  MSK: Normal gait and station.  No deformities of the feet other than formation of hammertoe second joint.  Decreased fat pad on the balls of the feet.  Range of motion of the first MTP but with significant pain to palpation.    Left Foot X-Ray:  Arthritic changes of the second through fifth DIPs on the left     DATA REVIEWED:  ADDITIONAL HISTORY SUMMARIZED (2): Reviewed colonoscopy report 8/4/2017.   DECISION TO OBTAIN EXTRA INFORMATION (1): None.   RADIOLOGY TESTS (1): Reviewed diagnostic mammogram report 10/26/2017. Ordered diagnostic mammogram and ultrasound. Ordered left foot x-ray.  LABS (1): Reviewed and ordered labs.  MEDICINE TESTS (1): None.  INDEPENDENT REVIEW (2 each): Reviewed today's left foot x-ray.     The visit lasted a total of 37 minutes face to face with the patient. Over 50% of the time was spent counseling and educating the patient about her cough, skin lesions, foot pain, chronic health conditions, medications, and health maintenance.     I, Rylie Romero, am scribing for and in the presence of Dr. Valentin.  Jenni MENDES DO , personally performed the services described in this documentation, as scribed by Rylie Romero in my presence, and it is both accurate and complete.    This note has been dictated using voice recognition software. Any grammatical or context distortions are unintentional and inherent to the software.     MEDICATIONS:  Current Outpatient Prescriptions   Medication Sig Dispense Refill     cholecalciferol, vitamin D3, (VITAMIN D3) 2,000 unit cap Take 1 capsule by mouth daily.       estradiol (ESTRACE) 1 MG tablet Take 1 tablet (1 mg total) by mouth daily. 90 tablet 2     MULTIVITAMIN WITH MINERALS/LUT (MULTIVITAMIN 50 PLUS ORAL) Take 1 tablet by mouth daily.       No current facility-administered medications for this visit.         Total data  points: 6

## 2021-06-15 NOTE — PROGRESS NOTES
Impression:  1.  Pharyngitis, probably viral.  Rapid strep test is negative.  Awaiting confirmatory test  2.  Hypertension treated but poorly controlled today    Plan:  Tylenol and fluids and rest.  Continue her losartan for blood pressure and have the blood pressure rechecked over the next several days to see if it comes back down.  Follow-up with primary care in the next 1 week for blood pressure recheck and further evaluation and treatment.  Seek care if new or worsening symptoms in the meantime      Chief Complaint:  Chief Complaint   Patient presents with     Adenopathy     white patch on throat, throat swelling x1 week          HPI:   Yennifer Castro is a 70 y.o. female who presents to this clinic for the evaluation of sore throat.  Patient complains of a 5 to 7-day history of pain with swallowing in her throat.  Couple of days ago she noticed a swollen gland on the left side of her neck.  It is slightly tender to the touch.  Today she looked in her mouth and saw white patch on the left side of her tonsil and was advised to come in and get checked.  No difficulty swallowing or breathing.  No fever or chills.  No rash.  No cough chest pain or shortness of breath.  She has not been exposed to anyone with strep or Covid that she knows of      PMH:   Past Medical History:   Diagnosis Date     Arthritis      Colon polyp     2012 and 2017     Endometriosis      History of anesthesia complications      History of transfusion      Hypertension      Past Surgical History:   Procedure Laterality Date     APPENDECTOMY       CYSTOSCOPY W/ URETERAL STENT PLACEMENT       HYSTERECTOMY       MOHS SURGERY  2019     NE REMOVAL OF HEEL SPUR Right 2/17/2020    Procedure: EXCISION, BONE SPUR, FOOT, WITH PLANTAR FASCIA RELEASE;  Surgeon: Los Humphrey DPM;  Location: [facility];  Service: Podiatry     TOTAL ABDOMINAL HYSTERECTOMY W/ BILATERAL SALPINGOOPHORECTOMY Bilateral 1985    endometriosis         ROS:  All  other systems negative    Meds:    Current Outpatient Medications:      betamethasone valerate 0.12 % foam, Apply foam to scalp and behind ear once to twice daily as needed for flare ups up to 2 weeks at a time, Disp: 50 g, Rfl: 2     cholecalciferol, vitamin D3, (VITAMIN D3) 2,000 unit cap, Take 1 capsule by mouth daily., Disp: , Rfl:      estradioL (ESTRACE) 0.5 MG tablet, Take 0.5 tablets (0.25 mg total) by mouth daily., Disp: 45 tablet, Rfl: 11     losartan (COZAAR) 50 MG tablet, Take 1 tablet by mouth once daily for blood pressure, Disp: 90 tablet, Rfl: 2     MULTIVITAMIN WITH MINERALS/LUT (MULTIVITAMIN 50 PLUS ORAL), Take 1 tablet by mouth daily., Disp: , Rfl:      naproxen (NAPROSYN) 500 MG tablet, Take 1 tablet (500 mg total) by mouth 2 (two) times a day with meals. Prn for pain, Disp: 60 tablet, Rfl: 2     vitamin E 400 unit capsule, Take 400 Units by mouth daily., Disp: , Rfl:         Social:  Social History     Socioeconomic History     Marital status:      Spouse name: Not on file     Number of children: Not on file     Years of education: Not on file     Highest education level: Not on file   Occupational History     Occupation: office    Social Needs     Financial resource strain: Not on file     Food insecurity     Worry: Not on file     Inability: Not on file     Transportation needs     Medical: Not on file     Non-medical: Not on file   Tobacco Use     Smoking status: Never Smoker     Smokeless tobacco: Never Used   Substance and Sexual Activity     Alcohol use: No     Drug use: No     Sexual activity: Yes     Partners: Male   Lifestyle     Physical activity     Days per week: Not on file     Minutes per session: Not on file     Stress: Not on file   Relationships     Social connections     Talks on phone: Not on file     Gets together: Not on file     Attends Confucianism service: Not on file     Active member of club or organization: Not on file     Attends meetings of clubs or organizations:  Not on file     Relationship status: Not on file     Intimate partner violence     Fear of current or ex partner: Not on file     Emotionally abused: Not on file     Physically abused: Not on file     Forced sexual activity: Not on file   Other Topics Concern     Not on file   Social History Narrative     Not on file         Physical Exam:  Vitals:    02/12/21 1859   BP: (!) 185/119   Patient Site: Right Arm   Patient Position: Sitting   Cuff Size: Adult Regular   Pulse: 85   Resp: 12   Temp: 98.1  F (36.7  C)   TempSrc: Oral   SpO2: 97%      Vital signs reviewed  Eyes: PERRL, EOMI  Head: Atraumatic and normocephalic  Pharynx: There is a little bit of injection, there is a white patch that appears to be adherent mucus on the left side of the anterior tonsil fossa, no exudate, airway patent  Neck: Supple, there is a very small slightly tender lymph node in the left anterior chain, no other adenopathy  Skin: No lesions or rash  Neuro: Normal motor and sensory function in all extremities  Psych: Awake, alert, normally responsive      Results:    Recent Results (from the past 24 hour(s))   Rapid Strep A Screen-Throat    Specimen: Throat   Result Value Ref Range    Rapid Strep A Antigen No Group A Strep detected, presumptive negative No Group A Strep detected, presumptive negative       No results found.      Juwan Kirby MD

## 2021-06-15 NOTE — TELEPHONE ENCOUNTER
Patient returning 's call. I did not see and documentation on a call today. Please advise if you call patient.   Patient does states that she is available the rest of the day.

## 2021-06-15 NOTE — PROGRESS NOTES
Called patient with no answer.  Left voicemail asking for her to call back and also left her a MyChart message.  An abnormal appearing 1.1 cm lymph node was seen on the lateral left neck ultrasound.  I called radiology and discussed these findings further.  They recommended a neck CT to further evaluate the etiology of this lymph node and determine if there is any extension into surrounding tissues.  Will place order for urgent CT neck and then determine next steps.    Sherlyn Tolentino, DO

## 2021-06-15 NOTE — TELEPHONE ENCOUNTER
Called scheduling. [facility] is booked out until 3/22, [facility] 3/29. She will also need COVID testing prior to the procedure.   Would you like us to call and see if she can be seen sooner anywhere else?

## 2021-06-15 NOTE — TELEPHONE ENCOUNTER
Just an FYI -    [facility]'t [facility] anything sooner. I reached out to [facility] and they are OK to get pt in Tuesday March 16th at 1 pm. No preop needed. I reached out to [facility] to help reschedule patient's covid test to Friday this week.

## 2021-06-15 NOTE — TELEPHONE ENCOUNTER
Called patient and reviewed CT neck results which was concerning for possible metastatic disease. Recommended fine needle aspiration for tissue diagnosis, will place this order STAT and will have our schedulers help her arrange this.     Sherlyn Tolentino, DO

## 2021-06-15 NOTE — PROGRESS NOTES
"Assessment/plan   1. Left cervical lymphadenopathy  Persistent left anterior cervical swelling since beginning of February 2021.  She had a negative strep at walk-in care about a month ago.  Continues to have swelling and posterior pharynx exudates.  Will repeat strep screen, obtain mono, and also evaluate for underlying abscess or lymphadenopathy with neck ultrasound to determine if antibiotics or oral steroid would be beneficial to her.  - Rapid Strep A Screen-Throat  - Mononucleosis Screen  - US Neck Limited; Future    2. Essential hypertension  Elevated blood pressure today and on prior walk-in care visit.  Increase losartan to 75 mg daily.  Return in 2 weeks for recheck.  - losartan (COZAAR) 50 MG tablet; Take 1.5 tablets (75 mg total) by mouth daily.  Dispense: 90 tablet; Refill: 2      Follow up: 2 weeks blood pressure    Sherlyn Tolentino DO    Options for treatment and follow-up care were reviewed with the patient. Yennifer Castro engaged in the decision making process and verbalized understanding of the options discussed and agreed with the final plan.    This note has been dictated using voice recognition software. Any grammatical or context distortions are unintentional and inherent to the software.      Subjective:      HPI: Yennifer Castro is a 70 y.o. female who is here for:    Chief Complaint   Patient presents with     Adenopathy     left side swollen glands, strep was negative 2/12, has not gotten any better     Swollen glands began beginning of February. Negative strep at walk-in care.  Has noticed a change in her voice. Feels run down, but otherwise fine. No trouble swallowing liquids or solids. No cough, congestion, fevers.  Has noticed a white spot in the back of her throat on the left.    Blood pressure elevated at walk-in clinic and at home in the 140s.  Typically takes her losartan in the evenings.    Objective:    /82   Pulse 82   Temp 97.4  F (36.3  C) (Oral)   Ht 5' 2.75\" " (1.594 m)   Wt 164 lb (74.4 kg)   BMI 29.28 kg/m      Physical Exam:   General: Alert, pleasant, cooperative, NAD  HEENT: NC/AT, EOMI, PERRL, normal conjunctivae and sclerae, MMM, no erythema or lesions in oropharynx, neck supple, left anterior cervical swelling, posterior pharynx with white exudate on left  CV: RRR, no murmurs, rubs or gallops  Respiratory: normal effort, lungs CTAB with good aeration throughout  Psych: mood neutral and affect appropriate    No results found for this or any previous visit (from the past 24 hour(s)).

## 2021-06-15 NOTE — PATIENT INSTRUCTIONS - HE
Ibuprofen or naproxen to help with inflammation.  Also consider salt water gargles.  We will await your test results and ultrasound to determine if an antibiotic or steroid would be most beneficial for you.

## 2021-06-15 NOTE — PROGRESS NOTES
Assessment/Plan:         Visit Diagnoses     Acute bronchitis    -  Primary    Relevant Medications    codeine-guaiFENesin (GUAIFENESIN AC)  mg/5 mL liquid    Cough        Relevant Medications    levoFLOXacin (LEVAQUIN) 250 MG tablet    Need for vaccination        Relevant Orders    Pneumococcal conjugate vaccine 13-valent 6wks-17yrs; >50yrs        Patient Instructions   1) Levaquin 250 mg daily for 10 days.  2) Robitussin AC 1-2 tsp every 4 hours as needed for cough.  Do not drive after taking this medication.  3) Follow up in 7-10 days if not improving, sooner if worsening or other concerns.   4) If you develop achilles tendon pain, stop Levaquin and contact us.     At this point she is getting worse rather than better and I am suspicious for a possible secondary bacterial bronchitis.  I am going to start her on Levaquin 250 mg daily for 10 days.  Cough medicine also given.  Follow-up if not improving.    Subjective:   Yennifer Castro is a 67 y.o. female who presents today complaining of cough, congestion, sore throat and malaise for about two weeks.  She hasn't checked a temp but has felt feverish intermittently.  No history of chronic lung disease and she is not a smoker.  Her  was ill with the same illness but he has improved.  Minimal sinus pain or pressure.  Cough is productive.     Patient Active Problem List   Diagnosis     Menopausal symptoms     Seasonal allergies     Colon polyps     Lump of breast, right     Arthritis of foot, left     AK (actinic keratosis)     Hyperlipidemia        Past Medical History:   Diagnosis Date     Colon polyp     2012 and 2017     Endometriosis         Past Surgical History:   Procedure Laterality Date     APPENDECTOMY       CYSTOSCOPY W/ URETERAL STENT PLACEMENT       TOTAL ABDOMINAL HYSTERECTOMY W/ BILATERAL SALPINGOOPHORECTOMY Bilateral 1985    endometriosis     Current Outpatient Prescriptions:      cholecalciferol, vitamin D3, (VITAMIN D3) 2,000 unit  "cap, Take 1 capsule by mouth daily., Disp: , Rfl:      estradiol (ESTRACE) 1 MG tablet, Take 1 tablet (1 mg total) by mouth daily., Disp: 90 tablet, Rfl: 2     MULTIVITAMIN WITH MINERALS/LUT (MULTIVITAMIN 50 PLUS ORAL), Take 1 tablet by mouth daily., Disp: , Rfl:       Review of Systems  See HPI     Objective:     Vitals:    01/09/18 1305   BP: 112/68   Patient Site: Left Arm   Patient Position: Sitting   Cuff Size: Adult Regular   Pulse: (!) 110   Resp: 20   Temp: 98.5  F (36.9  C)   TempSrc: Oral   SpO2: 98%   Weight: 150 lb (68 kg)   Height: 5' 2.75\" (1.594 m)        Physical Exam   Constitutional: She appears well-developed and well-nourished.   HENT:   Right Ear: Tympanic membrane and ear canal normal.   Left Ear: Tympanic membrane and ear canal normal.   Mouth/Throat: Oropharynx is clear and moist.   Eyes: Conjunctivae are normal.   Neck: Normal range of motion. Neck supple.   Cardiovascular: Normal rate and regular rhythm.    No murmur heard.  Pulmonary/Chest: Effort normal and breath sounds normal. No respiratory distress. She has no wheezes. She has no rales.   Frequent harsh cough.   Lymphadenopathy:     She has no cervical adenopathy.        "

## 2021-06-15 NOTE — TELEPHONE ENCOUNTER
Refill Approved    Rx renewed per Medication Renewal Policy. Medication was last renewed on 8/18/20.    Jose Stevens, [facility] Connection Triage/Med Refill 2/9/2021     Requested Prescriptions   Pending Prescriptions Disp Refills     losartan (COZAAR) 50 MG tablet [Pharmacy Med Name: Losartan Potassium 50 MG Oral Tablet] 90 tablet 0     Sig: Take 1 tablet by mouth once daily for blood pressure       Angiotensin Receptor Blocker Protocol Passed - 2/9/2021  9:34 AM        Passed - PCP or prescribing provider visit in past 12 months       Last office visit with prescriber/PCP: Visit date not found OR same dept: Visit date not found OR same specialty: 1/9/2018 Melanie Lim MD  Last physical: 10/9/2020 Last MTM visit: Visit date not found   Next visit within 3 mo: Visit date not found  Next physical within 3 mo: Visit date not found  Prescriber OR PCP: Jenni Valentin DO  Last diagnosis associated with med order: 1. Essential hypertension  - losartan (COZAAR) 50 MG tablet [Pharmacy Med Name: Losartan Potassium 50 MG Oral Tablet]; Take 1 tablet by mouth once daily for blood pressure  Dispense: 90 tablet; Refill: 0    If protocol passes may refill for 12 months if within 3 months of last provider visit (or a total of 15 months).             Passed - Serum potassium within the past 12 months     Lab Results   Component Value Date    Potassium 4.0 10/09/2020             Passed - Blood pressure filed in past 12 months     BP Readings from Last 1 Encounters:   10/09/20 138/76             Passed - Serum creatinine within the past 12 months     Creatinine   Date Value Ref Range Status   10/09/2020 0.80 0.60 - 1.10 mg/dL Final

## 2021-06-15 NOTE — TELEPHONE ENCOUNTER
Sorry to jump in but can we see if they can do it at the [facility]? Aside from covid test, can you verify she doesn't need a preop please.

## 2021-06-15 NOTE — TELEPHONE ENCOUNTER
Symptom  Describe your symptoms: Swollen Glands  Any pain: no  New/Ongoing: Ongoing  How long have you been having symptoms: week    Have you been seen for this:  No  Appointment offered?: Suggeted to make a e-visit  Triage offered?: no  Home remedies tried: no  Requested Pharmacy: [facility]  Okay to leave a detailed message? Yes

## 2021-06-15 NOTE — TELEPHONE ENCOUNTER
Patient calling to follow up to check with Dr. Tolentino and see if she should have the biopsy sooner than 3/22. Patient is wondering if there's any other places that can get her in sooner or if we can call to [facility] to get her in sooner. Communicated to patient, I will send the message to the provider's team. Patient states understanding and would like someone to call her back.

## 2021-06-16 PROBLEM — I10 ESSENTIAL HYPERTENSION: Status: ACTIVE | Noted: 2018-11-15

## 2021-06-16 PROBLEM — L71.9 ROSACEA: Status: ACTIVE | Noted: 2018-11-15

## 2021-06-16 PROBLEM — M21.612 BUNION, LEFT: Status: ACTIVE | Noted: 2018-11-15

## 2021-06-16 PROBLEM — M25.542 JOINT PAIN IN FINGERS OF LEFT HAND: Status: ACTIVE | Noted: 2018-11-15

## 2021-06-16 PROBLEM — K63.5 COLON POLYPS: Status: ACTIVE | Noted: 2017-11-13

## 2021-06-16 PROBLEM — L57.0 AK (ACTINIC KERATOSIS): Status: ACTIVE | Noted: 2017-11-13

## 2021-06-16 PROBLEM — L30.9 ECZEMA OF SCALP: Status: ACTIVE | Noted: 2020-10-11

## 2021-06-16 PROBLEM — D04.30 SQUAMOUS CELL CARCINOMA IN SITU OF SKIN OF FACE: Status: ACTIVE | Noted: 2019-12-27

## 2021-06-16 PROBLEM — M19.072 ARTHRITIS OF FOOT, LEFT: Status: ACTIVE | Noted: 2017-11-13

## 2021-06-16 PROBLEM — J30.2 SEASONAL ALLERGIES: Status: ACTIVE | Noted: 2017-11-13

## 2021-06-16 PROBLEM — E78.5 HYPERLIPIDEMIA: Status: ACTIVE | Noted: 2017-11-14

## 2021-06-16 PROBLEM — M77.31 CALCANEAL SPUR, RIGHT: Status: ACTIVE | Noted: 2020-01-23

## 2021-06-16 PROBLEM — M18.12 DEGENERATIVE ARTHRITIS OF THUMB, LEFT: Status: ACTIVE | Noted: 2018-11-15

## 2021-06-16 NOTE — TELEPHONE ENCOUNTER
Incoming call from patient needing Dr. Vlaentin's recommendations. Pt had an MRI scheduled yesterday but she didn't realized she's claustrophobic so it was cancelled and rescheduled to Thursday. Patient stated Dr. Parekh was going to prescribed her a sedative. Patient is wanting provider's thoughts on this if it will help. Patient would like a call back today if possible.

## 2021-06-16 NOTE — TELEPHONE ENCOUNTER
Spoke to patient and explained that Dr. Parekh wrote her a prescription for Ativan to take 30 minutes prior to her MRI and she can  the prescription at the  in [place]. She expressed understanding.    Corie Elizabeth RN  [facility]  [phone]

## 2021-06-16 NOTE — TELEPHONE ENCOUNTER
Patient notified. She is very worried about going in for the MRI and this happening again. Notified that the medication will help her to relax. She is wondering about possibly doing an open sided MRI. She will let us know if she changes her mind and would like to do the open sided MRI instead.

## 2021-06-16 NOTE — TELEPHONE ENCOUNTER
Telephone Encounter by Jany Ramirez LPN at 2/10/2020  9:04 AM     Author: Jany Ramirez LPN Service: -- Author Type: Licensed Nurse    Filed: 2/10/2020  9:07 AM Encounter Date: 1/30/2020 Status: Signed    : Jany Ramirez LPN (Licensed Nurse)       Patient Returning Call  Reason for call:  Patient retuning call  Information relayed to patient:  Jenni Valentin, DO   to [facility] Scheduling Registration Pool         2:36 PM   Note      Can use appointment time next Tuesday at 11 AM or 1120.  Likely only need 20 minutes with patient.  Can use any reserved spot otherwise next week or could even may be see at the end of the day on Friday this week if need be      Patient has additional questions:  No  If YES, what are your questions/concerns:  Patient agrees to come tomorrow at 11 AM or 11:20 Am for pre- ope Physicals . Please call patient  And conform appointment  .  Okay to leave a detailed message?: No

## 2021-06-16 NOTE — TELEPHONE ENCOUNTER
Yes a sedative should help. I called in diazepam, she should bring with to her appointment. She should have a .   She should arrive 30-60 min prior as they direct and she should let them know she is claustrophobic and was prescribed a medication so they can advise her to take It upon arrival . Sent to [facility]

## 2021-06-16 NOTE — TELEPHONE ENCOUNTER
Dr. Parekh order an MRI for Yennifer she went but paniced.  She rescheduled the MRI but wants a sedative for it.    Please call Yennifer and advise.  Thanks!

## 2021-06-16 NOTE — TELEPHONE ENCOUNTER
Reason for Call:  Medication refill    Do you use a [facility]?  Name of the pharmacy and phone number for the current request: [facility] [address] [place] [address]    Name of the medication requested: losartan (COZAAR) 50 MG tablet - Take 1.5 tablets (75 mg total) by mouth daily.    Other request: Patient called needing Dr. Tolentino or Dr. Valentin to send Rx to the pharmacy. Patient states Dr. Tolentino increased the dosage last time when she was in, but pharmacy is telling pt they do not have the Rx for the 75 mg.     Can we leave a detailed message on this number? No call back needed    Phone number patient can be reached at:   Cell number on file:    Telephone Information:   Mobile [phone]       Best Time: Anytime    Call taken on 4/23/2021 at 10:49 AM by Wan Luna

## 2021-06-16 NOTE — PROGRESS NOTES
Called patient to discuss FNA biopsy results.  Unfortunately, minimal sample obtained but did show lymphoid cells with atypia.  Will place urgent ENT referral, for possible surgical excision with further diagnostic value.    Final Diagnosis   LYMPH NODE, LEFT SIDE OF NECK, ULTRASOUND-GUIDED FINE NEEDLE ASPIRATION:     -  RARE LYMPHOID CELLS WITH CYTOLOGIC ATYPIA     -  ESSENTIALLY NON-DIAGNOSTIC SAMPLE     -  PLEASE SEE COMMENT   Electronically signed by Matt Amaro MD on 3/22/2021 at 1540   Comment     Rare atypical lymphoid cells are identified. There is too little tissue in the cell block to permit further characterization. If clinically appropriate, an open biopsy may yield diagnostic tissue. Recommend clinical correlation and follow-up.     Sherlyn Tolentino,

## 2021-06-16 NOTE — PROGRESS NOTES
HPI: This patient is a 71yo F who presents to clinic for evaluation of a neck mass at the request of Dr. Tolentino. It has been present on the left side for at least 2 months now. She noticed it one day when sh had her head resting in her hand and felt a fullness in her neck that hadn't been there before. She denies having been sick leading up to noticing it. Occasionally has a little rawness in the throat and fluctuating hoarseness, but nothing specific or changed since noticing this. Denies fevers, unintentional weight loss, odynophagia, dysphagia, hemoptysis, voice changes, and shortness of breath. Nonsmoker, nondrinker.    Past medical history, surgical history, social history, family history, medications, and allergies have been reviewed with the patient and are documented above.    Review of Systems: a 10-system review was performed. Pertinent positives are noted in the HPI and on a separate scanned document in the chart.    PHYSICAL EXAMINATION:  GEN: no acute distress, normocephalic  EYES: extraocular movements are intact, pupils are equal and round. Sclera clear.   EARS: auricles are normally formed. The external auditory canals are clear with minimal to no cerumen. Tympanic membranes are intact bilaterally with no signs of infection, effusion, retractions, or perforations.  NOSE: anterior nares are patent. There are no masses or lesions. The septum is non-obstructing.  OC/OP: clear, dentition is in good repair. The tongue and palate are fully mobile and symmetric. S/p tonsillectomy in 1st grade. There is a smooth-walled and soft cystic-appearing ~1cm outpouching of the left pharyngeal wall/tonsillar fossa. No BOT/FOM asymmetry or firmness.   HP/L (scope): s/p adenoidectomy. NP, base of tongue, vallecula, epiglottis, and pyriform sinuses are clear. Again, the ~1cm smooth-walled and soft cystic-appearing outpouching of the left pharyngeal wall/tonsillar fossa is seen without any mucosal abnormality  appreciated. The bilateral vocal folds are mobile and without lesion  NECK: soft and supple. Airway is midline. There is a fullness appreciated in the left level II neck that is deep and non-mobile.  NEURO: CN VII and XII symmetric. alert and oriented. No spontaneous nystagmus. Gait is normal.  PULM: breathing comfortably on room air, normal chest expansion with respiration  CARDS: no cyanosis or clubbing, normal carotid pulses    FLEXIBLE LARYNGOSCOPY: Scope inserted bilaterally to examine nasal tissue, nasopharynx, posterior oropharynx, hypopharynx, and larynx. See exam notes for exam finding details. Patient tolerated the procedure well.    CT NECK: **no visible abnormality on the scan to indicate the etiology of the outpouching of the lateral pharyngeal wall on the left. The carotid space mass is largely cystic and not completely round. It can be seen interdigitating between the vasculature in the axial views, not compressing or invading, which is not a feature typical of a metastatic lymph node. This carotid space location is also atypical for metastatic lymphadenopathy.  IMPRESSION:   1.  Mixed cystic/solid lesion within or adjacent to the anterior left carotid space measuring 1.4 x 1.4 x 2.5 cm. This is indeterminate, though given the internal blood flow demonstrated on recent ultrasound, this may represent a necrotic lymph node. Alternatively, this could represent a second branchial cleft cyst, venolymphatic malformation, or other cystic lesion. Recommend further characterization with fine needle aspiration.  2.  Scattered, mildly prominent lymph nodes in bilateral cervical level II a, left greater than right, as well as in the left subpectoral sedation. These are also indeterminate, though the rounded morphology of the subpectoral nodes is concerning for   possible metastatic disease.    FNA:  LYMPH NODE, LEFT SIDE OF NECK, ULTRASOUND-GUIDED FINE NEEDLE ASPIRATION:     -  RARE LYMPHOID CELLS WITH MINIMAL  CYTOLOGIC ATYPIA     -  ESSENTIALLY NON-DIAGNOSTIC SAMPLE     -  PLEASE SEE COMMENT-- show several lymphocytes and histiocytes with minimal cytologic atypia. Proteinaceous debris and blood are present in the   background along with occasional foamy macrophages    MEDICAL DECISION-MAKING: This patient is a 69yo F with a left carotid space cystic mass. The etiology of this is still unclear as the FNA was largely unhelpful. The CT scan has a mostly benign, cystic appearance showing it bulging around the vessels as opposed to pushing/compressing on them. Given the location in the carotid space, excisional biopsy is not my first instinct on this and think it is important to gain more information on this before embarking on any further procedure. Will get an MRI/MRA of the neck to eval for venolymphatic malformation vs carotid body mass vs other. Will contact her with the results and any indicated plan as soon as they are available.

## 2021-06-17 NOTE — PATIENT INSTRUCTIONS - HE
Patient Instructions by Paula White Scribe at 12/27/2019  3:20 PM     Author: Paula White Scribe Service: -- Author Type: Huma    Filed: 12/27/2019  4:32 PM Encounter Date: 12/27/2019 Status: Addendum    : Jenni Valentin DO (Physician)    Related Notes: Original Note by Jenni Valentin DO (Physician) filed at 12/27/2019  4:25 PM       -continue with the metrogel once daily for now since looking good, can increase to twice daily if pimples or redness flaring.   - when you run out, its ok to not do anything but if you start see flare again, try using the clindamycin sparingly in the morning daily.   -referral placed for podiatry. If not  Helpful, could consider a physical therapy referral in case there is any stretching you can do.    - consider referral to hand orthopedics for cortisone injection for the wrist    -consider using naproxen (aleve) - 500mg twice daily .   -for your penicillin allergy you can consider doing penicillin allergy testing with allergy referral  to get that taken off your list as potential allergies.     -start vitamin E 400IU once daily for hot flashes.  - start by taking your estradiol 0.5mg -take 1/2 tablet daily for a couple weeks and if doing well then eliminate 1/2 tablet one day a week then eliminate another 1/2 tablet every 2 weeks.   -the alternative is over the counter black cohosh is also helpful (herbal supplement) follow package instructions.   Or if not helpful , we can try gabapentin nerve medication prescription that can be helpful for hot flashes.   -message me if over the counter and weaning instructions not going well.   -change your lisinopril to losartan 25mg once daily and come back for blood pressure check within a couple weeks. Then see me back in 6 months     Patient Education   Understanding USDA MyPlate  The USDA (US Department of Agriculture) has guidelines to help you make healthy food choices. These are called MyPlate. MyPlate  shows the food groups that make up healthy meals using the image of a place setting. Before you eat, think about the healthiest choices for what to put onto your plate or into your cup or bowl. To learn more about building a healthy plate, visit www.choosemyplate.gov.       The Food Groups    Fruits: Any fruit or 100% fruit juice counts as part of the Fruit Group. Fruits may be fresh, canned, frozen, or dried, and may be whole, cut-up, or pureed. Make half your plate fruits and vegetables.    Vegetables: Any vegetable or 100% vegetable juice counts as a member of the Vegetable Group. Vegetables may be fresh, frozen, canned, or dried. They can be served raw or cooked and may be whole, cut-up, or mashed. Make half your plate fruits and vegetables.     Grains: All foods made from grains are part of the Grains Group. These include wheat, rice, oats, cornmeal, and barley such as bread, pasta, oatmeal, cereal, tortillas, and grits. Grains should be no more than a quarter of your plate. At least half of your grains should be whole grains.    Protein: This group includes meat, poultry, seafood, beans and peas, eggs, processed soy products (like tofu), nuts (including nut butters), and seeds. Make protein choices no more than a quarter of your plate. Meat and poultry choices should be lean or low fat.    Dairy: All fluid milk products and foods made from milk that contain calcium, like yogurt and cheese are part of the Dairy Group. (Foods that have little calcium, such as cream, butter, and cream cheese, are not part of the group.) Most dairy choices should be low-fat or fat-free.    Oils: These are fats that are liquid at room temperature. They include canola, corn, olive, soybean, and sunflower oil. Foods that are mainly oil include mayonnaise, certain salad dressings, and soft margarines. You should have only 5 to 7 teaspoons of oils a day. You probably already get this much from the food you eat.  Use [employer] to  Help Build Your Meals  The SuperTracker can help you plan and track your meals and activity. You can look up individual foods to see or compare their nutritional value. You can get guidelines for what and how much you should eat. You can compare your food choices. And you can assess personal physical activities and see ways you can improve. Go to www.chooseACTION SPORTSplate.gov/supertracker/.    2425-3068 [employer]. [address] [place] [address], [place] [address]. All rights reserved. This information is not intended as a substitute for professional medical care. Always follow your healthcare professional's instructions.           Patient Education   Understanding Advance Care Planning  Advance care planning is the process of deciding ones own future medical care. It helps ensure that if you cant speak for yourself, your wishes can still be carried out. The plan is a series of legal documents that note a persons wishes. The documents vary by state. Advance care planning may be done when a person has a serious illness that is expected to get worse. It may be done before major surgery. And it can help you and your family be prepared in case of a major illness or injury. Advance care planning helps with making decisions at these times.       A health care proxy is a person who acts as the voice of a patient when the patient cant speak for himself or herself. The name of this role varies by state. It may be called a Durable Medical Power of  or Durable Power of  for Healthcare. It may be called an agent, surrogate, or advocate. Or it may be called a representative or decision maker. It is an official duty that is identified by a legal document. The document also varies by state.    Why Is Advance Care Planning Important?  If a person communicates their healthcare wishes:    They will be given medical care that matches their values and goals.    Their family members will not be forced to make decisions in  a crisis with no guidance.  Creating a Plan  Making an advance care plan is often done in 3 steps:    Thinking about ones wishes. To create an advance care plan, you should think about what kind of medical treatment you would want if you lose the ability to communicate. Are there any situations in which you would refuse or stop treatment? Are there therapies you would want or not want? And whom do you want to make decisions for you? There are many places to learn more about how to plan for your care. Ask your doctor or  for resources.    Picking a health care proxy. This means choosing a trusted person to speak for you only when you cant speak for yourself. When you cannot make medical decisions, your proxy makes sure the instructions in your advance care plan are followed. A proxy does not make decisions based on his or her own opinions. They must put aside those opinions and values if needed, and carry out your wishes.    Filling out the legal documents. There are several kinds of legal documents for advance care planning. Each one tells health care providers your wishes. The documents may vary by state. They must be signed and may need to be witnessed or notarized. You can cancel or change them whenever you wish. Depending on your state, the documents may include a Healthcare Proxy form, Living Will, Durable Medical Power of , Advance Directive, or others.  The Familys Role  The best help a family can give is to support their loved ones wishes. Open and honest communication is vital. Family should express any concerns they have about the patients choices while the patient can still make decisions.    9002-4574 [employer]. [address] [place] [address], [place] [address]. All rights reserved. This information is not intended as a substitute for professional medical care. Always follow your healthcare professional's instructions.         Also, [facility] [place] offers a free,  downloadable health care directive that allows you to share your treatment choices and personal preferences if you cannot communicate your wishes. It also allows you to appoint another person (called a health care agent) to make health care decisions if you are unable to do so. You can download an advance directive by going here: [email or web address]     Patient Education   Personalized Prevention Plan  You are due for the preventive services outlined below.  Your care team is available to assist you in scheduling these services.  If you have already completed any of these items, please share that information with your care team to update in your medical record.  Health Maintenance   Topic Date Due   ? HEPATITIS C SCREENING  1950   ? DXA SCAN  12/29/2017   ? MEDICARE ANNUAL WELLNESS VISIT  11/15/2019   ? FALL RISK ASSESSMENT  11/15/2019   ? ZOSTER VACCINES (3 of 3) 09/19/2019   ? COLONOSCOPY  08/04/2020   ? MAMMOGRAM  11/21/2021   ? LIPID  11/15/2023   ? ADVANCE CARE PLANNING  11/15/2023   ? TD 18+ HE  11/17/2025   ? PNEUMOCOCCAL IMMUNIZATION 65+ LOW/MEDIUM RISK  Completed   ? INFLUENZA VACCINE RULE BASED  Completed

## 2021-06-17 NOTE — TELEPHONE ENCOUNTER
Telephone Encounter by Eri Carey at 5/13/2020  8:19 AM     Author: Eri Carey Service: -- Author Type: --    Filed: 5/13/2020  8:20 AM Encounter Date: 5/8/2020 Status: Signed    : Eri Carey APPROVED:    Approval start date: 04/11/2020  Approval end date:  05/11/2021    Pharmacy has been notified of approval and will contact patient when medication is ready for pickup.

## 2021-06-18 NOTE — PATIENT INSTRUCTIONS - HE
Patient Instructions by Jenni Valentin DO at 10/9/2020  9:20 AM     Author: Jenni Valentin DO Service: -- Author Type: Physician    Filed: 10/9/2020 10:05 AM Encounter Date: 10/9/2020 Status: Addendum    : Jenni Valentin DO (Physician)    Related Notes: Original Note by Jenni Valentin DO (Physician) filed at 10/9/2020  9:59 AM       -could consider  Rheumatology referral   -consider glucosamine with chondroitin to slow progression   -consider orthopedics for joint injection.  -use aquaphor behind the ears daily for maintenance at least once daily and can use as often as needed. Pat dry . If getting flares can use OTC hydrocortisone 1% once to twice daily as needed for itching. .      Patient Education   Understanding Advance Care Planning  Advance care planning is the process of deciding ones own future medical care. It helps ensure that if you cant speak for yourself, your wishes can still be carried out. The plan is a series of legal documents that note a persons wishes. The documents vary by state. Advance care planning may be done when a person has a serious illness that is expected to get worse. It may be done before major surgery. And it can help you and your family be prepared in case of a major illness or injury. Advance care planning helps with making decisions at these times.       A health care proxy is a person who acts as the voice of a patient when the patient cant speak for himself or herself. The name of this role varies by state. It may be called a Durable Medical Power of  or Durable Power of  for Healthcare. It may be called an agent, surrogate, or advocate. Or it may be called a representative or decision maker. It is an official duty that is identified by a legal document. The document also varies by state.    Why Is Advance Care Planning Important?  If a person communicates their healthcare wishes:    They will be given medical care that matches  their values and goals.    Their family members will not be forced to make decisions in a crisis with no guidance.  Creating a Plan  Making an advance care plan is often done in 3 steps:    Thinking about ones wishes. To create an advance care plan, you should think about what kind of medical treatment you would want if you lose the ability to communicate. Are there any situations in which you would refuse or stop treatment? Are there therapies you would want or not want? And whom do you want to make decisions for you? There are many places to learn more about how to plan for your care. Ask your doctor or  for resources.    Picking a health care proxy. This means choosing a trusted person to speak for you only when you cant speak for yourself. When you cannot make medical decisions, your proxy makes sure the instructions in your advance care plan are followed. A proxy does not make decisions based on his or her own opinions. They must put aside those opinions and values if needed, and carry out your wishes.    Filling out the legal documents. There are several kinds of legal documents for advance care planning. Each one tells health care providers your wishes. The documents may vary by state. They must be signed and may need to be witnessed or notarized. You can cancel or change them whenever you wish. Depending on your state, the documents may include a Healthcare Proxy form, Living Will, Durable Medical Power of , Advance Directive, or others.  The Familys Role  The best help a family can give is to support their loved ones wishes. Open and honest communication is vital. Family should express any concerns they have about the patients choices while the patient can still make decisions.    0851-1629 [employer]. [address] [place] [address], [place] [address]. All rights reserved. This information is not intended as a substitute for professional medical care. Always follow your  healthcare professional's instructions.         Also, [facility] offers a free, downloadable health care directive that allows you to share your treatment choices and personal preferences if you cannot communicate your wishes. It also allows you to appoint another person (called a health care agent) to make health care decisions if you are unable to do so. You can download an advance directive by going here: [email or web address]     Patient Education   Personalized Prevention Plan  You are due for the preventive services outlined below.  Your care team is available to assist you in scheduling these services.  If you have already completed any of these items, please share that information with your care team to update in your medical record.  Health Maintenance   Topic Date Due   ? DXA SCAN  12/29/2017   ? MEDICARE ANNUAL WELLNESS VISIT  10/09/2021   ? FALL RISK ASSESSMENT  10/09/2021   ? MAMMOGRAM  11/21/2021   ? LIPID  12/27/2024   ? ADVANCE CARE PLANNING  12/27/2024   ? TD 18+ HE  11/17/2025   ? COLORECTAL CANCER SCREENING  08/06/2030   ? HEPATITIS C SCREENING  Completed   ? Pneumococcal Vaccine: Pediatrics (0 to 5 Years) and At-Risk Patients (6 to 64 Years)  Completed   ? Pneumococcal Vaccine: 65+ Years  Completed   ? INFLUENZA VACCINE RULE BASED  Completed   ? ZOSTER VACCINES  Completed   ? HEPATITIS B VACCINES  Aged Out

## 2021-06-18 NOTE — PATIENT INSTRUCTIONS - HE
Patient Instructions by Angela Morales PT at 11/6/2020  7:00 AM     Author: Angela Morales PT Service: -- Author Type: Physical Therapist    Filed: 11/6/2020  8:13 AM Encounter Date: 11/6/2020 Status: Addendum    : Angela Morales PT (Physical Therapist)    Related Notes: Original Note by Angela Morales PT (Physical Therapist) filed at 11/6/2020  8:12 AM        GASTROCNEMIUS STRETCH    While standing and leaning against a wall, place one foot back behind you and bend the front knee until a gentle stretch is felt on the back of the lower leg.     Your back knee should be straight the entire time.      SOLEUS STRETCH    While standing and leaning against a wall, place one foot back behind you and bend the front knee until a gentle stretch is felt on the back of the lower leg.     Your back knee should be bent the entire time.       STRAIGHT LEG SLIDERS      Lie on your back bend your ankle towards your face   As you get better bend your ankle towards your fac and turn your heel out        Grab behind your knee slightly pulling your knee to your chest (you can use a towel if needed)     Straighten the leg as far as you can. Get a nice easy stretch. Do not hold     Bring the leg down     Repeat __________ times     Repeat __________ sets     Repeat _________ times per day    ANKLE PUMPS    Pump ankle up and down.      ANKLE CIRCLES    Move your ankle in a circular pattern one direction for several repetitions and then reverse the direction.       ANKLE ALPHABET    While in a seated position, write out the alphabet in the air with your big toe.    Your ankle should be moving as you perform this.    Or just turn your foot in and out.           Go ahead an allow your foot to turn in some on the right when you are walking.

## 2021-06-18 NOTE — PATIENT INSTRUCTIONS - HE
Patient Instructions by Juwan Kirby MD at 2/12/2021  6:40 PM     Author: Juwan Kirby MD Service: -- Author Type: Physician    Filed: 2/12/2021  7:14 PM Encounter Date: 2/12/2021 Status: Signed    : Juwan Kirby MD (Physician)         Patient Education     Viral Pharyngitis (Sore Throat)    You or your child have pharyngitis (sore throat). This infection is caused by a virus. It can cause throat pain that is worse when swallowing, aching all over, headache, and fever. The infection may be spread by coughing, kissing, or touching others after touching your mouth or nose. Antibiotic medicines do not work against viruses. They are not used for treating this illness.  Home care    If symptoms are severe, you or your child should rest at home. Return to work or school when you or your child feel well enough.     You or your child should drink plenty of fluids to prevent dehydration.    Use throat lozenges or numbing throat sprays to help reduce pain. Gargling with warm salt water will also help reduce throat pain. Dissolve 1/2 teaspoon of salt in 1 glass of warm water. Children can sip on juice or a popsicle. Children 5 years and older can also suck on a lollipop or hard candy.    Dont eat salty or spicy foods or give them to your child. These can be irritating to the throat.  Medicines for a child: You can give your child acetaminophen for fever, fussiness, or discomfort. In babies over 6 months of age, you may use ibuprofen instead of acetaminophen. If your child has chronic liver or kidney disease or ever had a stomach ulcer or GI bleeding, talk with your ruthy healthcare provider before giving these medicines. Aspirin should never be used by any child under 18 years of age who has a fever. It may cause severe liver damage.  Medicines for an adult: You may use acetaminophen or ibuprofen to control pain or fever, unless another medicine was prescribed for this. If you have chronic liver or  kidney disease or ever had a stomach ulcer or GI bleeding, talk with your healthcare provider before using these medicines.  Follow-up care  Follow up with a healthcare provider or our staff if you or your child are not getting better over the next week.  When to seek medical advice  Call your healthcare provider right away if any of these occur:    Fever as directed by your healthcare provider.  For children, seek care if:  ? Your child is of any age and has repeated fevers above 104 F (40 C).  ? Your child is younger than 2 years of age and has a fever of 100.4 F (38 C) for more than 1 day.  ? Your child is 2 years old or older and has a fever of 100.4 F (38 C) for more than 3 days.    New or worsening ear pain, sinus pain, or headache    Painful lumps in the back of neck    Stiff neck    Lymph nodes are getting larger    Cant swallow liquids, a lot of drooling, or cant open mouth wide due to throat pain    Signs of dehydration, such as very dark urine or no urine, sunken eyes, dizziness    Trouble breathing or noisy breathing    Muffled voice    New rash    Other symptoms are getting worse  Date Last Reviewed: 10/1/2017    6474-6216 [employer]. [address] [place] [address], [place] [address]. All rights reserved. This information is not intended as a substitute for professional medical care. Always follow your healthcare professional's instructions.

## 2021-06-20 ENCOUNTER — HEALTH MAINTENANCE LETTER (OUTPATIENT)
Age: 71
End: 2021-06-20

## 2021-06-21 NOTE — PROGRESS NOTES
Assessment and Plan:   Yennifer was seen today for welcome to medicare visit.    Diagnoses and all orders for this visit:    Welcome to Medicare preventive visit  -     Comprehensive Metabolic Panel    Encounter for screening for cardiovascular disorders-she has a strong family history of heart disease and screening EKG was normal today.  Medicare offers welcome to Medicare EKG  -     Electrocardiogram Perform and Read    Rosacea -discussed starting MetroGel-1% daily and if  not covered by insurance we will switch her to 0.75 mg once daily    AK (actinic keratosis)-I encouraged her to see dermatology again sent to also discussed some of the skin and nail changes into her fingernails and toenails with possible concern of psoriatic arthritis given some of the other changes and deformities of her joints    Arthritis of foot, left-discussed getting formalized orthotics and she is going to check with her insurance company first to see if covered.  If there is abnormal labs I encouraged her to consider seeing rheumatology as she is starting to have deformities into her hands as well    Menopausal symptoms hot flashes/-these are not always with exertion and so I do not think it is necessarily cardiac in origin but we discussed trying to wean again off of the estradiol and gave her a plan similar to what I gave her last year although she did not recall that I gave her any information regarding weaning.  Maybe we can get her on the lowest possible dose.  It is difficult to say if the estradiol is causing these breast cysts that keep calling her back in for recall.  With her family history of breast cancer I highly recommend continued annual screening    Bunion/plantar fasciitis-see above.  Offered podiatry referral which she would like to hold off on at this time.  We discussed formalized orthotics and she wants to check with her insurance company.  We discussed stretching and using ice and NSAIDs as well as staying in a  hard sole shoe for her plantar fasciitis    Hyperlipidemia LDL goal <130  -     Lipid Cascade FASTING  -     Thyroid Cascade    Hot flashes-menopausal versus secondary to thyroid dysfunction.  Labs ordered    Joint pain in fingers of left hand  -     Rheumatoid Factor Quant  -     CCP Antibodies  -     Antinuclear Antibody (NEGRITO) Cascade  -     Sedimentation Rate  -     C-Reactive Protein(CRP)  -     XR Hand Left 3 or More VWS; Future      Degenerative arthritis of thumb, left-prescription was given for her to take to [facility] for a thumb spica splint.  X-ray is ordered today to confirm  -     Wrist/Arm DME: Wrist Brace; Left; with thumb spica    Elevated blood pressure reading without diagnosis of hypertension-we did discuss going to [place] for blood pressure check in 2 weeks if still elevated at that time I recommend initiating medication therapy    Other orders  -     estradiol (ESTRACE) 0.5 MG tablet; Take 1 tablet (0.5 mg total) by mouth daily.  -     metroNIDAZOLE (METROGEL) 1 % gel; Apply sparingly to face once daily.  -     Pneumococcal polysaccharide vaccine 23-valent 1 yo or older, subq/IM      Patient instructions: -check with your insurance if they cover customized foot/shoe orthotics and let me know and I can place an order  -can consider podiatry referral.   -consider lowering your dose of the estradiol to 0.5mg daily. And if doing well with hot flashes for a couple months can try going to 1.2 tablet by mouth daily.   -if doing well you can try just eliminating 1 tablet a week.   -for hot flashes sometimes women do well with vitamin E 400IU daily or even black cohash supplement for hot flashes.   -definitely continue annual mammogram- check with insurance 3D mammogram for better sensitivity.   -we can always have you meet with Dr. Mac our breast surgeon.   --advance care directive -fill out and bring us a copy   -bone density screen advised next year    -recommend seeing a dermatologist this year  -[facility].   -if metrogel for rosacea is not covered I will call in the twice daily metrogel cream that is  0.75% let me know.   -make sure eating before exercise   -check with medica if they cover the Shingrix vaccine and if you can get at clinic or if it has to be the pharmacy    -with the forming joint deformities I am going to let labs. Sometimes they are normal but you could still consider seeing one of our rheumatologists.   - question the nail changes whether fungus or psoriatic   -we can get you into a splint to immobilize your thumb.     The patient's current medical problems were reviewed.    I have had an Advance Directives discussion with the patient.  The following health maintenance schedule was reviewed with the patient and provided in printed form in the after visit summary:   Health Maintenance   Topic Date Due     ZOSTER VACCINES (2 of 3) 01/08/2015     DXA SCAN  12/29/2017     PNEUMOCOCCAL POLYSACCHARIDE VACCINE AGE 65 AND OVER  01/09/2019 (Originally 12/29/2017)     FALL RISK ASSESSMENT  01/09/2019     COLONOSCOPY  08/04/2020     MAMMOGRAM  10/29/2020     ADVANCE DIRECTIVES DISCUSSED WITH PATIENT  11/17/2020     TD 18+ HE  11/17/2025     INFLUENZA VACCINE RULE BASED  Completed     TDAP ADULT ONE TIME DOSE  Completed     PNEUMOCOCCAL CONJUGATE VACCINE FOR ADULTS (PCV13 OR PREVNAR)  Completed        Subjective:   Chief Complaint: Yennifer Castro is an 67 y.o. female here for a Welcome to Medicare visit.   HPI:  R heel been bothering all through summer. Soaks feet, ibuprofen. Wearing inserts. Wearing hard sole shoes in house. Arthritis in left foot evaluated with xray last year.  - recent follow up mammogram was abnormal but follow up ultrasound showed benign cyst on left, last time was on R and wants to know why gets them . Pains here and there.   -emotional over brother's failing health. DM, heart disease and overweight.   -always been worrier.  She does  not feel like she needs any medication and it is not affecting her daily life  - has tried stopping estradiol -can go months then comes back to hot flashes. Doesn't take the estradiol regularly.   -pain joint 1st MCP left . R handed .  She is also noting deformity is into her distal fingertips.  She also has noticed bunion forming on her left foot for which we have done x-rays last year noted some arthritic changes.  -skin lesions on face she did go see  [facility] ago who froze a couple lesions but did not address anything such as rosacea or nail changes etc.      Review of Systems:     Please see above.  The rest of the review of systems are negative for all systems.    Patient Care Team:  Jenni Valentin DO as PCP - General (Family Medicine)     Patient Active Problem List   Diagnosis     Menopausal symptoms     Seasonal allergies     Colon polyps     Lump of breast, right     Arthritis of foot, left     AK (actinic keratosis)     Hyperlipidemia     Past Medical History:   Diagnosis Date     Colon polyp     2012 and 2017     Endometriosis       Past Surgical History:   Procedure Laterality Date     APPENDECTOMY       CYSTOSCOPY W/ URETERAL STENT PLACEMENT       TOTAL ABDOMINAL HYSTERECTOMY W/ BILATERAL SALPINGOOPHORECTOMY Bilateral 1985    endometriosis      Family History   Problem Relation Age of Onset     Breast cancer Mother 40        metastasized to intestines      Hypertension Father      Diabetes Father      Coronary artery disease Father         bypass surgery     Esophageal cancer Father      Heart attack Father      Breast cancer Paternal Aunt      Kidney disease Brother         congenital     Diabetes type II Brother      Stroke Brother      Breast cancer Paternal Aunt       Social History     Socioeconomic History     Marital status:      Spouse name: Not on file     Number of children: Not on file     Years of education: Not on file     Highest  "education level: Not on file   Social Needs     Financial resource strain: Not on file     Food insecurity - worry: Not on file     Food insecurity - inability: Not on file     Transportation needs - medical: Not on file     Transportation needs - non-medical: Not on file   Occupational History     Occupation: office    Tobacco Use     Smoking status: Never Smoker     Smokeless tobacco: Never Used   Substance and Sexual Activity     Alcohol use: No     Drug use: No     Sexual activity: Yes     Partners: Male   Other Topics Concern     Not on file   Social History Narrative     Not on file       Current Outpatient Medications   Medication Sig Dispense Refill     cholecalciferol, vitamin D3, (VITAMIN D3) 2,000 unit cap Take 1 capsule by mouth daily.       estradiol (ESTRACE) 1 MG tablet Take 1 tablet (1 mg total) by mouth daily. 90 tablet 2     MULTIVITAMIN WITH MINERALS/LUT (MULTIVITAMIN 50 PLUS ORAL) Take 1 tablet by mouth daily.       codeine-guaiFENesin (GUAIFENESIN AC)  mg/5 mL liquid Take 5-10 mL by mouth every 4 (four) hours as needed for cough. Do not drive or work after taking this medication. 240 mL 0     No current facility-administered medications for this visit.       Objective:   Vital Signs:   Visit Vitals  /74   Pulse 88   Temp 99  F (37.2  C) (Oral)   Resp 16   Ht 5' 3.25\" (1.607 m)   Wt 156 lb 6.4 oz (70.9 kg)   BMI 27.49 kg/m         EKG:  Screening EKG is allowed by Medicare shows normal sinus rhythm ventricular rate of 76 with no ST segment changes    VisionScreening:   Visual Acuity Screening    Right eye Left eye Both eyes   Without correction: 10/12.5 10/12.5 10/10   With correction:           PHYSICAL EXAM    General Appearance:    Alert, cooperative, no distress, appears stated age   Head:    Normocephalic, without obvious abnormality, atraumatic   Eyes:    PERRL, conjunctiva/corneas clear, EOM's intact, fundi     benign, both eyes   Ears:    Normal TM's and external ear canals, " both ears   Nose:   Nares normal, septum midline, mucosa normal, no drainage    or sinus tenderness   Throat:   Lips, mucosa, and tongue normal; teeth and gums normal   Neck:   Supple, symmetrical, trachea midline, no adenopathy;     thyroid:  no enlargement/tenderness/nodules; no carotid    bruit or JVD   Back:     Symmetric, no curvature, ROM normal, no CVA tenderness   Lungs:     Clear to auscultation bilaterally, respirations unlabored   Chest Wall:    No tenderness or deformity    Heart:    Regular rate and rhythm, S1 and S2 normal, no murmur, rub   or gallop   Breast Exam:    No tenderness, masses, or nipple abnormality   Abdomen:     Soft, non-tender, bowel sounds active all four quadrants,     no masses, no organomegaly   Genitalia:    Normal external female without lesion         Extremities/rheum:   Extremities normal, atraumatic, no cyanosis or edema/patient has deformities in her DIP joints of bilateral hands with outward deviation and [place] nodes.  She is forming bunion bilateral feet worse on the left.  Mildly painful to palpation.  She has pain in the calcaneus to palpation over fascia insertion.  Pain and swelling at the base of her first MCP on the left   Pulses:   2+ and symmetric all extremities   Skin:    Findings of rosacea on her face.  Nodule forehead between eyebrows raised, evidence of actinic damage.  Thickening of the fingernail and toenails   Lymph nodes:   Cervical, supraclavicular, and axillary nodes normal   Neurologic:   CNII-XII intact, normal strength, sensation and reflexes     throughout       Assessment Results 11/15/2018   Activities of Daily Living No help needed   Instrumental Activities of Daily Living No help needed   Mini Cog Total Score 5   Some recent data might be hidden     A Mini Cog score of 0-2 suggests the possibility of dementia, score of 3-5 suggests no dementia    Identified Health Risks:     The patient was counseled and encouraged to consider modifying their  diet and eating habits. She was provided with information on recommended healthy diet options.  Information regarding advance directives (living [place]), including where she can download the appropriate form, was provided to the patient via the AVS.

## 2021-06-22 NOTE — PROGRESS NOTES
I met with Yennifer Castro at the request of Dr. Valentin to recheck her blood pressure.  Blood pressure medications on the MAR were reviewed with patient.    Patient has taken all medications as per usual regimen: Yes, lisinopril 5 mg  Patient reports tolerating them without any issues or concerns: Yes    Vitals:    12/07/18 1023 12/07/18 1033   BP: 148/76 125/75   Patient Site: Left Arm Left Arm   Patient Position: Sitting Sitting   Cuff Size: Adult Regular Adult Regular   Pulse: 88 80       Blood pressure was taken, previous encounter was reviewed, recorded blood pressure below 140/90.  Patient was discharged and the note will be sent to the provider for final review.    Patient also wanted pcp to know that she didn't pick-up METROGEL due to the cost be $180. Patient was wondering if there was something else that could be prescribed that would be cheaper.

## 2021-06-22 NOTE — PROGRESS NOTES
Please call patient and let her know that with her persistently elevated blood pressure reading I would recommend initiating treatment with lisinopril 5 mg.  It is a very low dose medication.  I would call it into her pharmacy if she is in agreement.  And then I would schedule a nurse only and lab only appointment in 2 weeks for repeat blood pressure check and labs.  Please let me know if she is agreeable and I will call in the medication

## 2021-06-22 NOTE — PROGRESS NOTES
Let pt know I am pleased with her bp and have called in a 90 d supply of the lisinopril. Many of the alternatives for rosacea do not appear to be covered under her insurance either. There have been some studies with trialing clindamycin gel which I called in for once daily use in the morning and or adapalene 0.1 % which can be found over the counter now without prescription.

## 2021-06-29 NOTE — PROGRESS NOTES
Progress Notes by Angela Morales PT at 11/6/2020  7:00 AM     Author: Angela Morales PT Service: -- Author Type: Physical Therapist    Filed: 11/6/2020 10:12 AM Encounter Date: 11/6/2020 Status: Attested    : Angela Morales PT (Physical Therapist) Cosigner: Los Humphrey DPM at 11/6/2020 10:37 AM    Attestation signed by Los Humphrey DPM at 11/6/2020 10:37 AM    Los Humphrey DPM                    [facility] Certification Request    November 6, 2020      Patient: Yennifer Castro  MR Number: [id number]  YOB: 1950  Date of Visit: 11/6/2020      Dear Los Larios DPM:    Thank you for this referral.   We are seeing Yennifer Castro for Physical Therapy of right foot pain.    Medicare and/or Medicaid requires physician review and approval of the treatment plan. Please review the plan of care and verify that you agree with the therapy plan of care by co-signing this note.      Plan of Care  Authorization / Certification Start Date: 11/06/20  Authorization / Certification End Date: 02/06/21  Authorization / Certification Number of Visits: 12  Communication with: Referral Source  Patient Related Instruction: Nature of Condition;Treatment plan and rationale;Self Care instruction;Body mechanics;Precautions;Next steps;Expected outcome  Times per Week: 1  Number of Weeks: 12  Number of Visits: 12  Discharge Planning: when goals are met or plateau of progress  Precautions / Restrictions : none      Goals:  Pt. will demonstrate/verbalize independence in self-management of condition in : 12 weeks;Comment  Comment:: To aid in home management of symptoms.  Pt. will be independent with home exercise program in : 12 weeks;Comment  Comment:: To aid in home management of symptoms.  Pt. will report decreased intensity, frequency of : Pain;in 12 weeks;Comment  Comment:: patient's pain will decrease form constant to intermittent to aid in waking  and standing so that she is able to do her household chores with only a slight increasein her pain.  Pt. will be able to walk : 10 minutes;60 minutes;with FWB;on even surfaces;around the house;with less pain;with less difficulty;for household mobility;for community mobility;in 12 weeks;Comment  Comment:: The patient will decrease her pain with shopping from a 9/10 to 4-6/10 to aid in shopping and daily chore activities and patient will not have to stand on one foot when shopping.          If you have any questions or concerns, please don't hesitate to call.    Sincerely,      Angela Morales, PT        Physician recommendation:     ___ Follow therapist's recommendation        ___ Modify therapy      *Physician co-signature indicates they certify the need for these services furnished within this plan and while under their care.  [facility]   Foot/Ankle Initial Evaluation    Patient Name: Yennifer Castro  Date of evaluation: 11/6/2020  Referral Diagnosis: Right foot pain  Referring provider: Los Humphrey DPM  Visit Diagnosis:     ICD-10-CM    1. Chronic foot pain, right  M79.671     G89.29        Assessment:    The patient is status post surgery excision calcaneal spur  with plantar fasciotomy right foot 2/20.  She reports that she no longer had the pain that she had before surgery. Now it is a different pain constant pain and then if she is on it it will get worse.  When grocery shopping she is dragging her foot being like a flamingo and currently at rest 5/10  Then with initiall walking 6/10 and after walking.  The medial calcaneal branch of the tibial nerve was tight both above and below the incision. Did manual nerve gliding to help with this.  It was tender for the patient but she was able to tolerate this today.  The nerve was able to move about 50% better after manual mobilization.  Decreased ankle dorsiflexion with knee extended.  The patient with walking has a variable  gait with mid stance, sometimes rolling in and sometimes rolling out. Discussed allowing her ankle to gently roll in as the pain allows.    Pt. is appropriate for skilled PT intervention as outlined in the Plan of Care (POC).  Pt. is a good candidate for skilled PT services to improve pain levels and function.  Skilled Physical Therapy needed to increase ROM, increase strength, decrease pain and improve functional status.      Goals:  Pt. will demonstrate/verbalize independence in self-management of condition in : 12 weeks;Comment  Comment:: To aid in home management of symptoms.  Pt. will be independent with home exercise program in : 12 weeks;Comment  Comment:: To aid in home management of symptoms.  Pt. will report decreased intensity, frequency of : Pain;in 12 weeks;Comment  Comment:: patient's pain will decrease form constant to intermittent to aid in waking and standing so that she is able to do her household chores with only a slight increasein her pain.  Pt. will be able to walk : 10 minutes;60 minutes;with FWB;on even surfaces;around the house;with less pain;with less difficulty;for household mobility;for community mobility;in 12 weeks;Comment  Comment:: The patient will decrease her pain with shopping from a 9/10 to 4-6/10 to aid in shopping and daily chore activities and patient will not have to stand on one foot when shopping.      Goals were set in collaboration with the patient.  Barriers to Learning or Achieving Goals:  No Barriers.    Patient's expectations/goals are realistic.       Plan / Patient Instructions:        Plan of Care:   Authorization / Certification Start Date: 11/06/20  Authorization / Certification End Date: 02/06/21  Authorization / Certification Number of Visits: 12  Communication with: Referral Source  Patient Related Instruction: Nature of Condition;Treatment plan and rationale;Self Care instruction;Body mechanics;Precautions;Next steps;Expected outcome  Times per Week: 1  Number  of Weeks: 12  Number of Visits: 12  Discharge Planning: when goals are met or plateau of progress  Precautions / Restrictions : none      Plan for next visit: assess under the patient's foot on the plantarfasciia and check toe flexors.  Also check palpation on the calcaneus.  Check for femoral ante or retro version.  Assess tibial length.  Recheck gait.  Continue to assess the nerve on the medial side of the foot as well as the tibial nerve up higher.  Possibly add in ball stretches as well as wedge stretch for the gastrocnemius..   Do knee and hip clearing.     Subjective:           History of Present Illness:    Yennifer is a 69 y.o. female who presents to therapy today with complaints of surgery  there was a bone spur on her foot and it was very painful for 2 years. Now it is a different pain constant pain and then if she is on it it will get worse.  When grocery shopping she is dragging her foot being like a flamingo and currently at rest 5/10  Then with initiall walking 6/10 and after walking. Within 1 hour her pain is up to a 9/10 pain.  Take tylenol before bed she rubs it a lot but it doesn't seem to help.   Skilled Physical Therapy needed to increase ROM, increase strength, decrease pain and improve functional status.      Onset was after surgery. Symptoms are constant and not improving.       Pain Ratin  Pain rating at best: 5  Pain rating at worst: 9  Pain description: numbness and pain    Functional limitations are described as occurring with:   Standing and walking always have pain.       Objective:      Note: Items left blank indicates the item was not performed or not indicated at the time of the evaluation.    Patient Outcome Measures :    Significantly tight nerve on the medial side of the right foot on the calcaneus.    Lower Extremity Functional Scale (_/80): 49     Scores range from 0-80, where a score of 80 represents maximum function. The minimal clinically important difference is a  positive change of 9 points.    Ankle/Foot Examination  1. Chronic foot pain, right       Precautions: None  Involved Side: Right  Posture Observation:      General sitting posture is  fair.  General standing posture is fair.  Assistive Device: None  Gait Observation: The patient has a variable gait on the right side during midstance where she is going into pronation sometimes and supination sometimes.    Prone assessment  Rear foot right 4 left 0 forefoot right 10 left 8    Foot/Ankle ROM:     Date:      Ankle ROM( ) AROM in degrees AROM in degrees AROM in degrees    Right Left Right Left Right Left   Dorsiflexion, gastroc (10 ) 1 14       Dorsiflexion, soleus (20 ) 29 35       Plantar Flexion (50 ) 66 62       Inversion (45-60 ) 42 40       Eversion (15-30 ) 16 18       Great Toe Extension (70 )         Great Toe Flexion (MTP45 , IP90 )          PROM in degrees PROM in degrees PROM in degrees    Right Left Right Left Right Left   Dorsiflexion, gastroc (10 )         Dorsiflexion, soleus (20 )         Plantar Flexion (50 )         Inversion (45-60 )         Eversion (15-30 )         Great Toe Extension (70 )         Great Toe Flexion (MTP45 , IP90 )         talo crural joint WNLbilaterally  subtalor joint WNL bilaterally  Midtarsal joints slight excess motion bilaterally    Foot/Ankle Strength     Date:      Ankle/Foot Strength (/5) MMT MMT MMT    Right Left Right Left Right Left   Dorsiflexion Inversion 5/5 5/5       Dorsiflexion eversion 5/5 5/5       Plantarflexion Inversion Felt weak in the ball of her foot 5-/5 5/5       Plantarflexion Eversion 5/5 5/5       Great Toe Extension             Flexibility:  Palpation:  Left hip and greater trochanter and knee higher than the right very slightly   Navicular 2 fingers to the floor bilaterally  13 navicular drop bilaterally but on the right it is on average 2 mm higher    Lumbar flexion/ extension right and left sidebending WFL no change in foot pain but increase in  back pain with lumbar extension  General listening left posterior thigh just below the hip joint  Foot/Ankle Special Tests:      left  Ligament Tests Right (+/-) Left (+/-) Fracture Tests Right (+/-) Left (+/-)   Anterior Drawer Test WNL WNL Walton Ankle Rule     Talar Tilt Test WNL WNL Walton Foot Rule       Impingement Tests   Heel Tap (Bump) Test       Impingement sign   Squeeze Test     Impingement Sign cluster  1.ant-lat ankle tenderness  2.ant-lat ankle swelling  3.pain with forced DF and eversion  4.pain with SL squat  5.pain with activities  6.ankle instability  (?5 = +)   Tuning Fork Test     Achilles Tests Right (+/-) Left (+/-) Plantar Fasciitis Tests Right (+/-) Left (+/-)   Thopmsons Calf Squeeze test    Windlass (non-WBing) for plantar fasciitis     Arc Sign    Windlass (WBing) for plantar fasciitis     [facility] Test    Other     Other   Other     Other   Other         Treatment Today   Exercises:  Exercise #1: ankle inversion and eversion  Comment #1: or ankle alphabet as tolerated  Exercise #2: supine nerve slider with ankle dorsiflexed  Comment #2: gently back and forth and when able to have the foot dorsiflexed and everted  Exercise #3: standing gastroc stretch.      TREATMENT MINUTES COMMENTS   Evaluation 30 See evaluation   Self-care/ Home management     Manual therapy 15 The medial calcaneal branch of the tibial nerve was tight both above and below the incision. Did manual nerve gliding to help with this.    Neuromuscular Re-education     Therapeutic Activity     Therapeutic Exercises 15 See exercises   Gait training     Modality__________________                Total 60    Blank areas are intentional and mean the treatment did not include these items.   PT Evaluation Code: (Please list factors)    Patient History/Comorbidities: none  Examination: right foot pain  Clinical Presentation: stable  Clinical Decision Making: low    Patient History/  Comorbidities Examination  (body structures and  functions, activity limitations, and/or participation restrictions) Clinical Presentation Clinical Decision Making (Complexity)   No documented Comorbidities or personal factors 1-2 Elements Stable and/or uncomplicated Low   1-2 documented comorbidities or personal factor 3 Elements Evolving clinical presentation with changing characteristics Moderate   3-4 documented comorbidities or personal factors 4 or more Unstable and unpredictable High                nAgela Morales PT  11/6/2020  7:04 AM

## 2021-07-03 NOTE — ADDENDUM NOTE
Addendum Note by Saeed Harris DO at 12/7/2018 10:00 AM     Author: Saeed Harris DO Service: -- Author Type: Physician    Filed: 12/9/2018 10:44 PM Encounter Date: 12/7/2018 Status: Signed    : Saeed Harris DO (Physician)    Addended by: SAEED HARRIS on: 12/9/2018 10:44 PM        Modules accepted: Orders

## 2021-07-03 NOTE — ADDENDUM NOTE
Addendum Note by Saeed Harris DO at 1/16/2020 10:00 AM     Author: Saeed Harris DO Service: -- Author Type: Physician    Filed: 1/20/2020  3:22 PM Encounter Date: 1/16/2020 Status: Signed    : Saeed Harris DO (Physician)    Addended by: SAEED HARRIS on: 1/20/2020 03:22 PM        Modules accepted: Orders

## 2021-07-03 NOTE — ADDENDUM NOTE
Addendum Note by Saeed Harris DO at 11/29/2018  9:10 AM     Author: Saeed Harris DO Service: -- Author Type: Physician    Filed: 11/29/2018  4:19 PM Encounter Date: 11/29/2018 Status: Signed    : Saeed Harris DO (Physician)    Addended by: SAEED HARRIS on: 11/29/2018 04:19 PM        Modules accepted: Orders

## 2021-07-04 NOTE — ADDENDUM NOTE
Addendum Note by Severson, Tammie F, LPN at 3/9/2021  6:08 PM     Author: Severson, Tammie F, LPN Service: -- Author Type: Licensed Nurse    Filed: 3/10/2021  9:50 AM Encounter Date: 3/9/2021 Status: Signed    : Severson, Tammie F, LPN (Licensed Nurse)    Addended by: SEVERSON, TAMMIE F on: 3/10/2021 09:50 AM        Modules accepted: Orders

## 2021-07-04 NOTE — ADDENDUM NOTE
Addendum Note by Annamarie Subramanian DO at 3/4/2021  8:00 AM     Author: Annamarie Subramanian DO Service: -- Author Type: Physician    Filed: 3/4/2021  2:59 PM Encounter Date: 3/4/2021 Status: Signed    : Annamarie Subramanian DO (Physician)    Addended by: ANNAMARIE SUBRAMANIAN on: 3/4/2021 02:59 PM        Modules accepted: Orders

## 2021-07-13 ENCOUNTER — RECORDS - HEALTHEAST (OUTPATIENT)
Dept: ADMINISTRATIVE | Facility: CLINIC | Age: 71
End: 2021-07-13

## 2021-07-21 ENCOUNTER — RECORDS - HEALTHEAST (OUTPATIENT)
Dept: ADMINISTRATIVE | Facility: CLINIC | Age: 71
End: 2021-07-21

## 2021-07-22 ENCOUNTER — RECORDS - HEALTHEAST (OUTPATIENT)
Dept: FAMILY MEDICINE | Facility: CLINIC | Age: 71
End: 2021-07-22

## 2021-07-22 DIAGNOSIS — Z12.31 OTHER SCREENING MAMMOGRAM: ICD-10-CM

## 2021-08-12 ENCOUNTER — TELEPHONE (OUTPATIENT)
Dept: FAMILY MEDICINE | Facility: CLINIC | Age: 71
End: 2021-08-12

## 2021-08-13 NOTE — TELEPHONE ENCOUNTER
Prior Authorization Approval    Authorization Effective Date: 7/14/2021  Authorization Expiration Date: 8/13/2022  Medication: estradiol-APPROVED  Approved Dose/Quantity:   Reference #:     Insurance Company: [employer] - Phone [phone] Fax [phone]  Expected CoPay:       CoPay Card Available:      Foundation Assistance Needed:    Which Pharmacy is filling the prescription (Not needed for infusion/clinic administered): [facility] [address] [place] [address]  Pharmacy Notified: Yes  Patient Notified: No    Pharmacy will notify patient when medication is ready.

## 2021-08-13 NOTE — TELEPHONE ENCOUNTER
Central Prior Authorization Team   Phone: [phone]      PA Initiation    Medication: estradiol  Insurance Company: [employer] - Phone [phone] Fax [phone]  Pharmacy Filling the Rx: [facility] [address]  Filling Pharmacy Phone: [phone]  Filling Pharmacy Fax:    Start Date: 8/13/2021

## 2021-08-17 ENCOUNTER — TELEPHONE (OUTPATIENT)
Dept: FAMILY MEDICINE | Facility: CLINIC | Age: 71
End: 2021-08-17

## 2021-08-17 DIAGNOSIS — G89.29 CHRONIC PAIN OF LEFT THUMB: Primary | ICD-10-CM

## 2021-08-17 DIAGNOSIS — M79.645 CHRONIC PAIN OF LEFT THUMB: Primary | ICD-10-CM

## 2021-08-17 NOTE — TELEPHONE ENCOUNTER
Reason for Call:  Other REFERRAL    Detailed comments:  Pt calling to see if she should be seen or get a referral to ortho for her left thumb pain.  Pt said it is getting worse    Phone Number Patient can be reached at: Cell number on file:    Telephone Information:   Mobile [phone]       Best Time: na    Can we leave a detailed message on this number? YES    Call taken on 8/17/2021 at 3:16 PM by Ankita Dorado

## 2021-08-17 NOTE — TELEPHONE ENCOUNTER
Our last xray was in 2018. I think it would be best with worsening symptoms to see orthopedics and likely a steroid injection may help. I will place referral for [facility]. Can give her then number to schedule and fax info   -routing to [facility] [place] and specialty pool

## 2021-08-17 NOTE — TELEPHONE ENCOUNTER
Reason for Call:  Other call back    Detailed comments: pt seen by Dr Parekh in March 202, had MRI - at that time Dr Parekh told pt that she would reach out to patient in 3 months for follow up but pt has not heard from ENT clinic.  Please advise & call pt    Phone Number Patient can be reached at: Cell number on file:    Telephone Information:   Mobile [phone]       Best Time: na    Can we leave a detailed message on this number? YES       Please reassign message if sent to incorrect pool    Call taken on 8/17/2021 at 3:19 PM by Ankita Dorado

## 2021-08-17 NOTE — TELEPHONE ENCOUNTER
Left thumb has bothered her for years.  Had x ray done at one point, but nothing has been found to cause the pain.  It has progressively gotten worse.  Now it just hurts all the time and can get sharp pains with certain movements.  No strength to open jars or zip lock baggies.  Pain is around the joint closest to her hand.  No swelling, warmth or redness.  Bengay and diclofenec does not seem to help.      Should she be seen or go to ortho?  Maybe cortisone injection?

## 2021-08-18 NOTE — TELEPHONE ENCOUNTER
Spoke to patient. She states that Dr. Parekh wanted to follow up with her in 3 months to recheck the mass on her neck. Appointment scheduled.      Corie Elizabeth RN  [facility]  [phone]

## 2021-09-15 ENCOUNTER — OFFICE VISIT (OUTPATIENT)
Dept: OTOLARYNGOLOGY | Facility: CLINIC | Age: 71
End: 2021-09-15
Payer: COMMERCIAL

## 2021-09-15 DIAGNOSIS — R22.1 NECK MASS: Primary | ICD-10-CM

## 2021-09-15 PROCEDURE — 99214 OFFICE O/P EST MOD 30 MIN: CPT | Performed by: OTOLARYNGOLOGY

## 2021-09-15 NOTE — LETTER
9/15/2021         RE: Yennifer Castro  [address]  [place] [facility] [address]        Dear Colleague,    Thank you for referring your patient, Yennifer Castro, to the [facility]. Please see a copy of my visit note below.    HPI: This patient is a 69yo F who presents to clinic for follow-up of carotid space cystic mass. She has not noticed any symptoms with regards to her throat or neck other than bilateral trapezius tightness in the mornings when she wakes up. Denies fevers, unintentional weight loss, odynophagia, dysphagia, hemoptysis, voice changes, and shortness of breath. Nonsmoker, nondrinker.     Past medical history, surgical history, social history, family history, medications, and allergies have been reviewed with the patient and are documented above.     Review of Systems: a 10-system review was performed. Pertinent positives are noted in the HPI and on a separate scanned document in the chart.     PHYSICAL EXAMINATION:  GEN: no acute distress, normocephalic  EYES: extraocular movements are intact, pupils are equal and round. Sclera clear.   EARS: auricles are normally formed. The external auditory canals are clear with minimal to no cerumen. Tympanic membranes are intact bilaterally with no signs of infection, effusion, retractions, or perforations.  NOSE: anterior nares are patent. There are no masses or lesions. The septum is non-obstructing.  OC/OP: clear, dentition is in good repair. The tongue and palate are fully mobile and symmetric. S/p tonsillectomy in 1st grade. There is a smooth-walled and soft flap-like ~1cm outpouching of the left pharyngeal wall/tonsillar fossa, completely unchanged in 6mo. No BOT/FOM asymmetry or firmness.   NECK: soft and supple. Airway is midline. There is a fullness appreciated in the left level II neck that is deep and non-mobile.  NEURO: CN VII and XII symmetric. alert and oriented. No spontaneous nystagmus. Gait is normal.  PULM:  breathing comfortably on room air, normal chest expansion with respiration  CARDS: no cyanosis or clubbing, normal carotid pulses     FLEXIBLE LARYNGOSCOPY: Scope inserted bilaterally to examine nasal tissue, nasopharynx, posterior oropharynx, hypopharynx, and larynx. See exam notes for exam finding details. Patient tolerated the procedure well.     CT NECK 3/21: **no visible abnormality on the scan to indicate the etiology of the outpouching of the lateral pharyngeal wall on the left. The carotid space mass is largely cystic and not completely round. It can be seen interdigitating between the vasculature in the axial views, not compressing or invading, which is not a feature typical of a metastatic lymph node. This carotid space location is also atypical for metastatic lymphadenopathy.  IMPRESSION:   1.  Mixed cystic/solid lesion within or adjacent to the anterior left carotid space measuring 1.4 x 1.4 x 2.5 cm. This is indeterminate, though given the internal blood flow demonstrated on recent ultrasound, this may represent a necrotic lymph node. Alternatively, this could represent a second branchial cleft cyst, venolymphatic malformation, or other cystic lesion. Recommend further characterization with fine needle aspiration.  2.  Scattered, mildly prominent lymph nodes in bilateral cervical level II a, left greater than right, as well as in the left subpectoral sedation. These are also indeterminate, though the rounded morphology of the subpectoral nodes is concerning for   possible metastatic disease.     FNA 3/21:  LYMPH NODE, LEFT SIDE OF NECK, ULTRASOUND-GUIDED FINE NEEDLE ASPIRATION:     -  RARE LYMPHOID CELLS WITH MINIMAL CYTOLOGIC ATYPIA     -  ESSENTIALLY NON-DIAGNOSTIC SAMPLE     -  PLEASE SEE COMMENT-- show several lymphocytes and histiocytes with minimal cytologic atypia. Proteinaceous debris and blood are present in the background along with occasional foamy macrophages     MRI 4/21:  IMPRESSION:  1.   Redemonstrated cystic lesion centered within the left anterior carotid space insinuating between the internal carotid artery and internal jugular vein, measuring 1.6 x 1.4 x 2.1 cm. No significant enhancing component. Considerations include developmental cystic lesion such as lymphatic malformation or branchial cleft cyst. Necrotic lymph node or cystic schwannoma less likely. No enhancement to suggest paraganglioma.  2.  Unchanged nonspecific prominent left level II lymph node measuring 12 x 7 mm.  3.  Prominent left subpectoral lymph nodes as described on the previous soft tissue neck CT. These are nonspecific and could be reactive if recent left-sided vaccine administration. Otherwise, diagnostic mammography and axillary ultrasound would be helpful for further evaluation.    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 She has no symptoms related to this and no neck masses are felt/enlarged. Reassured her again. RTC 1 year.            Again, thank you for allowing me to participate in the care of your patient.        Sincerely,        Keisha Parekh MD

## 2021-09-15 NOTE — PROGRESS NOTES
HPI: This patient is a 69yo F who presents to clinic for follow-up of carotid space cystic mass. She has not noticed any symptoms with regards to her throat or neck other than bilateral trapezius tightness in the mornings when she wakes up. Denies fevers, unintentional weight loss, odynophagia, dysphagia, hemoptysis, voice changes, and shortness of breath. Nonsmoker, nondrinker.     Past medical history, surgical history, social history, family history, medications, and allergies have been reviewed with the patient and are documented above.     Review of Systems: a 10-system review was performed. Pertinent positives are noted in the HPI and on a separate scanned document in the chart.     PHYSICAL EXAMINATION:  GEN: no acute distress, normocephalic  EYES: extraocular movements are intact, pupils are equal and round. Sclera clear.   EARS: auricles are normally formed. The external auditory canals are clear with minimal to no cerumen. Tympanic membranes are intact bilaterally with no signs of infection, effusion, retractions, or perforations.  NOSE: anterior nares are patent. There are no masses or lesions. The septum is non-obstructing.  OC/OP: clear, dentition is in good repair. The tongue and palate are fully mobile and symmetric. S/p tonsillectomy in 1st grade. There is a smooth-walled and soft flap-like ~1cm outpouching of the left pharyngeal wall/tonsillar fossa, completely unchanged in 6mo. No BOT/FOM asymmetry or firmness.   NECK: soft and supple. Airway is midline. There is a fullness appreciated in the left level II neck that is deep and non-mobile.  NEURO: CN VII and XII symmetric. alert and oriented. No spontaneous nystagmus. Gait is normal.  PULM: breathing comfortably on room air, normal chest expansion with respiration  CARDS: no cyanosis or clubbing, normal carotid pulses     FLEXIBLE LARYNGOSCOPY: Scope inserted bilaterally to examine nasal tissue, nasopharynx, posterior oropharynx, hypopharynx, and  larynx. See exam notes for exam finding details. Patient tolerated the procedure well.     CT NECK 3/21: **no visible abnormality on the scan to indicate the etiology of the outpouching of the lateral pharyngeal wall on the left. The carotid space mass is largely cystic and not completely round. It can be seen interdigitating between the vasculature in the axial views, not compressing or invading, which is not a feature typical of a metastatic lymph node. This carotid space location is also atypical for metastatic lymphadenopathy.  IMPRESSION:   1.  Mixed cystic/solid lesion within or adjacent to the anterior left carotid space measuring 1.4 x 1.4 x 2.5 cm. This is indeterminate, though given the internal blood flow demonstrated on recent ultrasound, this may represent a necrotic lymph node. Alternatively, this could represent a second branchial cleft cyst, venolymphatic malformation, or other cystic lesion. Recommend further characterization with fine needle aspiration.  2.  Scattered, mildly prominent lymph nodes in bilateral cervical level II a, left greater than right, as well as in the left subpectoral sedation. These are also indeterminate, though the rounded morphology of the subpectoral nodes is concerning for   possible metastatic disease.     FNA 3/21:  LYMPH NODE, LEFT SIDE OF NECK, ULTRASOUND-GUIDED FINE NEEDLE ASPIRATION:     -  RARE LYMPHOID CELLS WITH MINIMAL CYTOLOGIC ATYPIA     -  ESSENTIALLY NON-DIAGNOSTIC SAMPLE     -  PLEASE SEE COMMENT-- show several lymphocytes and histiocytes with minimal cytologic atypia. Proteinaceous debris and blood are present in the background along with occasional foamy macrophages     MRI 4/21:  IMPRESSION:  1.  Redemonstrated cystic lesion centered within the left anterior carotid space insinuating between the internal carotid artery and internal jugular vein, measuring 1.6 x 1.4 x 2.1 cm. No significant enhancing component. Considerations include developmental cystic  lesion such as lymphatic malformation or branchial cleft cyst. Necrotic lymph node or cystic schwannoma less likely. No enhancement to suggest paraganglioma.  2.  Unchanged nonspecific prominent left level II lymph node measuring 12 x 7 mm.  3.  Prominent left subpectoral lymph nodes as described on the previous soft tissue neck CT. These are nonspecific and could be reactive if recent left-sided vaccine administration. Otherwise, diagnostic mammography and axillary ultrasound would be helpful for further evaluation.    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 She has no symptoms related to this and no neck masses are felt/enlarged. Reassured her again. RTC 1 year.

## 2021-10-11 ENCOUNTER — HEALTH MAINTENANCE LETTER (OUTPATIENT)
Age: 71
End: 2021-10-11

## 2021-10-11 ASSESSMENT — ACTIVITIES OF DAILY LIVING (ADL): CURRENT_FUNCTION: NO ASSISTANCE NEEDED

## 2021-10-12 ENCOUNTER — OFFICE VISIT (OUTPATIENT)
Dept: FAMILY MEDICINE | Facility: CLINIC | Age: 71
End: 2021-10-12
Payer: COMMERCIAL

## 2021-10-12 VITALS
WEIGHT: 163 LBS | HEIGHT: 63 IN | TEMPERATURE: 97.7 F | BODY MASS INDEX: 28.88 KG/M2 | SYSTOLIC BLOOD PRESSURE: 160 MMHG | DIASTOLIC BLOOD PRESSURE: 74 MMHG | HEART RATE: 84 BPM

## 2021-10-12 DIAGNOSIS — M65.4 DE QUERVAIN'S DISEASE (TENOSYNOVITIS): ICD-10-CM

## 2021-10-12 DIAGNOSIS — M85.88 OSTEOPENIA OF LUMBAR SPINE: ICD-10-CM

## 2021-10-12 DIAGNOSIS — Z13.220 LIPID SCREENING: ICD-10-CM

## 2021-10-12 DIAGNOSIS — Z00.00 ROUTINE GENERAL MEDICAL EXAMINATION AT A HEALTH CARE FACILITY: Primary | ICD-10-CM

## 2021-10-12 DIAGNOSIS — Z79.899 CURRENT USE OF ESTROGEN THERAPY: ICD-10-CM

## 2021-10-12 DIAGNOSIS — R22.1 NECK MASS: ICD-10-CM

## 2021-10-12 DIAGNOSIS — S76.311A HAMSTRING MUSCLE STRAIN, RIGHT, INITIAL ENCOUNTER: ICD-10-CM

## 2021-10-12 DIAGNOSIS — Z12.31 ENCOUNTER FOR SCREENING MAMMOGRAM FOR MALIGNANT NEOPLASM OF BREAST: ICD-10-CM

## 2021-10-12 DIAGNOSIS — I10 ESSENTIAL HYPERTENSION: ICD-10-CM

## 2021-10-12 PROBLEM — Z79.818 CURRENT USE OF ESTROGEN THERAPY: Status: ACTIVE | Noted: 2021-10-12

## 2021-10-12 LAB
ALBUMIN SERPL-MCNC: 4 G/DL (ref 3.5–5)
ALP SERPL-CCNC: 67 U/L (ref 45–120)
ALT SERPL W P-5'-P-CCNC: 12 U/L (ref 0–45)
ANION GAP SERPL CALCULATED.3IONS-SCNC: 7 MMOL/L (ref 5–18)
AST SERPL W P-5'-P-CCNC: 18 U/L (ref 0–40)
BILIRUB SERPL-MCNC: 0.8 MG/DL (ref 0–1)
BUN SERPL-MCNC: 18 MG/DL (ref 8–22)
CALCIUM SERPL-MCNC: 9.5 MG/DL (ref 8.5–10.5)
CHLORIDE BLD-SCNC: 104 MMOL/L (ref 98–107)
CHOLEST SERPL-MCNC: 211 MG/DL
CO2 SERPL-SCNC: 28 MMOL/L (ref 22–31)
CREAT SERPL-MCNC: 0.72 MG/DL (ref 0.6–1.1)
ERYTHROCYTE [DISTWIDTH] IN BLOOD BY AUTOMATED COUNT: 13.3 % (ref 10–15)
FASTING STATUS PATIENT QL REPORTED: YES
GFR SERPL CREATININE-BSD FRML MDRD: 86 ML/MIN/1.73M2
GLUCOSE BLD-MCNC: 108 MG/DL (ref 70–125)
HCT VFR BLD AUTO: 40.4 % (ref 35–47)
HDLC SERPL-MCNC: 57 MG/DL
HGB BLD-MCNC: 13.3 G/DL (ref 11.7–15.7)
LDLC SERPL CALC-MCNC: 130 MG/DL
MCH RBC QN AUTO: 30 PG (ref 26.5–33)
MCHC RBC AUTO-ENTMCNC: 32.9 G/DL (ref 31.5–36.5)
MCV RBC AUTO: 91 FL (ref 78–100)
PLATELET # BLD AUTO: 219 10E3/UL (ref 150–450)
POTASSIUM BLD-SCNC: 3.6 MMOL/L (ref 3.5–5)
PROT SERPL-MCNC: 7 G/DL (ref 6–8)
RBC # BLD AUTO: 4.44 10E6/UL (ref 3.8–5.2)
SODIUM SERPL-SCNC: 139 MMOL/L (ref 136–145)
TRIGL SERPL-MCNC: 120 MG/DL
WBC # BLD AUTO: 6.7 10E3/UL (ref 4–11)

## 2021-10-12 PROCEDURE — 99214 OFFICE O/P EST MOD 30 MIN: CPT | Mod: 25 | Performed by: FAMILY MEDICINE

## 2021-10-12 PROCEDURE — 99397 PER PM REEVAL EST PAT 65+ YR: CPT | Performed by: FAMILY MEDICINE

## 2021-10-12 PROCEDURE — 36415 COLL VENOUS BLD VENIPUNCTURE: CPT | Performed by: FAMILY MEDICINE

## 2021-10-12 PROCEDURE — 80061 LIPID PANEL: CPT | Performed by: FAMILY MEDICINE

## 2021-10-12 PROCEDURE — 80053 COMPREHEN METABOLIC PANEL: CPT | Performed by: FAMILY MEDICINE

## 2021-10-12 PROCEDURE — 85027 COMPLETE CBC AUTOMATED: CPT | Performed by: FAMILY MEDICINE

## 2021-10-12 RX ORDER — LOSARTAN POTASSIUM 100 MG/1
100 TABLET ORAL DAILY
Qty: 90 TABLET | Refills: 1 | Status: SHIPPED | OUTPATIENT
Start: 2021-10-12 | End: 2022-05-04

## 2021-10-12 ASSESSMENT — ACTIVITIES OF DAILY LIVING (ADL): CURRENT_FUNCTION: NO ASSISTANCE NEEDED

## 2021-10-12 ASSESSMENT — MIFFLIN-ST. JEOR: SCORE: 1234.52

## 2021-10-12 NOTE — PATIENT INSTRUCTIONS
When tapering estrogen, one approach is to decrease the estrogen by one pill per week every few weeks (ie, six pills per week for two to four weeks, then five pills per week for two to four weeks, etc) until the taper is completed.    Increase losartan to 100 mg daily. Please check blood pressure at home 1-2 times daily and send me a [employer] message in 2 weeks with these readings.     Preventive Health Recommendations    See your health care provider every year to    Review health changes.     Discuss preventive care.      Review your medicines if your doctor has prescribed any.      You no longer need a yearly Pap test unless you've had an abnormal Pap test in the past 10 years. If you have vaginal symptoms, such as bleeding or discharge, be sure to talk with your provider about a Pap test.      Every 1 to 2 years, have a mammogram.  If you are over 69, talk with your health care provider about whether or not you want to continue having screening mammograms.      Every 10 years, have a colonoscopy. Or, have a yearly FIT test (stool test). These exams will check for colon cancer.       Have a cholesterol test every 5 years, or more often if your doctor advises it.       Have a diabetes test (fasting glucose) every three years. If you are at risk for diabetes, you should have this test more often.       At age 65, have a bone density scan (DEXA) to check for osteoporosis (brittle bone disease).    Shots:    Get a flu shot each year.    Get a tetanus shot every 10 years.    Talk to your doctor about your pneumonia vaccines. There are now two you should receive - Pneumovax (PPSV 23) and Prevnar (PCV 13).    Talk to your pharmacist about the shingles vaccine.    Talk to your doctor about the hepatitis B vaccine.    Nutrition:     Eat at least 5 servings of fruits and vegetables each day.      Eat whole-grain bread, whole-wheat pasta and brown rice instead of white grains and rice.      Get adequate about Calcium and  Vitamin D.     Lifestyle    Exercise at least 150 minutes a week (30 minutes a day, 5 days a week). This will help you control your weight and prevent disease.      Limit alcohol to one drink per day.      No smoking.       Wear sunscreen to prevent skin cancer.       See your dentist twice a year for an exam and cleaning.      See your eye doctor every 1 to 2 years to screen for conditions such as glaucoma, macular degeneration, cataracts, etc.    Patient Education     De Quervain Tenosynovitis    De Quervain tenosynovitis is inflammation of tendons and synovium on the thumb side of the wrist. Tendons are fibers that attach muscle to bone. Synovium is a slick membrane that helps tendons move. Movements done over and over can irritate and inflame these tissues. This can cause pain when you touch or grasp objects, turn or twist your wrist, or make a fist. You may also have pain and swelling near the base of the thumb or numbness along the back of your thumb and index finger. You may also feel the thumb catch or snap when you move it.  Treatment will depend on how bad the symptoms are. It can often be treated with medicines, injections, splinting, and home care. If your case is severe, you may be referred to a specialist to talk about surgery.  Home care  Your healthcare provider may prescribe medicines to relieve pain and reduce inflammation. A steroid medicine may be injected near the tendons. This reduces swelling and pain. The healthcare provider may also suggest taking over-the-counter medicines such as ibuprofen or naproxen. These help reduce inflammation. Take all medicines only as directed.  The following are general care guidelines:    Avoid repetitive movements of your wrist and thumb.    Note any activity that causes pain or swelling. If possible, avoid or limit that activity.    Put a cold pack on your thumb. To make an ice pack, put ice cubes in a plastic bag that seals at the top. Wrap the bag in a  clean, thin towel or cloth. Hold this to your thumb for up to 20 minutes at a time. Don't put ice directly on the skin.    Your healthcare provider may put a splint on the thumb to hold it still. Use the splint as you have been instructed. In some cases, you may need to use a splint 24 hours a day for 4 to 6 weeks. This will allow the wrist and thumb to heal.  Follow-up care  Follow up with your healthcare provider, or as advised. You may need more treatment if your injury is severe or if your symptoms don't get better. This additional treatment may include local injections, physical therapy, and surgery.  When to seek medical advice  Call your healthcare provider right away if any of these occur:    Increase in pain or swelling    You have fever, chills, redness, warmth, or drainage    Symptoms get worse after taking medicine    Pain spreads farther down the thumb or into the forearm    Pain continues to get in the way of daily life  Oksana last reviewed this educational content on 6/1/2018 2000-2021 [employer]. All rights reserved. This information is not intended as a substitute for professional medical care. Always follow your healthcare professional's instructions.

## 2021-10-12 NOTE — PROGRESS NOTES
"SUBJECTIVE:   Yennifer Castro is a 70 year old female who presents for Preventive Visit.    Patient has been advised of split billing requirements and indicates understanding: Yes   Are you in the first 12 months of your Medicare coverage?  No    Healthy Habits:     In general, how would you rate your overall health?  Good    Frequency of exercise:  2-3 days/week    Duration of exercise:  15-30 minutes    Do you usually eat at least 4 servings of fruit and vegetables a day, include whole grains    & fiber and avoid regularly eating high fat or \"junk\" foods?  Yes    Taking medications regularly:  Yes    Medication side effects:  None    Ability to successfully perform activities of daily living:  No assistance needed    Home Safety:  No safety concerns identified    Hearing Impairment:  No hearing concerns    In the past 6 months, have you been bothered by leaking of urine?  No    In general, how would you rate your overall mental or emotional health?  Good      PHQ-2 Total Score: 0    Additional concerns today:  No    Bilateral thumb pain:  Ongoing, pain along radial aspect of the thumbs bilaterally and into wrist, left greater than right.  Point tenderness over base of left thumb as well.  Prior x-rays have shown osteoarthritis.  Feels pain is exacerbated with pressure changes in the weather.  Has been taking naproxen intermittently.  Night splints helped the following day the pain persists.    Right posterior leg pain:  Acute pain couple weeks ago after performing a cartwheel.  Pain has slowly improved but remains.  Patient is wondering how long the pain should persist.    Neck mass:  Last saw ENT in September was told to follow-up in 1 year.  Patient denies breathing or swallowing difficulties.  When she palpates the area her voice does change.  She does not believe it has grown in size.    Do you feel safe in your environment? Yes    Have you ever done Advance Care Planning? (For example, a Health Directive, " POLST, or a discussion with a medical provider or your loved ones about your wishes): No, advance care planning information given to patient to review.  Patient plans to discuss their wishes with loved ones or provider.      Fall risk  Fallen 2 or more times in the past year?: No  Any fall with injury in the past year?: No    Cognitive Screening   1) Repeat 3 items (Leader, Season, Table)    2) Clock draw: NORMAL  3) 3 item recall: Recalls 3 objects  Results: NORMAL clock, 1-2 items recalled: COGNITIVE IMPAIRMENT LESS LIKELY    Mini-CogTM Copyright S Naresh. Licensed by the author for use in [facility]; reprinted with permission (venkatesh@[facility]). All rights reserved.      Do you have sleep apnea, excessive snoring or daytime drowsiness?: no    Reviewed and updated as needed this visit by clinical staff  Tobacco  Allergies  Meds  Problems  Med Hx  Surg Hx  Fam Hx          Reviewed and updated as needed this visit by Provider  Tobacco  Allergies  Meds  Problems  Med Hx  Surg Hx  Fam Hx         Social History     Tobacco Use     Smoking status: Never Smoker     Smokeless tobacco: Never Used   Substance Use Topics     Alcohol use: No     If you drink alcohol do you typically have >3 drinks per day or >7 drinks per week? No    Alcohol Use 10/12/2021   Prescreen: >3 drinks/day or >7 drinks/week? -   Prescreen: >3 drinks/day or >7 drinks/week? No     Current providers sharing in care for this patient include:   Patient Care Team:  Jenni Valentin MD as PCP - General  Jenni Valentin MD as Assigned PCP  Los Humphrey DPM as Assigned Musculoskeletal Provider  Keisha Parekh MD as Assigned Surgical Provider    The following health maintenance items are reviewed in Epic and correct as of today:  Health Maintenance Due   Topic Date Due     FALL RISK ASSESSMENT  10/09/2021     Lab work is in process  Mammogram Screening: Mammogram Screening: Recommended mammography every 1-2  "years with patient discussion and risk factor consideration  Any new diagnosis of family breast, ovarian, or bowel cancer? No    FHS-7: No flowsheet data found.    Mammogram Screening: Recommended mammography every 1-2 years with patient discussion and risk factor consideration  Pertinent mammograms are reviewed under the imaging tab.    OBJECTIVE:   BP (!) 160/74   Pulse 84   Temp 97.7  F (36.5  C) (Oral)   Ht 1.594 m (5' 2.75\")   Wt 73.9 kg (163 lb)   BMI 29.10 kg/m   Estimated body mass index is 29.1 kg/m  as calculated from the following:    Height as of this encounter: 1.594 m (5' 2.75\").    Weight as of this encounter: 73.9 kg (163 lb).  Physical Exam  GENERAL APPEARANCE: healthy, alert and no distress  EYES: Eyes grossly normal to inspection, PERRL and conjunctivae and sclerae normal  HENT: ear canals and TM's normal, nose and mouth without ulcers or lesions, oropharynx clear and oral mucous membranes moist  NECK: Left anterior neck fullness and thyroid normal to palpation  RESP: lungs clear to auscultation - no rales, rhonchi or wheezes  CV: regular rate and rhythm, normal S1 S2, no S3 or S4, no murmur, click or rub, no peripheral edema and peripheral pulses strong  ABDOMEN: soft, nontender, no hepatosplenomegaly, no masses  MS: Tenderness to palpation along extensor pollicis brevis bilaterally worse on left, + Finkelstein, tenderness along right hamstring  SKIN: no suspicious lesions or rashes  NEURO: Normal strength and tone, sensory exam grossly normal, mentation intact and speech normal  PSYCH: mentation appears normal and affect normal/bright    ASSESSMENT / PLAN:   Yennifer was seen today for wellness visit.    Diagnoses and all orders for this visit:    Routine general medical examination at a health care facility  Reviewed and updated patient's past medical, surgical, family, social history, along with current medications and allergies.  Updated patient's health maintenance as further outlined " below.   COUNSELING:  Reviewed preventive health counseling, as reflected in patient instructions  Special attention given to:       Regular exercise       Healthy diet/nutrition       Dental care       Osteoporosis prevention/bone health       Colon cancer screening       (Marla)menopause management       Advanced Planning     Reviewed patients plan of care and provided an AVS. The Basic Care Plan (routine screening as documented in Health Maintenance) for Yennifer meets the Care Plan requirement. This Care Plan has been established and reviewed with the Patient.  -     Comprehensive metabolic panel (BMP + Alb, Alk Phos, ALT, AST, Total. Bili, TP)  -     CBC with platelets    Essential hypertension  Blood pressure remains elevated on recheck today, increase losartan to 100 mg daily.  Recommended taking this in the morning.  She will send a [employer] message in 2 weeks with home blood pressure readings.  -     losartan (COZAAR) 100 MG tablet; Take 1 tablet (100 mg) by mouth daily    Lipid screening  -     Lipid Profile (Chol, Trig, HDL, LDL calc)    Osteopenia of lumbar spine  Osteopenia on 10/2020 DEXA scan.  She receives dietary calcium and supplements with vitamin D, and walks for weightbearing exercise.  Plan to repeat a DEXA in 2023.    Encounter for screening mammogram for malignant neoplasm of breast  Continue with yearly screenings due to mother's history of breast cancer.  -     *MA Screening Digital Bilateral; Future    Current use of estrogen therapy  S/p total hysterectomy and oophorectomy in 1985 secondary to endometriosis.  Patient reluctant to taper her oral estrogen due to breakthrough hot flashes but discussed risks of long-term systemic estrogen use.  Plan to decrease estrogen by 1 pill/week every few weeks until the taper is completed.  Consider black cohosh for hot flash symptoms.    De Quervain's disease (tenosynovitis)  Bilateral tenosynovitis, worse along left thumb.  Recommended ice, topical  Voltaren gel, and OT which patient is agreeable.  If no improvement would have her see orthopedics for possible steroid injection.  -     Occupational Therapy Referral; Future    Hamstring muscle strain, right, initial encounter  Strain of right hamstring while performing a cartwheel.  Discussed NSAIDs and heat.  If not improving could consider physical therapy.    Neck mass  Continues to have left-sided anterior neck fullness, with imaging consistent with a left carotid space cystic mass.  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  Last saw Dr. Parekh in September with plan to follow-up in 1 year.      Sherlyn Tolentino DO  [facility]

## 2021-10-13 DIAGNOSIS — E78.2 MIXED HYPERLIPIDEMIA: Primary | ICD-10-CM

## 2021-10-13 RX ORDER — ATORVASTATIN CALCIUM 20 MG/1
20 TABLET, FILM COATED ORAL DAILY
Qty: 90 TABLET | Refills: 3 | Status: SHIPPED | OUTPATIENT
Start: 2021-10-13 | End: 2022-11-07 | Stop reason: SINTOL

## 2021-11-08 ENCOUNTER — HOSPITAL ENCOUNTER (OUTPATIENT)
Dept: OCCUPATIONAL THERAPY | Facility: REHABILITATION | Age: 71
End: 2021-11-08
Attending: FAMILY MEDICINE
Payer: COMMERCIAL

## 2021-11-08 DIAGNOSIS — M79.644 BILATERAL THUMB PAIN: Primary | ICD-10-CM

## 2021-11-08 DIAGNOSIS — Z78.9 DECREASED ACTIVITIES OF DAILY LIVING (ADL): ICD-10-CM

## 2021-11-08 DIAGNOSIS — M65.4 DE QUERVAIN'S DISEASE (TENOSYNOVITIS): ICD-10-CM

## 2021-11-08 DIAGNOSIS — M79.645 BILATERAL THUMB PAIN: Primary | ICD-10-CM

## 2021-11-08 DIAGNOSIS — R29.898 WEAKNESS OF BOTH HANDS: ICD-10-CM

## 2021-11-08 PROCEDURE — 97112 NEUROMUSCULAR REEDUCATION: CPT | Mod: GO | Performed by: OCCUPATIONAL THERAPIST

## 2021-11-08 PROCEDURE — 97165 OT EVAL LOW COMPLEX 30 MIN: CPT | Mod: GO | Performed by: OCCUPATIONAL THERAPIST

## 2021-11-08 PROCEDURE — 97140 MANUAL THERAPY 1/> REGIONS: CPT | Mod: GO | Performed by: OCCUPATIONAL THERAPIST

## 2021-11-08 NOTE — PROGRESS NOTES
[facility]          OUTPATIENT OCCUPATIONAL THERAPY  EVALUATION  PLAN OF TREATMENT FOR OUTPATIENT REHABILITATION  (COMPLETE FOR INITIAL CLAIMS ONLY)  Patient's Last Name, First Name, M.I.  YOB: 1950  Yennifer Castro                        Provider's Name  [facility] Medical Record No.  [id number]                               Onset Date:     11/01/20   Start of Care Date:     11/08/21   Type:     ___PT   _X_OT   ___SLP Medical Diagnosis:     bilateral thumb pain                          OT Diagnosis:     bilateral thumb pain and hand weakness, decreased ADL/IADL function Visits from SOC:  1   _________________________________________________________________________________  Plan of Treatment/Functional Goals:  Neuromuscular re-education,Manual therapy,Orthotic fitting/training,Self care/Home management,Strengthening     Goals  Goal Description: Patient will be able to perform meal prep with less thumb pain  Target Date: 02/06/22     Goal Description: Patient will be able to lift and carry for grocery shopping with less thumb pain  Target Date: 02/06/22     Goal Description: Patient will be able to perform grooming tasks with less pain  Target Date: 02/06/22    Therapy Frequency: once a week     Predicted Duration of Therapy Intervention (days/wks): 12 weeks  Letha Malcolm OT          I CERTIFY THE NEED FOR THESE SERVICES FURNISHED UNDER        THIS PLAN OF TREATMENT AND WHILE UNDER MY CARE     (Physician co-signature of this document indicates review and certification of the therapy plan).              Certification date from: 11/08/21, Certification date to: 02/06/22           Referring Physician: Dr. Sherlyn Tolentino     Initial Assessment        See Epic Evaluation      Start Of Care Date: 11/08/21 11/08/21 0800   Quick Adds   Quick Adds  Certification   Type of Visit Initial Outpatient Occupational Therapy Evaluation   General Information   Start Of Care Date 11/08/21   Referring Physician Dr. Sherlyn Tolentino   Orders Evaluate and treat as indicated   Orders Date 10/12/21   Medical Diagnosis bilateral thumb pain   Onset of Illness/Injury or Date of Surgery 11/01/20   Precautions/Limitations No known precautions/limitations   Surgical/Medical History Reviewed Yes   Additional Occupational Profile Info/Pertinent History of Current Problem Patient presents with bilateral thumb pain. Symptoms began about 3 years ago in the left thumb and currently she reports pain in both thumbs with increased intensity about a year ago. Patient denies numbness and tingling. She reports that it is causing increased difficulty with daily function.   Pain   Patient currently in pain Yes   Pain location bilateral MP joints of thumbs   Pain rating 5/10 rest and 10/10 activity   Fall Risk Screen   Fall screen completed by OT   Have you fallen 2 or more times in the past year? No   Have you fallen and had an injury in the past year? No   Is patient a fall risk? No   Abuse Screen (yes response referral indicated)   Feels Unsafe at Home or Work/School no   Feels Threatened by Someone no   Does Anyone Try to Keep You From Having Contact with Others or Doing Things Outside Your Home? no   Physical Signs of Abuse Present no   Sensation   Sensation Comments Patient reports normal sensation    Range of Motion (ROM)   ROM Comments Bilateral UE AROM is WNL   Strength   Strength Comments general UE strength is WFL   Hand Strength   Hand Dominance Right   Left Hand  (pounds) 27 pounds   Right Hand  (pounds) 38 pounds   Left Lateral Pinch (pounds) 3 pounds   Right Lateral Pinch (pounds) 7 pounds   Left Three Point Pinch (pounds) 5 pounds   Right Three Point Pinch (pounds) 7 pounds   Planned Therapy Interventions   Planned Therapy Interventions Neuromuscular re-education;Manual  therapy;Orthotic fitting/training;Self care/Home management;Strengthening    OT Goal 1   Goal Description Patient will be able to perform meal prep with less thumb pain   Target Date 02/06/22    OT Goal 2   Goal Description Patient will be able to lift and carry for grocery shopping with less thumb pain   Target Date 02/06/22    OT Goal 3   Goal Description Patient will be able to perform grooming tasks with less pain   Target Date 02/06/22   Clinical Impression   Criteria for Skilled Therapeutic Interventions Met Yes, treatment indicated   OT Diagnosis bilateral thumb pain and hand weakness, decreased ADL/IADL function   Clinical Decision Making (Complexity) Low complexity   Therapy Frequency once a week   Predicted Duration of Therapy Intervention (days/wks) 12 weeks   Risks and Benefits of Treatment have been explained. Yes   Patient, Family & other staff in agreement with plan of care Yes   Clinical Impression Comments Patient has IP pain likely related to arthritis and DeQuervains as well.    Education Assessment   Barriers To Learning No Barriers   Therapy Certification   Certification date from 11/08/21   Certification date to 02/06/22   Certification I certify the need for these services furnished under this plan of treatment and while under my care.  (Physician co-signature of this document indicates review and certification of the therapy plan)   Total Evaluation Time   OT Eval, Low Complexity Minutes (68649) 20

## 2021-11-29 ENCOUNTER — HOSPITAL ENCOUNTER (OUTPATIENT)
Dept: OCCUPATIONAL THERAPY | Facility: REHABILITATION | Age: 71
End: 2021-11-29
Payer: COMMERCIAL

## 2021-11-29 DIAGNOSIS — M79.645 BILATERAL THUMB PAIN: ICD-10-CM

## 2021-11-29 DIAGNOSIS — Z78.9 DECREASED ACTIVITIES OF DAILY LIVING (ADL): ICD-10-CM

## 2021-11-29 DIAGNOSIS — R29.898 WEAKNESS OF BOTH HANDS: ICD-10-CM

## 2021-11-29 DIAGNOSIS — M65.4 DE QUERVAIN'S DISEASE (TENOSYNOVITIS): Primary | ICD-10-CM

## 2021-11-29 DIAGNOSIS — M79.644 BILATERAL THUMB PAIN: ICD-10-CM

## 2021-11-29 PROCEDURE — 97760 ORTHOTIC MGMT&TRAING 1ST ENC: CPT | Mod: GO | Performed by: OCCUPATIONAL THERAPIST

## 2021-11-29 PROCEDURE — 97110 THERAPEUTIC EXERCISES: CPT | Mod: GO | Performed by: OCCUPATIONAL THERAPIST

## 2021-11-30 ENCOUNTER — ANCILLARY PROCEDURE (OUTPATIENT)
Dept: MAMMOGRAPHY | Facility: CLINIC | Age: 71
End: 2021-11-30
Attending: FAMILY MEDICINE
Payer: COMMERCIAL

## 2021-11-30 DIAGNOSIS — R23.2 HOT FLASHES: ICD-10-CM

## 2021-11-30 DIAGNOSIS — Z12.31 ENCOUNTER FOR SCREENING MAMMOGRAM FOR MALIGNANT NEOPLASM OF BREAST: ICD-10-CM

## 2021-11-30 PROCEDURE — 77067 SCR MAMMO BI INCL CAD: CPT | Mod: TC | Performed by: RADIOLOGY

## 2021-12-02 NOTE — TELEPHONE ENCOUNTER
"Routing refill request to provider for review/approval because:  Labs out of range:  Blood pressure    Last Written Prescription Date:  10/9/20  Last Fill Quantity: 45,  # refills: 11   Last office visit provider:     Sherlyn Tolentino DO            Requested Prescriptions   Pending Prescriptions Disp Refills     estradiol (ESTRACE) 0.5 MG tablet [Pharmacy Med Name: Estradiol 0.5 MG Oral Tablet] 45 tablet 0     Sig: Take 1/2 (one-half) tablet by mouth once daily       Hormone Replacement Therapy Failed - 11/30/2021  2:34 PM        Failed - Blood pressure under 140/90 in past 12 months     BP Readings from Last 3 Encounters:   10/12/21 (!) 160/74   03/04/21 (!) 159/82   02/12/21 (!) 185/119                 Passed - Recent (12 mo) or future (30 days) visit within the authorizing provider's specialty     Patient has had an office visit with the authorizing provider or a provider within the authorizing providers department within the previous 12 mos or has a future within next 30 days. See \"Patient Info\" tab in inbasket, or \"Choose Columns\" in Meds & Orders section of the refill encounter.              Passed - Patient has mammogram in past 2 years on file if age 50-75        Passed - Medication is active on med list        Passed - Patient is 18 years of age or older        Passed - No active pregnancy on record        Passed - No positive pregnancy test on record in past 12 months             Diana Wright [facility] 12/02/21 10:17 AM    "

## 2021-12-02 NOTE — TELEPHONE ENCOUNTER
Patient calling with question about starting on statin meds.  She wants to know if its bad to take aspirin everyday?  Is It ok to take both at the same time, every day?  Why did doctor decide this year to start the statins when numbers are lower this year than last year?    Please call patient  Ok to leave detailed message on home and cell

## 2021-12-03 RX ORDER — ESTRADIOL 0.5 MG/1
TABLET ORAL
Qty: 45 TABLET | Refills: 0 | Status: SHIPPED | OUTPATIENT
Start: 2021-12-03 | End: 2022-03-21

## 2021-12-03 NOTE — TELEPHONE ENCOUNTER
Spoke to pt and relayed MD message. She will start statin and aspirin. For the estradiol she said she did drop to taking it only 6 days a week for the last couple weeks and will continue to taper but is currently out of medication. Pt is asking if this can be filled today

## 2022-01-25 ENCOUNTER — TRANSFERRED RECORDS (OUTPATIENT)
Dept: HEALTH INFORMATION MANAGEMENT | Facility: CLINIC | Age: 72
End: 2022-01-25
Payer: COMMERCIAL

## 2022-03-21 DIAGNOSIS — R23.2 HOT FLASHES: ICD-10-CM

## 2022-03-22 NOTE — TELEPHONE ENCOUNTER
"Routing refill request to provider for review/approval because:  Labs out of range:  BP elevated, per last refill request patient was to taper medication, unsure if PCP wants patient on this or not. Please advise.     Last Written Prescription Date:  12-3-21  Last Fill Quantity: 45,  # refills: 0   Last office visit provider:   10/12/21    Requested Prescriptions   Pending Prescriptions Disp Refills     estradiol (ESTRACE) 0.5 MG tablet 135 tablet 3     Sig: Take 1/2 (one-half) tablet by mouth once daily       Hormone Replacement Therapy Failed - 3/21/2022  6:17 PM        Failed - Blood pressure under 140/90 in past 12 months     BP Readings from Last 3 Encounters:   10/12/21 (!) 160/74   03/04/21 (!) 159/82   02/12/21 (!) 185/119                 Passed - Recent (12 mo) or future (30 days) visit within the authorizing provider's specialty     Patient has had an office visit with the authorizing provider or a provider within the authorizing providers department within the previous 12 mos or has a future within next 30 days. See \"Patient Info\" tab in inbasket, or \"Choose Columns\" in Meds & Orders section of the refill encounter.              Passed - Patient has mammogram in past 2 years on file if age 50-75        Passed - Medication is active on med list        Passed - Patient is 18 years of age or older        Passed - No active pregnancy on record        Passed - No positive pregnancy test on record in past 12 months             Shila Allen RN 03/22/22 3:31 PM  "

## 2022-03-23 RX ORDER — ESTRADIOL 0.5 MG/1
TABLET ORAL
Qty: 135 TABLET | Refills: 3 | Status: SHIPPED | OUTPATIENT
Start: 2022-03-23 | End: 2022-11-07

## 2022-05-20 ENCOUNTER — LAB (OUTPATIENT)
Dept: LAB | Facility: CLINIC | Age: 72
End: 2022-05-20
Payer: COMMERCIAL

## 2022-05-20 ENCOUNTER — ALLIED HEALTH/NURSE VISIT (OUTPATIENT)
Dept: FAMILY MEDICINE | Facility: CLINIC | Age: 72
End: 2022-05-20

## 2022-05-20 VITALS — DIASTOLIC BLOOD PRESSURE: 69 MMHG | HEART RATE: 86 BPM | SYSTOLIC BLOOD PRESSURE: 128 MMHG

## 2022-05-20 DIAGNOSIS — I10 ESSENTIAL HYPERTENSION: ICD-10-CM

## 2022-05-20 DIAGNOSIS — I10 ESSENTIAL HYPERTENSION: Primary | ICD-10-CM

## 2022-05-20 LAB
ANION GAP SERPL CALCULATED.3IONS-SCNC: 9 MMOL/L (ref 5–18)
BUN SERPL-MCNC: 24 MG/DL (ref 8–22)
CALCIUM SERPL-MCNC: 9.8 MG/DL (ref 8.5–10.5)
CHLORIDE BLD-SCNC: 101 MMOL/L (ref 98–107)
CO2 SERPL-SCNC: 28 MMOL/L (ref 22–31)
CREAT SERPL-MCNC: 0.84 MG/DL (ref 0.6–1.1)
GFR SERPL CREATININE-BSD FRML MDRD: 75 ML/MIN/1.73M2
GLUCOSE BLD-MCNC: 116 MG/DL (ref 70–125)
POTASSIUM BLD-SCNC: 4.1 MMOL/L (ref 3.5–5)
SODIUM SERPL-SCNC: 138 MMOL/L (ref 136–145)

## 2022-05-20 PROCEDURE — 80048 BASIC METABOLIC PNL TOTAL CA: CPT

## 2022-05-20 PROCEDURE — 36415 COLL VENOUS BLD VENIPUNCTURE: CPT

## 2022-05-20 PROCEDURE — 99207 PR NO CHARGE LOS: CPT | Mod: 25

## 2022-05-20 NOTE — PROGRESS NOTES
Follow Up Blood Pressure Check    Yennifer Castro is a 71 year old female recommended to follow up for blood pressure check by Jenni Valentin. Anihypertensive medications and adherence were verified: Yes.     Reason for visit: Follow up    Medication change at last visit: yes added hydrochlorothiazide     Today's Vitals:   Vitals:    05/20/22 1029   BP: 128/69   Pulse: 86       Home blood pressure readings brought in today:   Brought in home cuff. Reading on home cuff was 118/74    Lowest blood pressure today is less than 140/90 and they deny signs or symptoms of new onset: .  Please inform patient of his/her blood pressure today.  If they are asymptomatic, the patient is to continue current medications.  This message will be routed to their provider, and they will be notified if a change in medication is recommended.    ( may be deleted if not applicable) If lowest blood pressure is greater than 200/110, regardless if symptoms are present, patient needs to be evaluated by a provider today.    Kika Little    Current Outpatient Medications   Medication Sig Dispense Refill     atorvastatin (LIPITOR) 20 MG tablet Take 1 tablet (20 mg) by mouth daily 90 tablet 3     betamethasone valerate 0.12 % foam [BETAMETHASONE VALERATE 0.12 % FOAM] Apply foam to scalp and behind ear once to twice daily as needed for flare ups up to 2 weeks at a time 50 g 2     cholecalciferol, vitamin D3, (VITAMIN D3) 2,000 unit cap [CHOLECALCIFEROL, VITAMIN D3, (VITAMIN D3) 2,000 UNIT CAP] Take 1 capsule by mouth daily.       estradiol (ESTRACE) 0.5 MG tablet Take 1/2 (one-half) tablet by mouth once daily 135 tablet 3     losartan-hydrochlorothiazide (HYZAAR) 100-12.5 MG tablet Take 1 tablet by mouth daily 90 tablet 0     MULTIVITAMIN WITH MINERALS/LUT (MULTIVITAMIN 50 PLUS ORAL) [MULTIVITAMIN WITH MINERALS/LUT (MULTIVITAMIN 50 PLUS ORAL)] Take 1 tablet by mouth daily.       naproxen (NAPROSYN) 500 MG tablet [NAPROXEN (NAPROSYN) 500 MG  TABLET] Take 1 tablet (500 mg total) by mouth 2 (two) times a day with meals. Prn for pain 60 tablet 2     vitamin E 400 unit capsule [VITAMIN E 400 UNIT CAPSULE] Take 400 Units by mouth daily.

## 2022-08-01 DIAGNOSIS — I10 ESSENTIAL HYPERTENSION: ICD-10-CM

## 2022-08-01 RX ORDER — LOSARTAN POTASSIUM AND HYDROCHLOROTHIAZIDE 12.5; 1 MG/1; MG/1
TABLET ORAL
Qty: 90 TABLET | Refills: 1 | Status: SHIPPED | OUTPATIENT
Start: 2022-08-01 | End: 2022-11-07

## 2022-09-13 ENCOUNTER — TELEPHONE (OUTPATIENT)
Dept: FAMILY MEDICINE | Facility: CLINIC | Age: 72
End: 2022-09-13

## 2022-09-13 NOTE — TELEPHONE ENCOUNTER
Notification from [employer] for     estradiol (ESTRACE) 0.5 MG tablet 135 tablet 3 3/23/2022  No   Sig: Take 1/2 (one-half) tablet by mouth once daily   Sent to pharmacy as: Estradiol 0.5 MG Oral Tablet (ESTRACE)     CoverMyMeds  Key [id number]

## 2022-09-14 NOTE — TELEPHONE ENCOUNTER
Central Prior Authorization Team   Phone: [phone]    PA Initiation    Medication: Estradiol   Insurance Company: [employer] - Phone [phone] Fax [phone]  Pharmacy Filling the Rx: [facility] [address]  Filling Pharmacy Phone: [phone]  Filling Pharmacy Fax:    Start Date: 9/14/2022

## 2022-09-15 NOTE — TELEPHONE ENCOUNTER
Prior Authorization Approval    Authorization Effective Date: 8/15/2022  Authorization Expiration Date: 9/14/2023  Medication: Estradiol   Approved Dose/Quantity:    Reference #:     Insurance Company: [employer] - Phone [phone] Fax [phone]  Expected CoPay:       CoPay Card Available:      Foundation Assistance Needed:    Which Pharmacy is filling the prescription (Not needed for infusion/clinic administered): [facility] [address] [place] [address]  Pharmacy Notified: Yes  Patient Notified: Yes

## 2022-09-24 ENCOUNTER — HEALTH MAINTENANCE LETTER (OUTPATIENT)
Age: 72
End: 2022-09-24

## 2022-09-27 ENCOUNTER — NURSE TRIAGE (OUTPATIENT)
Dept: NURSING | Facility: CLINIC | Age: 72
End: 2022-09-27

## 2022-09-27 NOTE — TELEPHONE ENCOUNTER
Triage call    Patient calling to report she has had a cough for over a week.  She is bringing up green sputum her cough is very forceful and he throat is sore.  She has nasal congestion.  She has done 2 home tests for covid and tested negative both times.  She did have a fever but that has broken.    Per protocol see PCP in 24 hours.  Care advice given.  Verbalizes understanding and agrees with plan.  Transferred to scheduling.    Purvi Baca RN   [facility] Nurse Advisor  10:39 AM 9/27/2022    COVID 19 Nurse Triage Plan/Patient Instructions    Please be aware that novel coronavirus (COVID-19) may be circulating in the community. If you develop symptoms such as fever, cough, or SOB or if you have concerns about the presence of another infection including coronavirus (COVID-19), please contact your health care provider or visit [email or web address].     Disposition/Instructions    In-Person Visit with provider recommended. Reference Visit Selection Guide.    Thank you for taking steps to prevent the spread of this virus.  o Limit your contact with others.  o Wear a simple mask to cover your cough.  o Wash your hands well and often.    Resources    [facility]: About COVID-19: [email or web address]    CDC: What to Do If You're Sick: www.cdc.gov/coronavirus/2019-ncov/about/steps-when-sick.html    CDC: Ending Home Isolation: www.cdc.gov/coronavirus/2019-ncov/hcp/disposition-in-home-patients.html     CDC: Caring for Someone: www.cdc.gov/coronavirus/2019-ncov/if-you-are-sick/care-for-someone.html     [facility]: Interim Guidance for Hospital Discharge to Home: www.health.[facility].[place]/diseases/coronavirus/hcp/hospdischarge.pdf    [facility] clinical trials (COVID-19 research studies): [email or web address]     Below are the COVID-19 hotlines at the [place] Department of Health ([facility]). Interpreters are available.   o For health questions: Call [phone]  or 1-620.398.3948 (7 a.m. to 7 p.m.)  o For questions about schools and childcare: Call [phone] or 1-521.359.4238 (7 a.m. to 7 p.m.)     Reason for Disposition    SEVERE coughing spells (e.g., whooping sound after coughing, vomiting after coughing)    Additional Information    Negative: SEVERE difficulty breathing (e.g., struggling for each breath, speaks in single words)    Negative: Bluish (or gray) lips or face now    Negative: [1] Difficulty breathing AND [2] exposure to flames, smoke, or fumes    Negative: [1] Stridor AND [2] difficulty breathing    Negative: Sounds like a life-threatening emergency to the triager    Negative: [1] Previous asthma attacks AND [2] this feels like asthma attack    Negative: Dry cough (non-productive;  no sputum or minimal clear sputum)    Negative: [1] MODERATE difficulty breathing (e.g., speaks in phrases, SOB even at rest, pulse 100-120) AND [2] still present when not coughing    Negative: Chest pain  (Exception: MILD central chest pain, present only when coughing)    Negative: Patient sounds very sick or weak to the triager    Negative: [1] MILD difficulty breathing (e.g., minimal/no SOB at rest, SOB with walking, pulse <100) AND [2] still present when not coughing    Negative: [1] Coughed up blood AND [2] > 1 tablespoon (15 ml)  (Exception: Blood-tinged sputum.)    Negative: Fever > 103 F (39.4 C)    Negative: [1] Fever > 101 F (38.3 C) AND [2] age > 60 years    Negative: [1] Fever > 100.0 F (37.8 C) AND [2] bedridden (e.g., nursing home patient, CVA, chronic illness, recovering from surgery)    Negative: [1] Fever > 100.0 F (37.8 C) AND [2] diabetes mellitus or weak immune system (e.g., HIV positive, cancer chemo, splenectomy, organ transplant, chronic steroids)    Negative: Wheezing is present    Protocols used: COUGH - ACUTE [id number]

## 2022-11-06 ASSESSMENT — ENCOUNTER SYMPTOMS
PARESTHESIAS: 0
WEAKNESS: 0
ABDOMINAL PAIN: 0
SORE THROAT: 0
SHORTNESS OF BREATH: 0
MYALGIAS: 0
ARTHRALGIAS: 0
NAUSEA: 0
HEMATURIA: 0
JOINT SWELLING: 0
FEVER: 0
COUGH: 0
DIARRHEA: 0
DYSURIA: 0
CHILLS: 0
HEMATOCHEZIA: 0
BREAST MASS: 0
PALPITATIONS: 0
EYE PAIN: 0
FREQUENCY: 0
HEADACHES: 0
NERVOUS/ANXIOUS: 0
DIZZINESS: 0
CONSTIPATION: 0
HEARTBURN: 0

## 2022-11-06 ASSESSMENT — ACTIVITIES OF DAILY LIVING (ADL): CURRENT_FUNCTION: NO ASSISTANCE NEEDED

## 2022-11-07 ENCOUNTER — OFFICE VISIT (OUTPATIENT)
Dept: FAMILY MEDICINE | Facility: CLINIC | Age: 72
End: 2022-11-07
Payer: COMMERCIAL

## 2022-11-07 ENCOUNTER — TELEPHONE (OUTPATIENT)
Dept: FAMILY MEDICINE | Facility: CLINIC | Age: 72
End: 2022-11-07

## 2022-11-07 DIAGNOSIS — M79.671 HEEL PAIN, BILATERAL: ICD-10-CM

## 2022-11-07 DIAGNOSIS — R93.89 ABNORMAL FINDINGS ON DIAGNOSTIC IMAGING OF OTHER SPECIFIED BODY STRUCTURES: ICD-10-CM

## 2022-11-07 DIAGNOSIS — R59.0 LEFT CERVICAL LYMPHADENOPATHY: ICD-10-CM

## 2022-11-07 DIAGNOSIS — G89.29 CHRONIC PAIN OF LEFT THUMB: ICD-10-CM

## 2022-11-07 DIAGNOSIS — Z13.220 LIPID SCREENING: ICD-10-CM

## 2022-11-07 DIAGNOSIS — Z79.899 CURRENT USE OF ESTROGEN THERAPY: ICD-10-CM

## 2022-11-07 DIAGNOSIS — D49.89: ICD-10-CM

## 2022-11-07 DIAGNOSIS — M79.672 HEEL PAIN, BILATERAL: ICD-10-CM

## 2022-11-07 DIAGNOSIS — R73.01 ELEVATED FASTING GLUCOSE: ICD-10-CM

## 2022-11-07 DIAGNOSIS — M19.042 ARTHRITIS OF BOTH HANDS: ICD-10-CM

## 2022-11-07 DIAGNOSIS — M79.645 CHRONIC PAIN OF LEFT THUMB: ICD-10-CM

## 2022-11-07 DIAGNOSIS — L30.9 ECZEMA OF SCALP: ICD-10-CM

## 2022-11-07 DIAGNOSIS — M19.041 ARTHRITIS OF BOTH HANDS: ICD-10-CM

## 2022-11-07 DIAGNOSIS — R05.9 COUGH, UNSPECIFIED TYPE: ICD-10-CM

## 2022-11-07 DIAGNOSIS — R22.1 NECK MASS: ICD-10-CM

## 2022-11-07 DIAGNOSIS — M25.50 POLYARTHRALGIA: ICD-10-CM

## 2022-11-07 DIAGNOSIS — I10 ESSENTIAL HYPERTENSION: ICD-10-CM

## 2022-11-07 DIAGNOSIS — Z00.00 ENCOUNTER FOR MEDICARE ANNUAL WELLNESS EXAM: Primary | ICD-10-CM

## 2022-11-07 DIAGNOSIS — R23.2 HOT FLASHES: ICD-10-CM

## 2022-11-07 LAB
ALBUMIN SERPL BCG-MCNC: 4.4 G/DL (ref 3.5–5.2)
ALP SERPL-CCNC: 66 U/L (ref 35–104)
ALT SERPL W P-5'-P-CCNC: 15 U/L (ref 10–35)
ANION GAP SERPL CALCULATED.3IONS-SCNC: 11 MMOL/L (ref 7–15)
AST SERPL W P-5'-P-CCNC: 21 U/L (ref 10–35)
BILIRUB SERPL-MCNC: 0.4 MG/DL
BUN SERPL-MCNC: 19.6 MG/DL (ref 8–23)
CALCIUM SERPL-MCNC: 9.3 MG/DL (ref 8.8–10.2)
CHLORIDE SERPL-SCNC: 102 MMOL/L (ref 98–107)
CHOLEST SERPL-MCNC: 235 MG/DL
CREAT SERPL-MCNC: 0.82 MG/DL (ref 0.51–0.95)
CRP SERPL-MCNC: 9.31 MG/L
DEPRECATED HCO3 PLAS-SCNC: 27 MMOL/L (ref 22–29)
ERYTHROCYTE [DISTWIDTH] IN BLOOD BY AUTOMATED COUNT: 13 % (ref 10–15)
ERYTHROCYTE [SEDIMENTATION RATE] IN BLOOD BY WESTERGREN METHOD: 29 MM/HR (ref 0–20)
GFR SERPL CREATININE-BSD FRML MDRD: 77 ML/MIN/1.73M2
GLUCOSE SERPL-MCNC: 109 MG/DL (ref 70–99)
HCT VFR BLD AUTO: 39.3 % (ref 35–47)
HDLC SERPL-MCNC: 48 MG/DL
HGB BLD-MCNC: 13.1 G/DL (ref 11.7–15.7)
LDLC SERPL CALC-MCNC: 154 MG/DL
MCH RBC QN AUTO: 30.1 PG (ref 26.5–33)
MCHC RBC AUTO-ENTMCNC: 33.3 G/DL (ref 31.5–36.5)
MCV RBC AUTO: 90 FL (ref 78–100)
NONHDLC SERPL-MCNC: 187 MG/DL
PLATELET # BLD AUTO: 233 10E3/UL (ref 150–450)
POTASSIUM SERPL-SCNC: 3.8 MMOL/L (ref 3.4–5.3)
PROT SERPL-MCNC: 7.5 G/DL (ref 6.4–8.3)
RBC # BLD AUTO: 4.35 10E6/UL (ref 3.8–5.2)
SODIUM SERPL-SCNC: 140 MMOL/L (ref 136–145)
TRIGL SERPL-MCNC: 167 MG/DL
TSH SERPL DL<=0.005 MIU/L-ACNC: 3.48 UIU/ML (ref 0.3–4.2)
WBC # BLD AUTO: 5.5 10E3/UL (ref 4–11)

## 2022-11-07 PROCEDURE — 84155 ASSAY OF PROTEIN SERUM: CPT | Mod: 59 | Performed by: FAMILY MEDICINE

## 2022-11-07 PROCEDURE — 36415 COLL VENOUS BLD VENIPUNCTURE: CPT | Performed by: FAMILY MEDICINE

## 2022-11-07 PROCEDURE — 84443 ASSAY THYROID STIM HORMONE: CPT | Performed by: FAMILY MEDICINE

## 2022-11-07 PROCEDURE — 85652 RBC SED RATE AUTOMATED: CPT | Performed by: FAMILY MEDICINE

## 2022-11-07 PROCEDURE — 80053 COMPREHEN METABOLIC PANEL: CPT | Performed by: FAMILY MEDICINE

## 2022-11-07 PROCEDURE — 85027 COMPLETE CBC AUTOMATED: CPT | Performed by: FAMILY MEDICINE

## 2022-11-07 PROCEDURE — 99214 OFFICE O/P EST MOD 30 MIN: CPT | Mod: 25 | Performed by: FAMILY MEDICINE

## 2022-11-07 PROCEDURE — 80061 LIPID PANEL: CPT | Performed by: FAMILY MEDICINE

## 2022-11-07 PROCEDURE — 83036 HEMOGLOBIN GLYCOSYLATED A1C: CPT | Performed by: FAMILY MEDICINE

## 2022-11-07 PROCEDURE — G0438 PPPS, INITIAL VISIT: HCPCS | Performed by: FAMILY MEDICINE

## 2022-11-07 PROCEDURE — 86140 C-REACTIVE PROTEIN: CPT | Performed by: FAMILY MEDICINE

## 2022-11-07 PROCEDURE — 84165 PROTEIN E-PHORESIS SERUM: CPT

## 2022-11-07 RX ORDER — LOSARTAN POTASSIUM AND HYDROCHLOROTHIAZIDE 12.5; 1 MG/1; MG/1
1 TABLET ORAL DAILY
Qty: 90 TABLET | Refills: 2 | Status: SHIPPED | OUTPATIENT
Start: 2022-11-07 | End: 2023-10-23

## 2022-11-07 RX ORDER — ALBUTEROL SULFATE 90 UG/1
2 AEROSOL, METERED RESPIRATORY (INHALATION) EVERY 6 HOURS
Qty: 18 G | Refills: 1 | Status: SHIPPED | OUTPATIENT
Start: 2022-11-07 | End: 2024-02-14

## 2022-11-07 RX ORDER — ESTRADIOL 0.5 MG/1
TABLET ORAL
Qty: 135 TABLET | Refills: 3 | Status: SHIPPED | OUTPATIENT
Start: 2022-11-07 | End: 2023-11-10

## 2022-11-07 RX ORDER — BETAMETHASONE VALERATE 1.2 MG/G
AEROSOL, FOAM TOPICAL
Qty: 50 G | Refills: 2 | Status: SHIPPED | OUTPATIENT
Start: 2022-11-07 | End: 2022-11-16

## 2022-11-07 ASSESSMENT — ACTIVITIES OF DAILY LIVING (ADL): CURRENT_FUNCTION: NO ASSISTANCE NEEDED

## 2022-11-07 ASSESSMENT — ENCOUNTER SYMPTOMS
MYALGIAS: 0
SORE THROAT: 0
ABDOMINAL PAIN: 0
HEADACHES: 0
DIZZINESS: 0
PALPITATIONS: 0
HEMATOCHEZIA: 0
DIARRHEA: 0
NAUSEA: 0
CHILLS: 0
HEARTBURN: 0
HEMATURIA: 0
COUGH: 0
JOINT SWELLING: 0
WEAKNESS: 0
DYSURIA: 0
EYE PAIN: 0
BREAST MASS: 0
SHORTNESS OF BREATH: 0
NERVOUS/ANXIOUS: 0
FREQUENCY: 0
FEVER: 0
ARTHRALGIAS: 0
CONSTIPATION: 0
PARESTHESIAS: 0

## 2022-11-07 NOTE — PATIENT INSTRUCTIONS
Let me know if interested in PT referral for full body alignment and I can place referral -   [facility]  [email or web address]  [address], [place] [address]      [phone]    Could also  consider hand PT for massage.     Referral placed for ortho.     Update me on blood pressure        Patient Education   Personalized Prevention Plan  You are due for the preventive services outlined below.  Your care team is available to assist you in scheduling these services.  If you have already completed any of these items, please share that information with your care team to update in your medical record.  Health Maintenance Due   Topic Date Due    Hepatitis B Vaccine (1 of 3 - 3-dose series) Never done    ANNUAL REVIEW OF HM ORDERS  10/12/2022    Annual Wellness Visit  10/12/2022

## 2022-11-07 NOTE — PROGRESS NOTES
"SUBJECTIVE:   Ysabel is a 71 year old who presents for Preventive Visit.       Patient has been advised of split billing requirements and indicates understanding: Yes  Are you in the first 12 months of your Medicare coverage?  No    Healthy Habits:     In general, how would you rate your overall health?  Good    Frequency of exercise:  2-3 days/week    Duration of exercise:  15-30 minutes    Do you usually eat at least 4 servings of fruit and vegetables a day, include whole grains    & fiber and avoid regularly eating high fat or \"junk\" foods?  Yes    Ability to successfully perform activities of daily living:  No assistance needed    Home Safety:  No safety concerns identified    Hearing Impairment:  No hearing concerns    In the past 6 months, have you been bothered by leaking of urine?  No    In general, how would you rate your overall mental or emotional health?  Excellent      PHQ-2 Total Score: 0  Both thumbs always hurt. Now full hands very painful. Was left  Thumb that was first then right one and then there are times where hurts and doesn't have to do anything to aggravate the pain   Also left heel bothering. Taking naproxen once a night. Even if does seems better in morning.   Doesn walk barefoot. Hx of bone spur in right foot , walks different since. Now knees and back hurt.   Wears crocks at home.   Wearing over the counter inserts.   wondering about vitamins. -was taking vitamin E. Now taking multivitamin and D2 or D3.     Doesn't take estradiol on mondays and takes 1/2 pill otherwise. If forgets to take one day, pays for it. Feels it is helping her   Last 2 weeks of September  was sick. And both tested negative.   When gets sick , tends towards bronchitis.   Still has swelling of lump near throat, had MRI  And CAT scan and biopsy.   Left tonsil swollen all the time and tonsils taken out in 1st grade. Then gets bad breath that is horrible.   Also had call backs on the mammogram  1st covid shot in " march. On left side  Reviewed all imaging and biopsies and consult notes     On atorvastatin, urine electric orange and all muscles hurt . Felt awful. Symptoms resolved after stopping     Blood pressure was better with lisin hydrochlorothiazide than just lisinopril   Do you feel safe in your environment? Yes    Have you ever done Advance Care Planning? (For example, a Health Directive, POLST, or a discussion with a medical provider or your loved ones about your wishes): No, advance care planning information given to patient to review.  Patient declined advance care planning discussion at this time.       Fall risk  Fallen 2 or more times in the past year?: No  Any fall with injury in the past year?: No    Cognitive Screening   1) Repeat 3 items (Leader, Season, Table)    2) Clock draw: NORMAL  3) 3 item recall: Recalls 3 objects  Results: 3 items recalled: COGNITIVE IMPAIRMENT LESS LIKELY    Mini-CogTM Copyright S Naresh. Licensed by the author for use in [facility]; reprinted with permission (soob@[facility]). All rights reserved.      Do you have sleep apnea, excessive snoring or daytime drowsiness?: no    Reviewed and updated as needed this visit by clinical staff    Allergies  Meds              Reviewed and updated as needed this visit by Provider                 Social History     Tobacco Use     Smoking status: Never     Smokeless tobacco: Never   Substance Use Topics     Alcohol use: No         Alcohol Use 11/6/2022   Prescreen: >3 drinks/day or >7 drinks/week? No   Prescreen: >3 drinks/day or >7 drinks/week? -               Current providers sharing in care for this patient include:    Patient Care Team:  Jenni Valentin MD as PCP - Keisha Estrada MD as Assigned Surgical Provider  Sherlyn Tolentino DO as Assigned PCP    The following health maintenance items are reviewed in Epic and correct as of today:  Health Maintenance   Topic Date Due     HEPATITIS B IMMUNIZATION (1  of 3 - 3-dose series) Never done     ANNUAL REVIEW OF HM ORDERS  10/12/2022     MEDICARE ANNUAL WELLNESS VISIT  10/12/2022     FALL RISK ASSESSMENT  11/07/2023     MAMMO SCREENING  11/30/2023     DTAP/TDAP/TD IMMUNIZATION (2 - Td or Tdap) 11/17/2025     LIPID  10/12/2026     ADVANCE CARE PLANNING  10/12/2026     COLORECTAL CANCER SCREENING  08/06/2030     DEXA  10/29/2035     HEPATITIS C SCREENING  Completed     PHQ-2 (once per calendar year)  Completed     INFLUENZA VACCINE  Completed     Pneumococcal Vaccine: 65+ Years  Completed     ZOSTER IMMUNIZATION  Completed     COVID-19 Vaccine  Completed     IPV IMMUNIZATION  Aged Out     MENINGITIS IMMUNIZATION  Aged Out     BP Readings from Last 3 Encounters:   11/07/22 (!) 145/70   05/20/22 128/69   10/12/21 (!) 160/74    Wt Readings from Last 3 Encounters:   11/07/22 72.2 kg (159 lb 3.2 oz)   10/12/21 73.9 kg (163 lb)   03/04/21 74.4 kg (164 lb)                  Patient Active Problem List   Diagnosis     Menopausal symptoms     Seasonal allergies     Colon polyps     Arthritis of foot, left     AK (actinic keratosis)     Hyperlipidemia     Rosacea     Bunion, left     Joint pain in fingers of left hand     Degenerative arthritis of thumb, left     Essential hypertension     Squamous cell carcinoma in situ of skin of face     Calcaneal spur, right     Eczema of scalp     Current use of estrogen therapy     Osteopenia of lumbar spine     Neck mass     Past Surgical History:   Procedure Laterality Date     ABDOMEN SURGERY  1980,1987?     APPENDECTOMY       BIOPSY  2021     COLONOSCOPY  8-6-2020     COMBINED CYSTOSCOPY, INSERT STENT URETER(S)       HC REMOVAL HEEL SPUR, CALCANEUS Right 02/17/2020    Procedure: EXCISION, BONE SPUR, FOOT, WITH PLANTAR FASCIA RELEASE;  Surgeon: Los Humphrey DPM;  Location: [facility];  Service: Podiatry     HYSTERECTOMY       HYSTERECTOMY TOTAL ABDOMINAL, BILATERAL SALPINGO-OOPHORECTOMY, COMBINED Bilateral 01/01/1985     endometriosis     MOHS MICROGRAPHIC PROCEDURE  01/01/2019      LYMPH NODE BIOPSY  03/16/2021       Social History     Tobacco Use     Smoking status: Never     Smokeless tobacco: Never   Substance Use Topics     Alcohol use: No     Family History   Problem Relation Age of Onset     Breast Cancer Mother         metastasized to intestines      Colon Cancer Mother      Hypertension Father      Diabetes Father      Coronary Artery Disease Father      Esophageal Cancer Father      Other Cancer Father         esophageal     Breast Cancer Paternal Aunt      Kidney Disease Brother         congenital     Diabetes Type 2  Brother      Cerebrovascular Disease Brother      Coronary Artery Disease Brother         bypass      Diabetes Brother      Breast Cancer Paternal Aunt      Cerebrovascular Disease Maternal Grandmother          Current Outpatient Medications   Medication Sig Dispense Refill     betamethasone valerate 0.12 % foam [BETAMETHASONE VALERATE 0.12 % FOAM] Apply foam to scalp and behind ear once to twice daily as needed for flare ups up to 2 weeks at a time 50 g 2     cholecalciferol, vitamin D3, (VITAMIN D3) 2,000 unit cap [CHOLECALCIFEROL, VITAMIN D3, (VITAMIN D3) 2,000 UNIT CAP] Take 1 capsule by mouth daily.       estradiol (ESTRACE) 0.5 MG tablet Take 1/2 (one-half) tablet by mouth once daily 135 tablet 3     losartan-hydrochlorothiazide (HYZAAR) 100-12.5 MG tablet Take 1 tablet by mouth once daily 90 tablet 1     MULTIVITAMIN WITH MINERALS/LUT (MULTIVITAMIN 50 PLUS ORAL) [MULTIVITAMIN WITH MINERALS/LUT (MULTIVITAMIN 50 PLUS ORAL)] Take 1 tablet by mouth daily.       naproxen (NAPROSYN) 500 MG tablet [NAPROXEN (NAPROSYN) 500 MG TABLET] Take 1 tablet (500 mg total) by mouth 2 (two) times a day with meals. Prn for pain 60 tablet 2     atorvastatin (LIPITOR) 20 MG tablet Take 1 tablet (20 mg) by mouth daily (Patient not taking: Reported on 11/7/2022) 90 tablet 3     Allergies   Allergen Reactions     Other  "Environmental Allergy Unknown     Penicillins Unknown     rash     Sulfa (Sulfonamide Antibiotics) [Sulfa Drugs] Unknown     Mouth sores          FHS-7:   Breast CA Risk Assessment (FHS-7) 11/30/2021 11/6/2022   Did any of your first-degree relatives have breast or ovarian cancer? Yes Yes   Did any of your relatives have bilateral breast cancer? Yes Unknown   Did any man in your family have breast cancer? No No   Did any woman in your family have breast and ovarian cancer? No No   Did any woman in your family have breast cancer before age 50 y? Yes Yes   Do you have 2 or more relatives with breast and/or ovarian cancer? No No   Do you have 2 or more relatives with breast and/or bowel cancer? No Unknown       Mammogram Screening: Recommended annual mammography  Pertinent mammograms are reviewed under the imaging tab.    Review of Systems   Constitutional: Negative for chills and fever.   HENT: Negative for congestion, ear pain, hearing loss and sore throat.    Eyes: Negative for pain and visual disturbance.   Respiratory: Negative for cough and shortness of breath.    Cardiovascular: Negative for chest pain, palpitations and peripheral edema.   Gastrointestinal: Negative for abdominal pain, constipation, diarrhea, heartburn, hematochezia and nausea.   Breasts:  Negative for tenderness, breast mass and discharge.   Genitourinary: Negative for dysuria, frequency, genital sores, hematuria, pelvic pain, urgency, vaginal bleeding and vaginal discharge.   Musculoskeletal: Negative for arthralgias, joint swelling and myalgias.   Skin: Negative for rash.   Neurological: Negative for dizziness, weakness, headaches and paresthesias.   Psychiatric/Behavioral: Negative for mood changes. The patient is not nervous/anxious.      Review of Systems  Complete ROS reviewed in HPI or otherwise negative      OBJECTIVE:   Pulse 70   Temp 98.2  F (36.8  C) (Oral)   Resp 12   Ht 1.594 m (5' 2.75\")   Wt 72.2 kg (159 lb 3.2 oz)   SpO2 " "100%   BMI 28.43 kg/m   Estimated body mass index is 28.43 kg/m  as calculated from the following:    Height as of this encounter: 1.594 m (5' 2.75\").    Weight as of this encounter: 72.2 kg (159 lb 3.2 oz).  Physical Exam  GENERAL: healthy, alert and no distress  EYES: Eyes grossly normal to inspection, PERRL and conjunctivae and sclerae normal  HENT: ear canals and TM's normal, nose and mouth without ulcers or lesions  NECK:palpable nodule on left neck no asymmetry, masses, or scars and thyroid normal to palpation  RESP: lungs clear to auscultation - no rales, rhonchi or wheezes  BREAST: normal without masses, tenderness or nipple discharge and no palpable axillary masses or adenopathy  CV: regular rate and rhythm, normal S1 S2, no S3 or S4, no murmur, click or rub, no peripheral edema and peripheral pulses strong  ABDOMEN: soft, nontender, no hepatosplenomegaly, no masses and bowel sounds normal  MS: no gross musculoskeletal defects noted, no edema. Nodular fingers. Pain along posterior heels bilateral  SKIN: no suspicious lesions or rashes  NEURO: Normal strength and tone, mentation intact and speech normal  PSYCH: mentation appears normal, affect normal/bright  LYMPH: no cervical, supraclavicular, axillary, or inguinal adenopathy       Diagnostic Test Results:  Labs reviewed in Epic  No results found for this or any previous visit (from the past 24 hour(s)).  Recent Results (from the past 720 hour(s))   Lipid Profile (Chol, Trig, HDL, LDL calc)    Collection Time: 11/07/22 11:25 AM   Result Value Ref Range    Cholesterol 235 (H) <200 mg/dL    Triglycerides 167 (H) <150 mg/dL    Direct Measure HDL 48 (L) >=50 mg/dL    LDL Cholesterol Calculated 154 (H) <=100 mg/dL    Non HDL Cholesterol 187 (H) <130 mg/dL   Comprehensive metabolic panel (BMP + Alb, Alk Phos, ALT, AST, Total. Bili, TP)    Collection Time: 11/07/22 11:25 AM   Result Value Ref Range    Sodium 140 136 - 145 mmol/L    Potassium 3.8 3.4 - 5.3 mmol/L "    Chloride 102 98 - 107 mmol/L    Carbon Dioxide (CO2) 27 22 - 29 mmol/L    Anion Gap 11 7 - 15 mmol/L    Urea Nitrogen 19.6 8.0 - 23.0 mg/dL    Creatinine 0.82 0.51 - 0.95 mg/dL    Calcium 9.3 8.8 - 10.2 mg/dL    Glucose 109 (H) 70 - 99 mg/dL    Alkaline Phosphatase 66 35 - 104 U/L    AST 21 10 - 35 U/L    ALT 15 10 - 35 U/L    Protein Total 7.5 6.4 - 8.3 g/dL    Albumin 4.4 3.5 - 5.2 g/dL    Bilirubin Total 0.4 <=1.2 mg/dL    GFR Estimate 77 >60 mL/min/1.73m2   CBC with platelets    Collection Time: 11/07/22 11:25 AM   Result Value Ref Range    WBC Count 5.5 4.0 - 11.0 10e3/uL    RBC Count 4.35 3.80 - 5.20 10e6/uL    Hemoglobin 13.1 11.7 - 15.7 g/dL    Hematocrit 39.3 35.0 - 47.0 %    MCV 90 78 - 100 fL    MCH 30.1 26.5 - 33.0 pg    MCHC 33.3 31.5 - 36.5 g/dL    RDW 13.0 10.0 - 15.0 %    Platelet Count 233 150 - 450 10e3/uL   ESR: Erythrocyte sedimentation rate    Collection Time: 11/07/22 11:25 AM   Result Value Ref Range    Erythrocyte Sedimentation Rate 29 (H) 0 - 20 mm/hr   CRP inflammation    Collection Time: 11/07/22 11:25 AM   Result Value Ref Range    CRP Inflammation 9.31 (H) <5.00 mg/L   TSH with free T4 reflex    Collection Time: 11/07/22 11:25 AM   Result Value Ref Range    TSH 3.48 0.30 - 4.20 uIU/mL   Total Protein, Serum for ELP    Collection Time: 11/07/22 11:25 AM   Result Value Ref Range    Total Protein Serum for ELP 7.2 6.4 - 8.3 g/dL   Protein Electrophoresis, Serum    Collection Time: 11/07/22 11:25 AM   Result Value Ref Range    Albumin 4.2 3.7 - 5.1 g/dL    Alpha 1 0.3 0.2 - 0.4 g/dL    Alpha 2 0.9 0.5 - 0.9 g/dL    Beta Globulin 0.8 0.6 - 1.0 g/dL    Gamma Globulin 1.0 0.7 - 1.6 g/dL    Monoclonal Peak 0.0 <=0.0 g/dL    ELP Interpretation       Essentially normal electrophoretic pattern. No obvious monoclonal proteins seen. Pathologic significance requires clinical correlation. Jonny Smyth MD   Hemoglobin A1c    Collection Time: 11/07/22 11:25 AM   Result Value Ref Range     Hemoglobin A1C 5.9 (H) 0.0 - 5.6 %       ASSESSMENT / PLAN:   Yennifer was seen today for physical.    Diagnoses and all orders for this visit:    Encounter for Medicare annual wellness exam- wellness exam with the following issues addressed below  -     Lipid Profile (Chol, Trig, HDL, LDL calc); Future  -     Comprehensive metabolic panel (BMP + Alb, Alk Phos, ALT, AST, Total. Bili, TP); Future  -     CBC with platelets; Future  -     Protein electrophoresis; Future  -     ESR: Erythrocyte sedimentation rate; Future  -     CRP inflammation; Future  -     TSH with free T4 reflex; Future  -     Lipid Profile (Chol, Trig, HDL, LDL calc)  -     Comprehensive metabolic panel (BMP + Alb, Alk Phos, ALT, AST, Total. Bili, TP)  -     CBC with platelets  -     Protein electrophoresis  -     ESR: Erythrocyte sedimentation rate  -     CRP inflammation  -     TSH with free T4 reflex    Essential hypertension-blood pressure mildly elevated at this time.  Discussed with patient taking some ambulatory blood pressures and reporting back to me.  If in normal range we will continue current dosing.  Labs stable.  -     losartan-hydrochlorothiazide (HYZAAR) 100-12.5 MG tablet; Take 1 tablet by mouth daily    Eczema of scalp-unfortunately betamethasone is not covered by patient's insurance any longer or if it is it is quite expensive.  We did discuss utilizing lotion which may be more cost effective.  -     Discontinue: betamethasone valerate 0.12 % FOAM; Apply foam to scalp and behind ear once to twice daily as needed for flare ups up to 2 weeks at a time  -     betamethasone dipropionate (DIPROSONE) 0.05 % external lotion; Apply topically 2 times daily Topically to scalp and behind ears as directed    Hot flashes-patient will be continuing to utilize estradiol and has been gradually working on reducing her intake of estradiol.  Continue current regimen.  Continue annual mammogram  -     estradiol (ESTRACE) 0.5 MG tablet; Take 1/2  (one-half) tablet by mouth once daily    Lipid screening  -     Lipid Profile (Chol, Trig, HDL, LDL calc); Future  -     Lipid Profile (Chol, Trig, HDL, LDL calc)    Current use of estrogen therapy    Neck mass-reviewed work-up done by ENT as well as previous imaging.  My recommendation would be to repeat imaging of the neck to evaluate for any enlargement of previous lesion noted especially given elevated CRP and ESR.  Depending on results can decide how to proceed with follow-up.  -     CBC with platelets; Future  -     Protein electrophoresis; Future  -     ESR: Erythrocyte sedimentation rate; Future  -     CRP inflammation; Future  -     TSH with free T4 reflex; Future  -     CBC with platelets  -     Protein electrophoresis  -     ESR: Erythrocyte sedimentation rate  -     CRP inflammation  -     TSH with free T4 reflex    Chronic pain of left thumb-recommendation was to go to orthopedics to consider steroid injection of the thumbs or further recommendation for surgical management.  May consider seeing rheumatology as well.    Arthritis of both hands-referrals placed for physical therapy.  Consider hand PT as 1 form of management for pain.  Referral placed for Ortho as well as rheumatology.  -     Orthopedic  Referral; Future  -     Physical Therapy Referral; Future  -     Adult Rheumatology  Referral; Future    Left cervical lymphadenopathy-as noted recommendation was to do head and neck ultrasound.  Recommended CT of the chest as well given potential concern of seeding from the chest -reactive neoplasm.  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 -Prominent left subpectoral lymph nodes as described on the previous soft tissue neck CT  -      "CT Chest w/o Contrast; Future    Abnormal findings on diagnostic imaging of other specified body structures  -     TSH with free T4 reflex; Future  -     TSH with free T4 reflex    Cough, unspecified type-refills provided to be utilized when has acute URI episodes  -     albuterol (PROAIR HFA/PROVENTIL HFA/VENTOLIN HFA) 108 (90 Base) MCG/ACT inhaler; Inhale 2 puffs into the lungs every 6 hours    Elevated fasting glucose. A1c came back in prediabetic range  -     Hemoglobin A1c    Heel pain, bilateral- discussed stretches.   -     Physical Therapy Referral; Future    Polyarthralgia  -     Physical Therapy Referral; Future  -     Adult Rheumatology  Referral; Future    Other orders  -     REVIEW OF HEALTH MAINTENANCE PROTOCOL ORDERS        Patient has been advised of split billing requirements and indicates understanding: Yes      COUNSELING:  Reviewed preventive health counseling, as reflected in patient instructions       Regular exercise       Healthy diet/nutrition    Estimated body mass index is 28.43 kg/m  as calculated from the following:    Height as of this encounter: 1.594 m (5' 2.75\").    Weight as of this encounter: 72.2 kg (159 lb 3.2 oz).        She reports that she has never smoked. She has never used smokeless tobacco.      Appropriate preventive services were discussed with this patient, including applicable screening as appropriate for cardiovascular disease, diabetes, osteopenia/osteoporosis, and glaucoma.  As appropriate for age/gender, discussed screening for colorectal cancer, prostate cancer, breast cancer, and cervical cancer. Checklist reviewing preventive services available has been given to the patient.    Reviewed patients plan of care and provided an AVS. The Intermediate Care Plan ( asthma action plan, low back pain action plan, and migraine action plan) for Yennifer meets the Care Plan requirement. This Care Plan has been established and reviewed with the Patient.    Counseling " Resources:  ATP IV Guidelines  Pooled Cohorts Equation Calculator  Breast Cancer Risk Calculator  Breast Cancer: Medication to Reduce Risk  FRAX Risk Assessment  ICSI Preventive Guidelines  Dietary Guidelines for Americans, 2010  USDA's MyPlate  ASA Prophylaxis  Lung CA Screening    Jenni Valentin DO  [facility]    Identified Health Risks:

## 2022-11-07 NOTE — TELEPHONE ENCOUNTER
Incoming call from Pharmacy: [facility] [place]  Pt's insurnace requires PA for betamethasone valerate 0.12 % FOAM, pharmacy can fill prescription with cream or ointment without PA, please call back pharmacy to advise  [phone]

## 2022-11-08 LAB
ALBUMIN SERPL ELPH-MCNC: 4.2 G/DL (ref 3.7–5.1)
ALPHA1 GLOB SERPL ELPH-MCNC: 0.3 G/DL (ref 0.2–0.4)
ALPHA2 GLOB SERPL ELPH-MCNC: 0.9 G/DL (ref 0.5–0.9)
B-GLOBULIN SERPL ELPH-MCNC: 0.8 G/DL (ref 0.6–1)
GAMMA GLOB SERPL ELPH-MCNC: 1 G/DL (ref 0.7–1.6)
M PROTEIN SERPL ELPH-MCNC: 0 G/DL
PROT PATTERN SERPL ELPH-IMP: NORMAL
TOTAL PROTEIN SERUM FOR ELP: 7.2 G/DL (ref 6.4–8.3)

## 2022-11-09 NOTE — TELEPHONE ENCOUNTER
Prior Authorization Approval    Authorization Effective Date: 10/10/2022  Authorization Expiration Date: 11/9/2023  Medication: betamethasone valerate 0.12 % FOAM  Approved Dose/Quantity:   Reference #: [id number]   Insurance Company: [employer] - Phone [phone] Fax [phone]  Which Pharmacy is filling the prescription (Not needed for infusion/clinic administered): [facility] [address] [place] [address]  Pharmacy Notified: Yes  Patient Notified: Yes

## 2022-11-09 NOTE — TELEPHONE ENCOUNTER
PA Initiation    Medication: betamethasone valerate 0.12 % FOAM  Insurance Company: [employer] - Phone [phone] Fax [phone]  Pharmacy Filling the Rx: [facility] [address]  Filling Pharmacy Phone: [phone]  Filling Pharmacy Fax: [phone]  Start Date: 11/9/2022    Yennifer Castro Key: [id number] - PA Case ID: [id number]

## 2022-11-10 LAB — HBA1C MFR BLD: 5.9 % (ref 0–5.6)

## 2022-11-16 VITALS
HEIGHT: 63 IN | OXYGEN SATURATION: 100 % | HEART RATE: 70 BPM | BODY MASS INDEX: 28.21 KG/M2 | SYSTOLIC BLOOD PRESSURE: 128 MMHG | WEIGHT: 159.2 LBS | RESPIRATION RATE: 12 BRPM | TEMPERATURE: 98.2 F | DIASTOLIC BLOOD PRESSURE: 73 MMHG

## 2022-11-16 PROBLEM — M19.041 ARTHRITIS OF BOTH HANDS: Status: ACTIVE | Noted: 2022-11-16

## 2022-11-16 PROBLEM — G89.29 CHRONIC PAIN OF LEFT THUMB: Status: ACTIVE | Noted: 2022-11-16

## 2022-11-16 PROBLEM — D49.89: Status: ACTIVE | Noted: 2022-11-16

## 2022-11-16 PROBLEM — M79.645 CHRONIC PAIN OF LEFT THUMB: Status: ACTIVE | Noted: 2022-11-16

## 2022-11-16 PROBLEM — R59.0 LEFT CERVICAL LYMPHADENOPATHY: Status: ACTIVE | Noted: 2022-11-16

## 2022-11-16 PROBLEM — M19.042 ARTHRITIS OF BOTH HANDS: Status: ACTIVE | Noted: 2022-11-16

## 2022-11-16 RX ORDER — BETAMETHASONE DIPROPIONATE 0.5 MG/G
LOTION TOPICAL 2 TIMES DAILY
Qty: 60 ML | Refills: 4 | Status: SHIPPED | OUTPATIENT
Start: 2022-11-16 | End: 2023-11-10

## 2022-11-18 ENCOUNTER — TRANSFERRED RECORDS (OUTPATIENT)
Dept: HEALTH INFORMATION MANAGEMENT | Facility: CLINIC | Age: 72
End: 2022-11-18

## 2022-11-21 ENCOUNTER — HOSPITAL ENCOUNTER (OUTPATIENT)
Dept: CT IMAGING | Facility: CLINIC | Age: 72
Discharge: HOME | End: 2022-11-21
Attending: FAMILY MEDICINE
Payer: COMMERCIAL

## 2022-11-21 ENCOUNTER — HOSPITAL ENCOUNTER (OUTPATIENT)
Dept: ULTRASOUND IMAGING | Facility: CLINIC | Age: 72
Discharge: HOME | End: 2022-11-21
Attending: FAMILY MEDICINE
Payer: COMMERCIAL

## 2022-11-21 DIAGNOSIS — R59.0 LEFT CERVICAL LYMPHADENOPATHY: ICD-10-CM

## 2022-11-21 DIAGNOSIS — D49.89: ICD-10-CM

## 2022-11-21 PROCEDURE — 76536 US EXAM OF HEAD AND NECK: CPT

## 2022-11-21 PROCEDURE — 71260 CT THORAX DX C+: CPT

## 2022-11-21 PROCEDURE — 250N000011 HC RX IP 250 OP 636: Performed by: FAMILY MEDICINE

## 2022-11-21 RX ORDER — IOPAMIDOL 755 MG/ML
100 INJECTION, SOLUTION INTRAVASCULAR ONCE
Status: COMPLETED | OUTPATIENT
Start: 2022-11-21 | End: 2022-11-21

## 2022-11-21 RX ADMIN — IOPAMIDOL 100 ML: 755 INJECTION, SOLUTION INTRAVENOUS at 20:36

## 2022-11-23 DIAGNOSIS — D49.89: Primary | ICD-10-CM

## 2022-12-01 ENCOUNTER — HOSPITAL ENCOUNTER (OUTPATIENT)
Dept: MAMMOGRAPHY | Facility: CLINIC | Age: 72
Discharge: HOME | End: 2022-12-01
Attending: FAMILY MEDICINE | Admitting: FAMILY MEDICINE
Payer: COMMERCIAL

## 2022-12-01 DIAGNOSIS — Z12.31 VISIT FOR SCREENING MAMMOGRAM: ICD-10-CM

## 2022-12-01 PROCEDURE — 77067 SCR MAMMO BI INCL CAD: CPT

## 2023-01-01 NOTE — ADDENDUM NOTE
Encounter addended by: Letha Malcolm, OT on: 1/1/2023 12:52 PM   Actions taken: Episode resolved, Clinical Note Signed, Flowsheet accepted

## 2023-01-01 NOTE — PROGRESS NOTES
[facility]    Outpatient Occupational Therapy Discharge Note  Patient: Yennifer Castro  : 1950    Beginning/End Dates of Reporting Period:  21 to 21    Referring Provider: Dr. Sherlyn Tolentino    Therapy Diagnosis: bilateral thumb pain and hand weakness, decreased ADL/IADL function    Client Self Report: Patient reports that symptoms are about the same    Goals:  Not met  Goal Identifier     Goal Description Patient will be able to perform meal prep with less thumb pain   Target Date 22   Date Met      Progress (detail required for progress note):       Goal Identifier     Goal Description Patient will be able to lift and carry for grocery shopping with less thumb pain   Target Date 22   Date Met      Progress (detail required for progress note):       Goal Identifier     Goal Description Patient will be able to perform grooming tasks with less pain   Target Date 22   Date Met      Progress (detail required for progress note):       Plan:  Discharge from therapy.

## 2023-01-01 NOTE — TELEPHONE ENCOUNTER
Pt called with concern. She had EXCISION, BONE SPUR, FOOT, WITH PLANTAR FASCIA RELEASE on 2/17/20 with Dr. Humphrey. She stated that her bandage had fluid that leaked which is [id number] and has dried blood. She stated that is it no longer leaking. She had an ace bandage on. She was wondering if she needed to come in to have it changed. Writer informed her to keep the bandage intact. Dr. Humphrey will contact pt if he would like the patient to do something different.    Subjective   Gera Levin is a 7 m.o. female who presents for Cough, Nasal Congestion, Rash, and Fever (Has had symptoms for about 2 weeks. ).  Today she is accompanied by mother    Seen 9/23 at Urgent care- diagnosed HFM     URI  This is a new problem. Episode onset: 2 weeks. Progression since onset: mostly improving but congestion is worsening. Associated symptoms include a change in bowel habit (improving), congestion, coughing, a fever (no fever for 2 days now), nausea, a rash (still on buttock, around eyes and nose, some faint red blotches) and vomiting (none for 2 days).    Eating fine   Tylenol and ibuprofen   Waking up frequently overnight     Review of Systems   Constitutional:  Positive for fever (no fever for 2 days now).   HENT:  Positive for congestion.    Respiratory:  Positive for cough.    Gastrointestinal:  Positive for change in bowel habit (improving), nausea and vomiting (none for 2 days).   Skin:  Positive for rash (still on buttock, around eyes and nose, some faint red blotches).     A ROS was completed and all systems are negative with the exception of what is noted in HPI.     Objective   Pulse 123   Temp (!) 36 °C (96.8 °F)   Resp 34   Wt 6.634 kg   SpO2 98%   Growth percentiles: No height on file for this encounter. 8 %ile (Z= -1.40) based on WHO (Girls, 0-2 years) weight-for-age data using vitals from 2023.     Physical Exam  Constitutional:       General: She is not in acute distress.     Appearance: She is not toxic-appearing.   HENT:      Head: Anterior fontanelle is flat.      Right Ear: A middle ear effusion is present. Tympanic membrane is erythematous and bulging.      Left Ear: Tympanic membrane normal.      Nose: Nose normal.      Mouth/Throat:      Mouth: Mucous membranes are moist.      Pharynx: Oropharynx is clear.   Eyes:      Conjunctiva/sclera: Conjunctivae normal.   Cardiovascular:      Rate and Rhythm: Normal rate and regular rhythm.   Pulmonary:      Effort:  Pulmonary effort is normal.      Breath sounds: Normal breath sounds.   Musculoskeletal:      Cervical back: Normal range of motion.   Lymphadenopathy:      Cervical: No cervical adenopathy.   Skin:     General: Skin is warm and dry.   Neurological:      Mental Status: She is alert.         Assessment/Plan   Problem List Items Addressed This Visit       GERD (gastroesophageal reflux disease)    Relevant Medications    famotidine (Pepcid) 40 mg/5 mL (8 mg/mL) suspension     Other Visit Diagnoses       Right otitis media, unspecified otitis media type    -  Primary    Relevant Medications    amoxicillin (Amoxil) 400 mg/5 mL suspension          Treated for right OM. Take full course of antibiotics even if feeling better. If no improvement or worsening symptoms, patient should return to office. Educated on symptom management with pain reliever. Fluid can sit behind ear drum for several weeks after infection has resolved. Child may still feel pressure or be tugging at ears. Return to office if pain is severe or if child has fever. Parent verbalized understanding.    Mom requesting sending the famotidine again. She did not  and prescription no longer available at pharmacy             SALVADOR Conte-CNP

## 2023-03-20 ENCOUNTER — OFFICE VISIT (OUTPATIENT)
Dept: RHEUMATOLOGY | Facility: CLINIC | Age: 73
End: 2023-03-20
Payer: COMMERCIAL

## 2023-03-20 VITALS
BODY MASS INDEX: 28.55 KG/M2 | SYSTOLIC BLOOD PRESSURE: 130 MMHG | WEIGHT: 159.9 LBS | DIASTOLIC BLOOD PRESSURE: 72 MMHG | HEART RATE: 78 BPM

## 2023-03-20 DIAGNOSIS — M15.0 PRIMARY OSTEOARTHRITIS INVOLVING MULTIPLE JOINTS: ICD-10-CM

## 2023-03-20 DIAGNOSIS — M25.50 POLYARTHRALGIA: Primary | ICD-10-CM

## 2023-03-20 DIAGNOSIS — R70.0 SEDIMENTATION RATE ELEVATION: ICD-10-CM

## 2023-03-20 PROCEDURE — 99204 OFFICE O/P NEW MOD 45 MIN: CPT | Performed by: INTERNAL MEDICINE

## 2023-03-20 RX ORDER — DULOXETIN HYDROCHLORIDE 20 MG/1
20 CAPSULE, DELAYED RELEASE ORAL DAILY
Qty: 30 CAPSULE | Refills: 2 | Status: SHIPPED | OUTPATIENT
Start: 2023-03-20 | End: 2023-11-10

## 2023-03-20 NOTE — PROGRESS NOTES
This document was created using a software with less than 100% fidelity, at times resulting in unintended, even erroneous syntax and grammar.  The reader is advised to keep this under consideration while reviewing, interpreting this note.      Rheumatology Consult Note      Yennifer Castro     YOB: 1950 Age: 72 year old    Date of visit: 3/20/23    PCP: Jenni Valentin    Chief Complaint   Patient presents with:  Consult      Assessment and Plan     Yennifer was seen today for consult.    Diagnoses and all orders for this visit:    Polyarthralgia  -     DULoxetine (CYMBALTA) 20 MG capsule; Take 1 capsule (20 mg) by mouth daily for 30 days    Primary osteoarthritis involving multiple joints  -     DULoxetine (CYMBALTA) 20 MG capsule; Take 1 capsule (20 mg) by mouth daily for 30 days    Sedimentation rate elevation           This patient has polyarthralgia in association with osteoarthritis.  The likelihood that she has inflammatory joint disease appears to be remote.  This is discussed she is reassured.  The likelihood of connective tissue disease similarly is remote.  There is no indications for her to consider DMARDs.  Management principles of osteoarthritis were reviewed.  She is doing exactly the right things right now such as taking acetaminophen for most times and occasionally naproxen but does not take ibuprofen.  She can have literature is provided.  She is going to consider trying it.  Repeat corticosteroid injections at the [facility].  We discussed duloxetine.  We discussed staying active and other nonpharmacologic measures.  The likelihood that her modest acute phase response is secondary to a rheumatologic etiology while consideration would appear to be remote have reassured her on that count.    Return to clinic: 3 months      HPI   Yennifer Castro is a 72 year old female  is seen today for evaluation of polyarthralgias, elevated sed rate and 29.  She reports that she has  "had joint pains going back 3 years possibly 5.  Some of her symptoms the first CMC's bilaterally she was seen in orthopedics in December last year when she had a corticosteroid injection into each of the first CMC's by her description that lasted for about a month of relief and then return where she is today with moderately severe pain, sometimes the symptoms get worse than other days and then she takes naproxen otherwise Tylenol.  She has noted pain in her heels she was seen in podiatry and had \"a bone spur removed on her right side she anticipates similar intervention on the left when she sees her podiatrist again shortly.  She has had discomfort in the DIPs of her digits.  She reports minimal discomfort in her knees.  Left shoulder sometimes troubles her when she lays on that side.  This is more intermittent there is no radiation down the arm.  She reports no personal or family history of psoriasis ulcerative colitis Crohn's disease no family history of lupus rheumatoid arthritis or ankylosing spondylitis.  Her primary physician had checked her sed rate that was 29.  This was on 11/7/2022.  Prior to that and 2018 this was minimally elevated as well at 21.  Her C-reactive protein was 9.31 and November of last year..  In the past her NEGRITO rheumatoid factor anti-CCP antibody have been negative.  She is not a smoker.  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           Active Problem List     Patient Active Problem List   Diagnosis     Menopausal symptoms     Seasonal allergies     Colon polyps     Arthritis of foot, left     AK (actinic keratosis)     Hyperlipidemia     Rosacea     Bunion, left     Joint pain in fingers of left hand     Degenerative arthritis of thumb, left     Essential hypertension     Squamous cell carcinoma in situ of skin of face     Calcaneal spur, right     Eczema of scalp     Current use of estrogen therapy     Osteopenia of lumbar spine     Neck mass     Chronic " pain of left thumb     Arthritis of both hands     Left cervical lymphadenopathy     Neoplasm of pectoral axillary lymph nodes     Past Medical History     Past Medical History:   Diagnosis Date     Arthritis      Colon polyp     2012 and 2017     Endometriosis      History of anesthesia complications      History of transfusion      Hypertension      Past Surgical History     Past Surgical History:   Procedure Laterality Date     ABDOMEN SURGERY  1980,1987?     APPENDECTOMY       BIOPSY  2021     COLONOSCOPY  8-6-2020     COMBINED CYSTOSCOPY, INSERT STENT URETER(S)       HC REMOVAL HEEL SPUR, CALCANEUS Right 02/17/2020    Procedure: EXCISION, BONE SPUR, FOOT, WITH PLANTAR FASCIA RELEASE;  Surgeon: Los Humphrey DPM;  [facility];  Service: Podiatry     HYSTERECTOMY       HYSTERECTOMY TOTAL ABDOMINAL, BILATERAL SALPINGO-OOPHORECTOMY, COMBINED Bilateral 01/01/1985    endometriosis     MOHS MICROGRAPHIC PROCEDURE  01/01/2019      LYMPH NODE BIOPSY  03/16/2021     Allergy     Allergies   Allergen Reactions     Atorvastatin      Orange urine and all muscles hurt      Other Environmental Allergy Unknown     Penicillins Unknown     rash     Sulfa (Sulfonamide Antibiotics) [Sulfa Drugs] Unknown     Mouth sores      Current Medication List     Current Outpatient Medications   Medication Sig     albuterol (PROAIR HFA/PROVENTIL HFA/VENTOLIN HFA) 108 (90 Base) MCG/ACT inhaler Inhale 2 puffs into the lungs every 6 hours     betamethasone dipropionate (DIPROSONE) 0.05 % external lotion Apply topically 2 times daily Topically to scalp and behind ears as directed     cholecalciferol, vitamin D3, (VITAMIN D3) 2,000 unit cap [CHOLECALCIFEROL, VITAMIN D3, (VITAMIN D3) 2,000 UNIT CAP] Take 1 capsule by mouth daily.     clobetasol propionate (OLUX) 0.05 % external foam Apply foam to scalp and behind ear once to twice daily as needed for flare ups up to 2 weeks at a time     estradiol (ESTRACE) 0.5 MG tablet Take  1/2 (one-half) tablet by mouth once daily     losartan-hydrochlorothiazide (HYZAAR) 100-12.5 MG tablet Take 1 tablet by mouth daily     Multiple Vitamins-Minerals (PRESERVISION AREDS 2+MULTI VIT PO)      naproxen (NAPROSYN) 500 MG tablet TAKE 1 TABLET BY MOUTH TWICE DAILY WITH MEALS AS NEEDED FOR PAIN     No current facility-administered medications for this visit.            Family History     Family History   Problem Relation Age of Onset     Breast Cancer Mother         metastasized to intestines      Colon Cancer Mother      Hypertension Father      Diabetes Father      Coronary Artery Disease Father      Esophageal Cancer Father      Other Cancer Father         esophageal     Breast Cancer Paternal Aunt      Kidney Disease Brother         congenital     Diabetes Type 2  Brother      Cerebrovascular Disease Brother      Coronary Artery Disease Brother         bypass      Diabetes Brother      Breast Cancer Paternal Aunt      Cerebrovascular Disease Maternal Grandmother          Physical Exam     COMPREHENSIVE EXAMINATION:  Vitals:    03/20/23 0955   BP: 130/72   Pulse: 78   Weight: 72.5 kg (159 lb 14.4 oz)     A well appearing alert oriented female. Vital data as noted above. Her eyes examined for inflammation/scleromalacia. ENT examined for oral mucositis, moisture, thrush, nasal deformity, external ear redness, deformity. Her neck is examined for suppleness and lymphadenopathy.  Cardiopulmonary examination without dyspnea at rest, use of accessory muscles of breathing, wheezing, edema, peripheral or central cyanosis.  Abdomen examined for softness, tenderness, obvious organomegaly.  Skin examined for heliotrope, malar area eruption, lupus pernio, periungual erythema, sclerodactyly, papules, erythema nodosum, purpura, nail pitting, onycholysis, and obvious psoriasis lesion. Neurological examination done for alertness, speech, facial symmetry,  tone and power in upper and lower extremities, and gait. The joint  examination is performed for swelling, tenderness, warmth, erythema, and range of motion in the following joints: DIPs, PIPs, MCPs, wrists, first CMC's, elbows, shoulders, hips, knees, ankles, feet; spine for range of motion and paraspinal muscles for tenderness. The salient normal / abnormal findings are appended.  She does not have synovitis and palpable joints of upper and lower extremities.  Her nails are painted.  She has marked Heberden's which are tender suggestive index and middle where she has deformity as well, squaring of the first CMC mild grinding causes crepitus and pain.  She has no impingement of the shoulder especially the left side.  She has no tenderness of the joint line of the knees no trochanteric area tenderness.  She is tender at the heel, on the left side commensurate with plantar fasciitis.  She has scar on the right foot medially near the heel from the prior surgery.  She does not have dactylitis of digits or toes.    Labs / Imaging (select studies)     Uric Acid   Date Value Ref Range Status   10/09/2020 6.4 2.0 - 7.5 mg/dL Final      Hemoglobin   Date Value Ref Range Status   11/07/2022 13.1 11.7 - 15.7 g/dL Final   10/12/2021 13.3 11.7 - 15.7 g/dL Final   02/14/2020 13.7 12.0 - 16.0 g/dL Final     Urea Nitrogen   Date Value Ref Range Status   11/07/2022 19.6 8.0 - 23.0 mg/dL Final   05/20/2022 24 (H) 8 - 22 mg/dL Final   10/12/2021 18 8 - 22 mg/dL Final   10/09/2020 19 8 - 22 mg/dL Final     Erythrocyte Sedimentation Rate   Date Value Ref Range Status   11/07/2022 29 (H) 0 - 20 mm/hr Final   11/15/2018 21 (H) 0 - 20 mm/hr Final     CRP   Date Value Ref Range Status   10/09/2020 1.0 (H) 0.0 - 0.8 mg/dL Final   11/15/2018 0.4 0.0 - 0.8 mg/dL Final     AST   Date Value Ref Range Status   11/07/2022 21 10 - 35 U/L Final   10/12/2021 18 0 - 40 U/L Final   10/09/2020 18 0 - 40 U/L Final     Albumin   Date Value Ref Range Status   11/07/2022 4.4 3.5 - 5.2 g/dL Final   10/12/2021 4.0 3.5 - 5.0  g/dL Final   10/09/2020 4.3 3.5 - 5.0 g/dL Final   12/27/2019 4.0 3.5 - 5.0 g/dL Final     Alkaline Phosphatase   Date Value Ref Range Status   11/07/2022 66 35 - 104 U/L Final   10/12/2021 67 45 - 120 U/L Final   10/09/2020 68 45 - 120 U/L Final     ALT   Date Value Ref Range Status   11/07/2022 15 10 - 35 U/L Final   10/12/2021 12 0 - 45 U/L Final   10/09/2020 13 0 - 45 U/L Final          Immunization History     Immunization History   Administered Date(s) Administered     COVID-19 Vaccine 18+ (Moderna) 03/02/2021, 03/30/2021, 10/29/2021, 05/17/2022     COVID-19 Vaccine Bivalent Booster 18+ (Moderna) 10/27/2022     FLUAD(HD)65+ QUAD 10/06/2021, 10/27/2022     Flu 65+ Years 10/14/2019, 10/07/2021     Flu, Unspecified 10/15/2015, 10/03/2017, 10/14/2019     Influenza (H1N1) 12/31/2009     Influenza (High Dose) 3 valent vaccine 10/17/2018     Influenza Vaccine 65+ (Fluzone HD) 10/07/2020     Influenza Vaccine >6 months (Alfuria,Fluzone) 10/03/2017     Influenza Vaccine, 6+MO IM (QUADRIVALENT W/PRESERVATIVES) 09/30/2011, 11/07/2014, 11/14/2016     Pneumo Conj 13-V (2010&after) 01/09/2018     Pneumococcal 23 valent 11/15/2018     Td (Adult), Adsorbed 1950     Tdap (Adacel,Boostrix) 11/17/2015     Zoster vaccine recombinant adjuvanted (SHINGRIX) 05/02/2019, 07/16/2019     Zoster vaccine, live 11/13/2014

## 2023-10-22 DIAGNOSIS — I10 ESSENTIAL HYPERTENSION: ICD-10-CM

## 2023-10-23 RX ORDER — LOSARTAN POTASSIUM AND HYDROCHLOROTHIAZIDE 12.5; 1 MG/1; MG/1
1 TABLET ORAL DAILY
Qty: 90 TABLET | Refills: 0 | Status: SHIPPED | OUTPATIENT
Start: 2023-10-23 | End: 2023-11-10

## 2023-11-04 ASSESSMENT — ENCOUNTER SYMPTOMS
NERVOUS/ANXIOUS: 0
CHILLS: 0
FEVER: 0
HEARTBURN: 0
ABDOMINAL PAIN: 0
ARTHRALGIAS: 0
NAUSEA: 0
DYSURIA: 0
MYALGIAS: 0
SHORTNESS OF BREATH: 0
HEADACHES: 0
EYE PAIN: 0
FREQUENCY: 0
HEMATURIA: 0
COUGH: 0
PALPITATIONS: 0
WEAKNESS: 0
BREAST MASS: 0
PARESTHESIAS: 0
DIZZINESS: 0
HEMATOCHEZIA: 0
JOINT SWELLING: 0
DIARRHEA: 0
CONSTIPATION: 0
SORE THROAT: 0

## 2023-11-04 ASSESSMENT — ACTIVITIES OF DAILY LIVING (ADL): CURRENT_FUNCTION: NO ASSISTANCE NEEDED

## 2023-11-10 ENCOUNTER — OFFICE VISIT (OUTPATIENT)
Dept: FAMILY MEDICINE | Facility: CLINIC | Age: 73
End: 2023-11-10
Payer: COMMERCIAL

## 2023-11-10 VITALS
DIASTOLIC BLOOD PRESSURE: 71 MMHG | BODY MASS INDEX: 27.86 KG/M2 | TEMPERATURE: 98 F | HEART RATE: 73 BPM | WEIGHT: 157.25 LBS | RESPIRATION RATE: 16 BRPM | OXYGEN SATURATION: 100 % | HEIGHT: 63 IN | SYSTOLIC BLOOD PRESSURE: 135 MMHG

## 2023-11-10 DIAGNOSIS — R73.03 PREDIABETES: ICD-10-CM

## 2023-11-10 DIAGNOSIS — Z13.220 LIPID SCREENING: ICD-10-CM

## 2023-11-10 DIAGNOSIS — R22.1 NECK MASS: ICD-10-CM

## 2023-11-10 DIAGNOSIS — R59.0 LEFT CERVICAL LYMPHADENOPATHY: ICD-10-CM

## 2023-11-10 DIAGNOSIS — M18.12 DEGENERATIVE ARTHRITIS OF THUMB, LEFT: ICD-10-CM

## 2023-11-10 DIAGNOSIS — M25.532 LEFT WRIST PAIN: ICD-10-CM

## 2023-11-10 DIAGNOSIS — R23.2 HOT FLASHES: ICD-10-CM

## 2023-11-10 DIAGNOSIS — Z78.0 ASYMPTOMATIC POSTMENOPAUSAL STATUS: ICD-10-CM

## 2023-11-10 DIAGNOSIS — Z00.00 ENCOUNTER FOR MEDICARE ANNUAL WELLNESS EXAM: Primary | ICD-10-CM

## 2023-11-10 DIAGNOSIS — M85.80 OSTEOPENIA, UNSPECIFIED LOCATION: ICD-10-CM

## 2023-11-10 DIAGNOSIS — I10 ESSENTIAL HYPERTENSION: ICD-10-CM

## 2023-11-10 DIAGNOSIS — L30.9 ECZEMA OF SCALP: ICD-10-CM

## 2023-11-10 LAB
ALBUMIN SERPL BCG-MCNC: 4.5 G/DL (ref 3.5–5.2)
ALP SERPL-CCNC: 70 U/L (ref 35–104)
ALT SERPL W P-5'-P-CCNC: 14 U/L (ref 0–50)
ANION GAP SERPL CALCULATED.3IONS-SCNC: 12 MMOL/L (ref 7–15)
AST SERPL W P-5'-P-CCNC: 19 U/L (ref 0–45)
BILIRUB SERPL-MCNC: 0.4 MG/DL
BUN SERPL-MCNC: 28.3 MG/DL (ref 8–23)
CALCIUM SERPL-MCNC: 9.7 MG/DL (ref 8.8–10.2)
CHLORIDE SERPL-SCNC: 102 MMOL/L (ref 98–107)
CHOLEST SERPL-MCNC: 221 MG/DL
CREAT SERPL-MCNC: 0.93 MG/DL (ref 0.51–0.95)
DEPRECATED HCO3 PLAS-SCNC: 27 MMOL/L (ref 22–29)
EGFRCR SERPLBLD CKD-EPI 2021: 66 ML/MIN/1.73M2
GLUCOSE SERPL-MCNC: 111 MG/DL (ref 70–99)
HBA1C MFR BLD: 5.8 % (ref 0–5.6)
HDLC SERPL-MCNC: 43 MG/DL
LDLC SERPL CALC-MCNC: 151 MG/DL
NONHDLC SERPL-MCNC: 178 MG/DL
POTASSIUM SERPL-SCNC: 4.6 MMOL/L (ref 3.4–5.3)
PROT SERPL-MCNC: 7.4 G/DL (ref 6.4–8.3)
SODIUM SERPL-SCNC: 141 MMOL/L (ref 135–145)
TRIGL SERPL-MCNC: 136 MG/DL

## 2023-11-10 PROCEDURE — 83036 HEMOGLOBIN GLYCOSYLATED A1C: CPT | Performed by: FAMILY MEDICINE

## 2023-11-10 PROCEDURE — G0439 PPPS, SUBSEQ VISIT: HCPCS | Performed by: FAMILY MEDICINE

## 2023-11-10 PROCEDURE — 99214 OFFICE O/P EST MOD 30 MIN: CPT | Mod: 25 | Performed by: FAMILY MEDICINE

## 2023-11-10 PROCEDURE — 36415 COLL VENOUS BLD VENIPUNCTURE: CPT | Performed by: FAMILY MEDICINE

## 2023-11-10 PROCEDURE — 80053 COMPREHEN METABOLIC PANEL: CPT | Performed by: FAMILY MEDICINE

## 2023-11-10 PROCEDURE — 80061 LIPID PANEL: CPT | Performed by: FAMILY MEDICINE

## 2023-11-10 RX ORDER — BETAMETHASONE DIPROPIONATE 0.5 MG/G
LOTION TOPICAL 2 TIMES DAILY
Qty: 60 ML | Refills: 4 | Status: SHIPPED | OUTPATIENT
Start: 2023-11-10

## 2023-11-10 RX ORDER — NAPROXEN 500 MG/1
TABLET ORAL
Qty: 60 TABLET | Refills: 5 | Status: SHIPPED | OUTPATIENT
Start: 2023-11-10

## 2023-11-10 RX ORDER — ESTRADIOL 0.5 MG/1
TABLET ORAL
Qty: 45 TABLET | Refills: 3 | Status: SHIPPED | OUTPATIENT
Start: 2023-11-10

## 2023-11-10 RX ORDER — LOSARTAN POTASSIUM AND HYDROCHLOROTHIAZIDE 12.5; 1 MG/1; MG/1
1 TABLET ORAL DAILY
Qty: 90 TABLET | Refills: 4 | Status: SHIPPED | OUTPATIENT
Start: 2023-11-10

## 2023-11-10 RX ORDER — CLOBETASOL PROPIONATE 0.5 MG/G
AEROSOL, FOAM TOPICAL
Qty: 50 G | Refills: 3 | Status: SHIPPED | OUTPATIENT
Start: 2023-11-10

## 2023-11-10 ASSESSMENT — ENCOUNTER SYMPTOMS
COUGH: 0
DIARRHEA: 0
FREQUENCY: 0
HEMATOCHEZIA: 0
SHORTNESS OF BREATH: 0
BREAST MASS: 0
NAUSEA: 0
DIZZINESS: 0
HEARTBURN: 0
NERVOUS/ANXIOUS: 0
PARESTHESIAS: 0
CONSTIPATION: 0
CHILLS: 0
ARTHRALGIAS: 0
EYE PAIN: 0
DYSURIA: 0
WEAKNESS: 0
JOINT SWELLING: 0
HEMATURIA: 0
MYALGIAS: 0
SORE THROAT: 0
PALPITATIONS: 0
ABDOMINAL PAIN: 0
FEVER: 0
HEADACHES: 0

## 2023-11-10 ASSESSMENT — ACTIVITIES OF DAILY LIVING (ADL): CURRENT_FUNCTION: NO ASSISTANCE NEEDED

## 2023-11-10 NOTE — PATIENT INSTRUCTIONS
Patient Education   Personalized Prevention Plan  You are due for the preventive services outlined below.  Your care team is available to assist you in scheduling these services.  If you have already completed any of these items, please share that information with your care team to update in your medical record.  Health Maintenance Due   Topic Date Due     ANNUAL REVIEW OF HM ORDERS  11/07/2023     Annual Wellness Visit  11/07/2023

## 2023-11-10 NOTE — PROGRESS NOTES
"SUBJECTIVE:   Ysabel is a 72 year old who presents for Preventive Visit.    Chief Complaints and History of Present Illnesses   Patient presents with    Wellness Visit     Fasting for labs.    Wrist Pain     Left wrist    Mass     Mass right under arm            11/10/2023     9:53 AM   Additional Questions   Roomed by Prachi LANE CMA   Accompanied by Self       Are you in the first 12 months of your Medicare coverage?  No    Mass  Pertinent negatives include no abdominal pain, arthralgias, chest pain, chills, congestion, coughing, fever, headaches, joint swelling, myalgias, nausea, rash, sore throat or weakness.   Healthy Habits:     In general, how would you rate your overall health?  Good    Frequency of exercise:  2-3 days/week    Duration of exercise:  15-30 minutes    Do you usually eat at least 4 servings of fruit and vegetables a day, include whole grains    & fiber and avoid regularly eating high fat or \"junk\" foods?  Yes    Taking medications regularly:  Yes    Medication side effects:  None    Ability to successfully perform activities of daily living:  No assistance needed    Home Safety:  No safety concerns identified    Hearing Impairment:  No hearing concerns    In the past 6 months, have you been bothered by leaking of urine?  No    In general, how would you rate your overall mental or emotional health?  Good    Additional concerns today:  No      HEALTH MAINTENANCE:   - mammo: Scheduled 11/15/23      ALBUTEROL: was given by Dr. Valentin, gets an annual bronchitis in the winter around January. Has on hand just in case. Hasn't used it, has just in case. No tobacco history.       CYMBALTA: Saw rheumatology for polyarthralgia, recommended to start Cymbalta. Didn't want to take it.      HOT FLASHES: Using oral estradiol half tablet 6 days a week. Hx hysterectomy. Has tried to wean, symptoms come back right away.        HYPERTENSION: Taking losartan hydrochlorothiazide 100-12.5 for BP management.  Will " occasionally check at home, well controlled.   BP Readings from Last 6 Encounters:   11/10/23 135/71   03/20/23 130/72   11/16/22 128/73   05/20/22 128/69   10/12/21 (!) 160/74   03/04/21 (!) 159/82       SCALP ECZEMA: topical betamethasone lotion, clobetasol foam. Gets behind ears and posterior scalp. Occasional flares.       THUMB ARTHRITIS: Naproxen as needed. Only as needed.       OSTEOPENIA: doing vitamin D supplementation.       DERM: Sees for skin checks       CT Scan: During covid, noticed a lump in her left anterior neck, took a while to get in to be seen. Referred to ENT. Was biopsied, indeterminate. Mass still present, not sure if changing. When presses on it, feels like voice changes. No new masses. No unexplained weight loss. Appetite has been okay.     A few times has also felt like she has an ear ache.     Also noticed a pea size lumped right posterior arm.     LEFT WRIST PAIN: Feeling like it is locking up some more, responded to the cortisone injection.     Today's PHQ-2 Score:       11/9/2023     6:34 PM   PHQ-2 ( 1999 [employer])   Q1: Little interest or pleasure in doing things 0   Q2: Feeling down, depressed or hopeless 0   PHQ-2 Score 0   Q1: Little interest or pleasure in doing things Not at all   Q2: Feeling down, depressed or hopeless Not at all   PHQ-2 Score 0       Have you ever done Advance Care Planning? (For example, a Health Directive, POLST, or a discussion with a medical provider or your loved ones about your wishes): No, advance care planning information given to patient to review.  Patient declined advance care planning discussion at this time.       Fall risk  Fallen 2 or more times in the past year?: No  Any fall with injury in the past year?: No    Cognitive Screening   1) Repeat 3 items (Leader, Season, Table)    2) Clock draw: NORMAL  3) 3 item recall: Recalls 3 objects  Results: NORMAL clock, 1-2 items recalled: COGNITIVE IMPAIRMENT LESS LIKELY    Mini-CogTM Tara Infante.  Licensed by the author for use in [facility]; reprinted with permission ([email or web address]). All rights reserved.          Reviewed and updated as needed this visit by clinical staff   Tobacco  Allergies  Meds              Reviewed and updated as needed this visit by Provider   Tobacco  Allergies  Meds  Problems  Med Hx  Surg Hx  Fam Hx         Social History     Tobacco Use    Smoking status: Never     Passive exposure: Never    Smokeless tobacco: Never   Substance Use Topics    Alcohol use: No             11/4/2023     1:54 PM   Alcohol Use   Prescreen: >3 drinks/day or >7 drinks/week? No     Do you have a current opioid prescription? No  Do you use any other controlled substances or medications that are not prescribed by a provider? None        Current providers sharing in care for this patient include:   Patient Care Team:  Sherlyn Tolentino DO as PCP - General (Family Medicine)  Millie Soto MBBS as Assigned Rheumatology Provider  Jenni Valentin MD as Assigned PCP    The following health maintenance items are reviewed in Epic and correct as of today:  Health Maintenance   Topic Date Due    ANNUAL REVIEW OF HM ORDERS  11/07/2023    MEDICARE ANNUAL WELLNESS VISIT  11/07/2023    FALL RISK ASSESSMENT  11/10/2024    MAMMO SCREENING  12/01/2024    DTAP/TDAP/TD IMMUNIZATION (2 - Td or Tdap) 11/17/2025    LIPID  11/07/2027    ADVANCE CARE PLANNING  11/16/2027    COLORECTAL CANCER SCREENING  08/06/2030    DEXA  10/29/2035    HEPATITIS C SCREENING  Completed    PHQ-2 (once per calendar year)  Completed    INFLUENZA VACCINE  Completed    Pneumococcal Vaccine: 65+ Years  Completed    ZOSTER IMMUNIZATION  Completed    RSV VACCINE (Pregnancy & 60+)  Completed    COVID-19 Vaccine  Completed    IPV IMMUNIZATION  Aged Out    HPV IMMUNIZATION  Aged Out    MENINGITIS IMMUNIZATION  Aged Out    RSV MONOCLONAL ANTIBODY  Aged Out       Review of Systems   Constitutional:  Negative for chills and fever.   HENT:   "Negative for congestion, ear pain, hearing loss and sore throat.    Eyes:  Negative for pain and visual disturbance.   Respiratory:  Negative for cough and shortness of breath.    Cardiovascular:  Negative for chest pain, palpitations and peripheral edema.   Gastrointestinal:  Negative for abdominal pain, constipation, diarrhea, heartburn, hematochezia and nausea.   Breasts:  Negative for tenderness, breast mass and discharge.   Genitourinary:  Negative for dysuria, frequency, genital sores, hematuria, pelvic pain, urgency, vaginal bleeding and vaginal discharge.   Musculoskeletal:  Negative for arthralgias, joint swelling and myalgias.   Skin:  Negative for rash.   Neurological:  Negative for dizziness, weakness, headaches and paresthesias.   Psychiatric/Behavioral:  Negative for mood changes. The patient is not nervous/anxious.        OBJECTIVE:   /71 (BP Location: Left arm, Patient Position: Sitting, Cuff Size: Adult Regular)   Pulse 73   Temp 98  F (36.7  C) (Oral)   Resp 16   Ht 1.594 m (5' 2.75\")   Wt 71.3 kg (157 lb 4 oz)   SpO2 100%   BMI 28.08 kg/m   Estimated body mass index is 28.08 kg/m  as calculated from the following:    Height as of this encounter: 1.594 m (5' 2.75\").    Weight as of this encounter: 71.3 kg (157 lb 4 oz).  Physical Exam  GENERAL: healthy, alert and no distress  EYES: Eyes grossly normal to inspection, PERRL and conjunctivae and sclerae normal  HENT: ear canals and TM's normal, nose and mouth without ulcers or lesions  NECK: no adenopathy, no asymmetry, masses, or scars and thyroid normal to palpation  RESP: lungs clear to auscultation - no rales, rhonchi or wheezes  CV: regular rate and rhythm, normal S1 S2, no S3 or S4, no murmur, click or rub, no peripheral edema and peripheral pulses strong  ABDOMEN: soft, nontender, no hepatosplenomegaly, no masses and bowel sounds normal  MS: no gross musculoskeletal defects noted, no edema  SKIN: no suspicious lesions or " rashes  NEURO: Normal strength and tone, mentation intact and speech normal  PSYCH: mentation appears normal, affect normal/bright        ASSESSMENT / PLAN:     1. Encounter for Medicare annual wellness exam  Reviewed health history and health maintenance recommendations.    2. Hot flashes  - estradiol (ESTRACE) 0.5 MG tablet; Take 1/2 (one-half) tablet by mouth once daily  Dispense: 45 tablet; Refill: 3    Chronic estrogen use, on low-dose replacement, managing symptoms adequately.  Unable to wean off as she has significant recurrence of hot flash symptoms.  Status post hysterectomy, does not need endometrial protection.  We will continue, reassess in a year.     3. Essential hypertension  - losartan-hydrochlorothiazide (HYZAAR) 100-12.5 MG tablet; Take 1 tablet by mouth daily  Dispense: 90 tablet; Refill: 4  - Comprehensive metabolic panel (BMP + Alb, Alk Phos, ALT, AST, Total. Bili, TP)    Blood pressure is at goal.  Monitoring labs today.  Continue current treatment plan.    4. Eczema of scalp  - betamethasone dipropionate (DIPROSONE) 0.05 % external lotion; Apply topically 2 times daily Topically to scalp and behind ears as directed  Dispense: 60 mL; Refill: 4  - clobetasol propionate (OLUX) 0.05 % external foam; Apply foam to scalp and behind ear once to twice daily as needed for flare ups up to 2 weeks at a time  Dispense: 50 g; Refill: 3    Intermittent flares.  Symptoms respond to topical steroids.  Refill sent to pharmacy.    5. Degenerative arthritis of thumb, left  - naproxen (NAPROSYN) 500 MG tablet; TAKE 1 TABLET BY MOUTH TWICE DAILY WITH MEALS AS NEEDED FOR PAIN  Dispense: 60 tablet; Refill: 5    Referred to rheumatology who did not suspect underlying autoimmune process.  Previously saw orthopedics, status post steroid injection.  Recommend more consistent use of the NSAID.  If symptoms not adequately controlled, follow-up with orthopedics.    6. Osteopenia, unspecified location  7. Asymptomatic  "postmenopausal status  - DX Hip/Pelvis/Spine; Future  - Comprehensive metabolic panel (BMP + Alb, Alk Phos, ALT, AST, Total. Bili, TP)    Due for surveillance DEXA scan.  Continue calcium and vitamin D intake/supplementation, weightbearing exercises, avoiding any trip or fall hazards.    8. Left cervical lymphadenopathy  9. Neck mass  - US Head Neck Soft Tissue; Future    Previous work-up under Dr. Valentin.  Was referred to ENT who suspected more of a benign process.  Lesion has not really grown, however patient noticed Dr. Valentin had planned a repeat image in 6 months which is overdue.  No significant clinical change since last evaluation, we will plan for updated ultrasound today to reevaluate lesion.    10. Left wrist pain  Offered left wrist splint.  I suspect she either has some mild tendinitis or some wrist arthritis that is worsening.  NSAIDs per above.  Patient declined wrist splint today.  Follow-up with orthopedics if symptoms worsening.  We could also trial hand therapy.    11. Prediabetes  - Comprehensive metabolic panel (BMP + Alb, Alk Phos, ALT, AST, Total. Bili, TP)  - Hemoglobin A1c    Continue healthy lifestyle modifications.  Evaluate for progression to diabetes.    12. Lipid screening  - Comprehensive metabolic panel (BMP + Alb, Alk Phos, ALT, AST, Total. Bili, TP)  - Lipid panel reflex to direct LDL Fasting      Continue healthy lifestyle modifications.    Patient has been advised of split billing requirements and indicates understanding: Yes      COUNSELING:  Reviewed preventive health counseling, as reflected in patient instructions      BMI:   Estimated body mass index is 28.08 kg/m  as calculated from the following:    Height as of this encounter: 1.594 m (5' 2.75\").    Weight as of this encounter: 71.3 kg (157 lb 4 oz).         She reports that she has never smoked. She has never been exposed to tobacco smoke. She has never used smokeless tobacco.      Appropriate preventive services were " discussed with this patient, including applicable screening as appropriate for fall prevention, nutrition, physical activity, Tobacco-use cessation, weight loss and cognition.  Checklist reviewing preventive services available has been given to the patient.    Reviewed patients plan of care and provided an AVS. The Intermediate Care Plan ( asthma action plan, low back pain action plan, and migraine action plan) for Yennifer meets the Care Plan requirement. This Care Plan has been established and reviewed with the Patient.          Pamela Awan [facility]    Identified Health Risks:  I have reviewed Opioid Use Disorder and Substance Use Disorder risk factors and made any needed referrals.

## 2023-11-14 ENCOUNTER — HOSPITAL ENCOUNTER (OUTPATIENT)
Dept: ULTRASOUND IMAGING | Facility: HOSPITAL | Age: 73
Discharge: HOME | End: 2023-11-14
Attending: FAMILY MEDICINE | Admitting: FAMILY MEDICINE
Payer: COMMERCIAL

## 2023-11-14 ENCOUNTER — MYC MEDICAL ADVICE (OUTPATIENT)
Dept: FAMILY MEDICINE | Facility: CLINIC | Age: 73
End: 2023-11-14
Payer: COMMERCIAL

## 2023-11-14 DIAGNOSIS — R22.1 NECK MASS: ICD-10-CM

## 2023-11-14 DIAGNOSIS — E78.5 HYPERLIPIDEMIA, UNSPECIFIED HYPERLIPIDEMIA TYPE: Primary | ICD-10-CM

## 2023-11-14 PROCEDURE — 76536 US EXAM OF HEAD AND NECK: CPT

## 2023-11-14 RX ORDER — EZETIMIBE 10 MG/1
10 TABLET ORAL DAILY
Qty: 90 TABLET | Refills: 3 | Status: SHIPPED | OUTPATIENT
Start: 2023-11-14

## 2023-11-14 NOTE — RESULT ENCOUNTER NOTE
The 10-year ASCVD risk score (Ben MONTGOMERY, et al., 2019) is: 15%  Patient updated by Kaseya message with lab results.       Inder Grady,  Your labs have all returned.   1. Your cholesterol labs remain elevated. With your history of a bad reaction to atorvastatin, we likely want to try and avoid statins. Have you ever tried Zetia (ezetimibe)? I think given your elevated ASCVD risk (estimated risk of a heart attack or stroke), it would be worth a trial of a different class of medication to help manage your cholesterol and lower your risk.  2. Your A1c remains stable in the lower prediabetes range. Keep working on a heart healthy and nutrient dense diet, regular physical activity, and weight management to help lower your risk of progression to diabetes.  Reach out by Kaseya with follow up questions or concerns and let me know your thoughts on a trial of Zetia.  Pamela Awan, DO

## 2023-11-14 NOTE — TELEPHONE ENCOUNTER
1. Hyperlipidemia, unspecified hyperlipidemia type  - ezetimibe (ZETIA) 10 MG tablet; Take 1 tablet (10 mg) by mouth daily  Dispense: 90 tablet; Refill: 3     Pamela Awan DO

## 2023-11-15 NOTE — RESULT ENCOUNTER NOTE
Patient updated by [employer] message with imaging results.       Inder Grady,  The ultrasound has returned and shows that the spot in your left neck is stable from last check.  Given that it has not changed, I think okay to continue to monitor.  If you feel that this is getting larger or more painful, we can reevaluate or follow-up with ENT.  Pamela Awan, DO

## 2023-12-04 ENCOUNTER — HOSPITAL ENCOUNTER (OUTPATIENT)
Dept: MAMMOGRAPHY | Facility: CLINIC | Age: 73
Discharge: HOME | End: 2023-12-04
Attending: FAMILY MEDICINE | Admitting: FAMILY MEDICINE
Payer: COMMERCIAL

## 2023-12-04 DIAGNOSIS — Z12.31 VISIT FOR SCREENING MAMMOGRAM: ICD-10-CM

## 2023-12-04 PROCEDURE — 77067 SCR MAMMO BI INCL CAD: CPT

## 2023-12-06 ENCOUNTER — HOSPITAL ENCOUNTER (OUTPATIENT)
Dept: BONE DENSITY | Facility: HOSPITAL | Age: 73
Discharge: HOME | End: 2023-12-06
Attending: FAMILY MEDICINE | Admitting: FAMILY MEDICINE
Payer: COMMERCIAL

## 2023-12-06 DIAGNOSIS — Z78.0 ASYMPTOMATIC POSTMENOPAUSAL STATUS: ICD-10-CM

## 2023-12-06 DIAGNOSIS — M85.80 OSTEOPENIA, UNSPECIFIED LOCATION: ICD-10-CM

## 2023-12-06 PROCEDURE — 77080 DXA BONE DENSITY AXIAL: CPT

## 2023-12-09 NOTE — RESULT ENCOUNTER NOTE
Patient updated by [employer] message with lab results.       Serenajoshua Matthewshy,  Bone density scan consistent with osteopenia (low bone density) but not in the treatment range. Continue calcium and vitamin D supplementation, weight bearing exercise, and avoiding any trip or fall hazards.  I would recommend we recheck bone density screening in 3-5 years.  Please reach out with follow up questions or concerns.   Pamela Awan, DO

## 2024-02-12 ENCOUNTER — NURSE TRIAGE (OUTPATIENT)
Dept: NURSING | Facility: CLINIC | Age: 74
End: 2024-02-12
Payer: COMMERCIAL

## 2024-02-12 NOTE — TELEPHONE ENCOUNTER
Nurse Triage SBAR    Is this a 2nd Level Triage? YES, LICENSED PRACTITIONER REVIEW IS REQUIRED    Situation: States she has Hx of side effects from cholesterol medications  Pt is due to  refill of RX, ezetimibe 10 mg that she began taking in mid November, 2023  Pt is wondering if she should keep taking, stating she thinks this one may be causing her even more side effects.    Background: reports that past side effects were only upper arm soreness.    Assessment: Now having lower back pain, especially bad the last 2 weeks  Very dark urine  Foul smelling    Protocol Recommended Disposition: Be seen in office today to R/O UTI      Recommendation: No appts available and Pt states she will not go to .  Please advise Pt at [phone] if can/should be seen in clinic today.  A detailed msg may be left on VM.     Routed to provider    Does the patient meet one of the following criteria for ADS visit consideration? 16+ years old, with an [facility] PCP     TIP  Providers, please consider if this condition is appropriate for management at one of our Acute and Diagnostic Services sites.     If patient is a good candidate, please use dotphrase <dot>triageresponse and select Refer to ADS to document.            Reason for Disposition   Bad or foul-smelling urine    Additional Information   Negative: Shock suspected (e.g., cold/pale/clammy skin, too weak to stand, low BP, rapid pulse)   Negative: Sounds like a life-threatening emergency to the triager   Negative: Followed a female genital area injury (e.g., labia, vagina, vulva)   Negative: Followed a male genital area injury (penis, scrotum)   Negative: Vaginal discharge   Negative: Pus (white, yellow) or bloody discharge from end of penis   Negative: Pain or burning with passing urine (urination) and pregnant   Negative: Pain or burning with passing urine (urination) and female   Negative: Pain or burning with passing urine (urination) and male   Negative: Pain or itching  in the vulvar area   Negative: Pain in scrotum is main symptom   Negative: Blood in the urine is main symptom   Negative: Symptoms arising from use of a urinary catheter (e.g., Coude, Singleton)   Negative: Unable to urinate (or only a few drops) > 4 hours and bladder feels very full (e.g., palpable bladder or strong urge to urinate)   Negative: Decreased urination and drinking very little and dehydration suspected (e.g., dark urine, no urine > 12 hours, very dry mouth, very lightheaded)   Negative: Patient sounds very sick or weak to the triager   Negative: Fever > 100.4 F  (38.0 C)   Negative: Side (flank) or lower back pain present    Protocols used: Urinary Symptoms-A-OH

## 2024-02-14 ENCOUNTER — OFFICE VISIT (OUTPATIENT)
Dept: FAMILY MEDICINE | Facility: CLINIC | Age: 74
End: 2024-02-14
Payer: COMMERCIAL

## 2024-02-14 VITALS
WEIGHT: 161.4 LBS | OXYGEN SATURATION: 100 % | SYSTOLIC BLOOD PRESSURE: 124 MMHG | RESPIRATION RATE: 16 BRPM | HEART RATE: 70 BPM | DIASTOLIC BLOOD PRESSURE: 73 MMHG | BODY MASS INDEX: 28.6 KG/M2 | TEMPERATURE: 97.8 F | HEIGHT: 63 IN

## 2024-02-14 DIAGNOSIS — R82.90 ABNORMAL URINE ODOR: Primary | ICD-10-CM

## 2024-02-14 DIAGNOSIS — M79.10 MYALGIA: ICD-10-CM

## 2024-02-14 DIAGNOSIS — E78.5 HYPERLIPIDEMIA, UNSPECIFIED HYPERLIPIDEMIA TYPE: ICD-10-CM

## 2024-02-14 DIAGNOSIS — R35.0 URINE FREQUENCY: ICD-10-CM

## 2024-02-14 LAB
ALBUMIN UR-MCNC: NEGATIVE MG/DL
APPEARANCE UR: CLEAR
BACTERIA #/AREA URNS HPF: ABNORMAL /HPF
BILIRUB UR QL STRIP: NEGATIVE
COLOR UR AUTO: YELLOW
GLUCOSE UR STRIP-MCNC: NEGATIVE MG/DL
HGB UR QL STRIP: NEGATIVE
HYALINE CASTS #/AREA URNS LPF: ABNORMAL /LPF
KETONES UR STRIP-MCNC: NEGATIVE MG/DL
LEUKOCYTE ESTERASE UR QL STRIP: ABNORMAL
NITRATE UR QL: NEGATIVE
PH UR STRIP: 7 [PH] (ref 5–8)
RBC #/AREA URNS AUTO: ABNORMAL /HPF
SP GR UR STRIP: 1.02 (ref 1–1.03)
SQUAMOUS #/AREA URNS AUTO: ABNORMAL /LPF
TRANS CELLS #/AREA URNS HPF: ABNORMAL /HPF
UROBILINOGEN UR STRIP-ACNC: 0.2 E.U./DL
WBC #/AREA URNS AUTO: ABNORMAL /HPF

## 2024-02-14 PROCEDURE — 81001 URINALYSIS AUTO W/SCOPE: CPT | Performed by: FAMILY MEDICINE

## 2024-02-14 PROCEDURE — 82550 ASSAY OF CK (CPK): CPT | Performed by: FAMILY MEDICINE

## 2024-02-14 PROCEDURE — 99213 OFFICE O/P EST LOW 20 MIN: CPT | Performed by: FAMILY MEDICINE

## 2024-02-14 PROCEDURE — 82248 BILIRUBIN DIRECT: CPT | Performed by: FAMILY MEDICINE

## 2024-02-14 PROCEDURE — 36415 COLL VENOUS BLD VENIPUNCTURE: CPT | Performed by: FAMILY MEDICINE

## 2024-02-14 PROCEDURE — 80061 LIPID PANEL: CPT | Performed by: FAMILY MEDICINE

## 2024-02-14 PROCEDURE — 80053 COMPREHEN METABOLIC PANEL: CPT | Performed by: FAMILY MEDICINE

## 2024-02-14 NOTE — PROGRESS NOTES
Assessment & Plan     Abnormal urine odor  Urine frequency    Urinalysis is negative for an obvious infection  Recommend increased fluids. Urine shows hyaline casts which can be seen in dehydration  Consider recheck of the urinalysis in the future    - UA with Microscopic reflex to Culture - Clinic Collect  - UA Microscopic with Reflex to Culture  - Basic metabolic panel; Future  - Basic metabolic panel    Myalgia    May be secondary to Zetia  To proceed cannot tolerate a statin  Will check a total CK  Recommend a trial of holding Zetia to see if her symptoms resolve  She will hold Zetia over the next 3 to 4 weeks and then follow-up with Dr. Tolentino to discuss whether to restart the medication or consider other options    - CK total; Future  - CK total    Hyperlipidemia, unspecified hyperlipidemia type    Check a cascade hepatic profile to see how she has responded to Zetia  She will continue to work on dietary lifestyle measures    - Lipid panel reflex to direct LDL Fasting; Future  - Hepatic function panel; Future  - Lipid panel reflex to direct LDL Fasting  - Hepatic function panel                  Catie Grady is a 73 year old, presenting for the following health issues:  Rule out Urinary Tract Infection (Urine is darker and has an odor, having back pain ), Medication Problem (Having muscle aches and back pain after going on statin medication.), and Recheck Medication        2/14/2024     1:57 PM   Additional Questions   Roomed by Meghan VERMA CMA     This is a pleasant 73-year-old female, patient of Dr. Tolentino's, who presents to the clinic as she has had a recent constellation of symptoms.  She has noted a change in the color of her urine with some frequency.  She has felt achy in various parts of her body including her arm and legs.  She has a history of hypercholesterolemia and previously did not tolerate atorvastatin.  She therefore was switched to Zetia.  She started medication and has experiencing the  "symptoms.  She wonders whether this is a side effect or whether she has a urinary tract infection.  She denies fever or chills.  He admits she drinks  a lot of iced tea.  Denies nausea or vomiting.    History of Present Illness       Reason for visit:  Whether I should continue taking cholesterol medication    She eats 2-3 servings of fruits and vegetables daily.She consumes 0 sweetened beverage(s) daily.She exercises with enough effort to increase her heart rate 20 to 29 minutes per day.  She exercises with enough effort to increase her heart rate 5 days per week.   She is taking medications regularly.                     Objective    BP (!) 154/78 (BP Location: Left arm, Patient Position: Sitting, Cuff Size: Adult Regular)   Pulse 70   Temp 97.8  F (36.6  C) (Oral)   Resp 16   Ht 1.594 m (5' 2.75\")   Wt 73.2 kg (161 lb 6.4 oz)   SpO2 100%   BMI 28.82 kg/m    Body mass index is 28.82 kg/m .  Physical Exam   GENERAL: alert and no distress  EYES: Eyes grossly normal to inspection, PERRL and conjunctivae and sclerae normal  RESP: lungs clear to auscultation - no rales, rhonchi or wheezes  CV: regular rate and rhythm, normal S1 S2, no S3 or S4, no murmur, click or rub, no peripheral edema  MS: no gross musculoskeletal defects noted, no edema  PSYCH: mentation appears normal, affect normal/bright            Signed Electronically by: Gil Alva MD    "

## 2024-02-14 NOTE — PATIENT INSTRUCTIONS
Ysabel,    Your urine test does not show an obvious infection or blood  Make sure to drink plenty of water  Remember that caffeine can be a bladder irritant  We will check your laboratory testing as discussed  You may hold your Zetia over the next 4 weeks  I recommend some type of follow-up with Dr. Tolentino  at that time  If you develop worsening symptoms then make a follow-up appointment when you are able    Gil Alva MD

## 2024-02-15 LAB
ALBUMIN SERPL BCG-MCNC: 4.4 G/DL (ref 3.5–5.2)
ALP SERPL-CCNC: 68 U/L (ref 40–150)
ALT SERPL W P-5'-P-CCNC: 14 U/L (ref 0–50)
ANION GAP SERPL CALCULATED.3IONS-SCNC: 11 MMOL/L (ref 7–15)
AST SERPL W P-5'-P-CCNC: 20 U/L (ref 0–45)
BILIRUB DIRECT SERPL-MCNC: <0.2 MG/DL (ref 0–0.3)
BILIRUB SERPL-MCNC: 0.5 MG/DL
BUN SERPL-MCNC: 19.9 MG/DL (ref 8–23)
CALCIUM SERPL-MCNC: 9.7 MG/DL (ref 8.8–10.2)
CHLORIDE SERPL-SCNC: 106 MMOL/L (ref 98–107)
CHOLEST SERPL-MCNC: 171 MG/DL
CK SERPL-CCNC: 63 U/L (ref 26–192)
CREAT SERPL-MCNC: 0.8 MG/DL (ref 0.51–0.95)
DEPRECATED HCO3 PLAS-SCNC: 28 MMOL/L (ref 22–29)
EGFRCR SERPLBLD CKD-EPI 2021: 78 ML/MIN/1.73M2
FASTING STATUS PATIENT QL REPORTED: YES
GLUCOSE SERPL-MCNC: 105 MG/DL (ref 70–99)
HDLC SERPL-MCNC: 50 MG/DL
LDLC SERPL CALC-MCNC: 100 MG/DL
NONHDLC SERPL-MCNC: 121 MG/DL
POTASSIUM SERPL-SCNC: 4.2 MMOL/L (ref 3.4–5.3)
PROT SERPL-MCNC: 7.1 G/DL (ref 6.4–8.3)
SODIUM SERPL-SCNC: 145 MMOL/L (ref 135–145)
TRIGL SERPL-MCNC: 103 MG/DL

## 2024-05-14 ENCOUNTER — OFFICE VISIT (OUTPATIENT)
Dept: FAMILY MEDICINE | Facility: CLINIC | Age: 74
End: 2024-05-14
Payer: COMMERCIAL

## 2024-05-14 VITALS
TEMPERATURE: 97.8 F | SYSTOLIC BLOOD PRESSURE: 135 MMHG | HEIGHT: 63 IN | HEART RATE: 68 BPM | BODY MASS INDEX: 28.53 KG/M2 | WEIGHT: 161 LBS | DIASTOLIC BLOOD PRESSURE: 68 MMHG | RESPIRATION RATE: 16 BRPM | OXYGEN SATURATION: 100 %

## 2024-05-14 DIAGNOSIS — M85.88 OSTEOPENIA OF LUMBAR SPINE: ICD-10-CM

## 2024-05-14 DIAGNOSIS — E78.2 MIXED HYPERLIPIDEMIA: Primary | ICD-10-CM

## 2024-05-14 PROCEDURE — G2211 COMPLEX E/M VISIT ADD ON: HCPCS | Performed by: FAMILY MEDICINE

## 2024-05-14 PROCEDURE — 99214 OFFICE O/P EST MOD 30 MIN: CPT | Performed by: FAMILY MEDICINE

## 2024-05-14 NOTE — PROGRESS NOTES
Assessment & Plan     Mixed hyperlipidemia  Presented in February with myalgias and abnormal urine color.  Patient concerned this was secondary to Zetia though workup at that time was reassuring with contaminated UA without signs of infection, normal creatinine/LFTs/CK. We reviewed potential risk/benefits of restarting the Zetia and she plans to restart it in the near future.  She will notify us if any concerns that arise.    Osteopenia of lumbar spine  Reviewed 12/2023 DEXA which showed osteopenia.  Recommend weightbearing exercise and continuing with vitamin D3 2000 IUs daily.    30 minutes spent by me on the date of the encounter doing chart review, review of test results, interpretation of tests, patient visit, and documentation     The longitudinal plan of care for the diagnosis(es)/condition(s) as documented were addressed during this visit. Due to the added complexity in care, I will continue to support Ysabel in the subsequent management and with ongoing continuity of care.    Subjective   Ysabel is a 73 year old, presenting for the following health issues:  Recheck Medication (Questions regarding Ezetimibe )        5/14/2024    10:31 AM   Additional Questions   Roomed by CHRISTINE Zuniga CMA   Accompanied by -     History of Present Illness       Reason for visit:  Whether I should continue to take Ezetimibe to manage my cholesterol    She eats 2-3 servings of fruits and vegetables daily.She consumes 0 sweetened beverage(s) daily.She exercises with enough effort to increase her heart rate 20 to 29 minutes per day.  She exercises with enough effort to increase her heart rate 4 days per week.      Was seen in February for urinary frequency and myalgias.  Thought may be secondary to Zetia so she held this to monitor for response.  Normal CK level at that time.  Has been unable to tolerate statins in the past due to myalgias.    Feels myalgias have not significantly changed.  To get a new mattress and notes back and  "neck pain related to this.  Waiting on new mattress now.  Also has been doing more physical activity so biceps remains sore.  Interested in seeing the improvement of her cholesterol levels.  When she was not on the Zetia versus when she was.    Urine is no longer dark or smelly.  Brother with history of solitary kidney and kidney disease so concerned her creatinine would have been affected.    Wondering if she is on the appropriate dose of vitamin D3.  Has had a DEXA which showed osteopenia.      Objective    /68   Pulse 68   Temp 97.8  F (36.6  C)   Resp 16   Ht 1.594 m (5' 2.75\")   Wt 73 kg (161 lb)   SpO2 100%   BMI 28.75 kg/m    Body mass index is 28.75 kg/m .  Physical Exam   GENERAL: alert and no distress  RESP: lungs clear to auscultation - no rales, rhonchi or wheezes  CV: regular rate and rhythm, normal S1 S2, no S3 or S4, no murmur  PSYCH: mentation appears normal, affect normal/bright          Signed Electronically by: Sherlyn Tolentino,     "

## 2024-05-28 ENCOUNTER — TRANSFERRED RECORDS (OUTPATIENT)
Dept: HEALTH INFORMATION MANAGEMENT | Facility: CLINIC | Age: 74
End: 2024-05-28
Payer: COMMERCIAL

## 2024-11-07 SDOH — HEALTH STABILITY: PHYSICAL HEALTH: ON AVERAGE, HOW MANY MINUTES DO YOU ENGAGE IN EXERCISE AT THIS LEVEL?: 30 MIN

## 2024-11-07 SDOH — HEALTH STABILITY: PHYSICAL HEALTH: ON AVERAGE, HOW MANY DAYS PER WEEK DO YOU ENGAGE IN MODERATE TO STRENUOUS EXERCISE (LIKE A BRISK WALK)?: 3 DAYS

## 2024-11-07 ASSESSMENT — SOCIAL DETERMINANTS OF HEALTH (SDOH): HOW OFTEN DO YOU GET TOGETHER WITH FRIENDS OR RELATIVES?: TWICE A WEEK

## 2024-11-12 ENCOUNTER — OFFICE VISIT (OUTPATIENT)
Dept: FAMILY MEDICINE | Facility: CLINIC | Age: 74
End: 2024-11-12
Payer: COMMERCIAL

## 2024-11-12 VITALS
HEIGHT: 63 IN | WEIGHT: 155.8 LBS | SYSTOLIC BLOOD PRESSURE: 135 MMHG | DIASTOLIC BLOOD PRESSURE: 73 MMHG | RESPIRATION RATE: 16 BRPM | OXYGEN SATURATION: 100 % | BODY MASS INDEX: 27.61 KG/M2 | HEART RATE: 78 BPM

## 2024-11-12 DIAGNOSIS — I10 ESSENTIAL HYPERTENSION: ICD-10-CM

## 2024-11-12 DIAGNOSIS — Z00.00 ENCOUNTER FOR MEDICARE ANNUAL WELLNESS EXAM: Primary | ICD-10-CM

## 2024-11-12 DIAGNOSIS — R73.03 PREDIABETES: ICD-10-CM

## 2024-11-12 DIAGNOSIS — E78.5 HYPERLIPIDEMIA, UNSPECIFIED HYPERLIPIDEMIA TYPE: ICD-10-CM

## 2024-11-12 DIAGNOSIS — M19.90 ARTHRITIS: ICD-10-CM

## 2024-11-12 DIAGNOSIS — Z12.11 SCREEN FOR COLON CANCER: ICD-10-CM

## 2024-11-12 LAB
ALBUMIN SERPL BCG-MCNC: 4.3 G/DL (ref 3.5–5.2)
ALP SERPL-CCNC: 67 U/L (ref 40–150)
ALT SERPL W P-5'-P-CCNC: 12 U/L (ref 0–50)
ANION GAP SERPL CALCULATED.3IONS-SCNC: 10 MMOL/L (ref 7–15)
AST SERPL W P-5'-P-CCNC: 20 U/L (ref 0–45)
BILIRUB SERPL-MCNC: 0.6 MG/DL
BUN SERPL-MCNC: 19 MG/DL (ref 8–23)
CALCIUM SERPL-MCNC: 9.9 MG/DL (ref 8.8–10.4)
CHLORIDE SERPL-SCNC: 103 MMOL/L (ref 98–107)
CHOLEST SERPL-MCNC: 180 MG/DL
CREAT SERPL-MCNC: 0.8 MG/DL (ref 0.51–0.95)
EGFRCR SERPLBLD CKD-EPI 2021: 78 ML/MIN/1.73M2
ERYTHROCYTE [DISTWIDTH] IN BLOOD BY AUTOMATED COUNT: 13.2 % (ref 10–15)
FASTING STATUS PATIENT QL REPORTED: YES
FASTING STATUS PATIENT QL REPORTED: YES
GLUCOSE SERPL-MCNC: 113 MG/DL (ref 70–99)
HCO3 SERPL-SCNC: 28 MMOL/L (ref 22–29)
HCT VFR BLD AUTO: 40 % (ref 35–47)
HDLC SERPL-MCNC: 52 MG/DL
HGB BLD-MCNC: 13 G/DL (ref 11.7–15.7)
LDLC SERPL CALC-MCNC: 112 MG/DL
MCH RBC QN AUTO: 29.5 PG (ref 26.5–33)
MCHC RBC AUTO-ENTMCNC: 32.5 G/DL (ref 31.5–36.5)
MCV RBC AUTO: 91 FL (ref 78–100)
NONHDLC SERPL-MCNC: 128 MG/DL
PLATELET # BLD AUTO: 213 10E3/UL (ref 150–450)
POTASSIUM SERPL-SCNC: 3.6 MMOL/L (ref 3.4–5.3)
PROT SERPL-MCNC: 7.2 G/DL (ref 6.4–8.3)
RBC # BLD AUTO: 4.4 10E6/UL (ref 3.8–5.2)
SODIUM SERPL-SCNC: 141 MMOL/L (ref 135–145)
TRIGL SERPL-MCNC: 79 MG/DL
WBC # BLD AUTO: 5.2 10E3/UL (ref 4–11)

## 2024-11-12 PROCEDURE — G0439 PPPS, SUBSEQ VISIT: HCPCS | Performed by: FAMILY MEDICINE

## 2024-11-12 PROCEDURE — 36415 COLL VENOUS BLD VENIPUNCTURE: CPT | Performed by: FAMILY MEDICINE

## 2024-11-12 PROCEDURE — 99214 OFFICE O/P EST MOD 30 MIN: CPT | Mod: 25 | Performed by: FAMILY MEDICINE

## 2024-11-12 PROCEDURE — 83036 HEMOGLOBIN GLYCOSYLATED A1C: CPT | Performed by: FAMILY MEDICINE

## 2024-11-12 PROCEDURE — 80061 LIPID PANEL: CPT | Performed by: FAMILY MEDICINE

## 2024-11-12 PROCEDURE — 80053 COMPREHEN METABOLIC PANEL: CPT | Performed by: FAMILY MEDICINE

## 2024-11-12 PROCEDURE — 85027 COMPLETE CBC AUTOMATED: CPT | Performed by: FAMILY MEDICINE

## 2024-11-12 RX ORDER — LOSARTAN POTASSIUM AND HYDROCHLOROTHIAZIDE 12.5; 1 MG/1; MG/1
1 TABLET ORAL DAILY
Qty: 90 TABLET | Refills: 3 | Status: SHIPPED | OUTPATIENT
Start: 2024-11-12

## 2024-11-12 RX ORDER — ACETAMINOPHEN 500 MG
500-1000 TABLET ORAL DAILY
COMMUNITY
Start: 2024-11-12

## 2024-11-12 RX ORDER — EZETIMIBE 10 MG/1
10 TABLET ORAL DAILY
Qty: 90 TABLET | Refills: 3 | Status: SHIPPED | OUTPATIENT
Start: 2024-11-12

## 2024-11-12 NOTE — PROGRESS NOTES
Preventive Care Visit  [facility]  Sherlyn Tolentino DO, Family Medicine  Nov 12, 2024      Assessment & Plan     Encounter for Medicare annual wellness exam  Counseling  Appropriate preventive services were addressed with this patient via screening, questionnaire, or discussion as appropriate for fall prevention, nutrition, physical activity, social engagement, weight loss and cognition.  Checklist reviewing preventive services available has been given to the patient.  Reviewed patient's diet, addressing concerns and/or questions.   She is at risk for lack of exercise and has been provided with information to increase physical activity for the benefit of her well-being.   She is at risk for psychosocial distress and has been provided with information to reduce risk.   - CBC with platelets; Future    Arthritis  Various arthritic symptoms of both large and small joints.  Discussed okay to use Tylenol daily with NSAIDs as needed.  She will let us know if interested in orthopedic referral for specific areas of concern.  - acetaminophen (TYLENOL) 500 MG tablet; Take 1-2 tablets (500-1,000 mg) by mouth daily.    Hyperlipidemia, unspecified hyperlipidemia type  Reassess fasting labs on ezetimibe.  Prior intolerance to statins.  - Comprehensive metabolic panel (BMP + Alb, Alk Phos, ALT, AST, Total. Bili, TP); Future  - Lipid panel; Future  - ezetimibe (ZETIA) 10 MG tablet; Take 1 tablet (10 mg) by mouth daily.    Essential hypertension  Well-controlled on losartan-hydrochlorothiazide.  Update monitoring labs today.  Refills provided.  - losartan-hydrochlorothiazide (HYZAAR) 100-12.5 MG tablet; Take 1 tablet by mouth daily.    Screen for colon cancer  Due for repeat 8/2025, referral placed  - Colonoscopy Screening  Referral; Future    Patient has been advised of split billing requirements and indicates understanding: Yes        BMI  Estimated body mass index is 27.82 kg/m  as calculated  "from the following:    Height as of this encounter: 1.594 m (5' 2.75\").    Weight as of this encounter: 70.7 kg (155 lb 12.8 oz).   Weight management plan: Discussed healthy diet and exercise guidelines      Subjective   Ysabel is a 73 year old, presenting for the following:  Wellness Visit (Pt is fasting today, no concerns today. )        11/12/2024     7:31 AM   Additional Questions   Roomed by Fauzia Shea CMA           HPI    Various aches and pains including left ankle, bilateral great toe MTP, occasional left hip, occasional low back and left side, biceps particularly with pulling sweatshirts off and on.  Uses Tylenol infrequently.  Typically wears crocs in the home and sketchers out and about.  May get about 20 minutes of activity per day.    Health Care Directive  Patient does not have a Health Care Directive: Discussed advance care planning with patient; information given to patient to review.      11/7/2024   General Health   How would you rate your overall physical health? Good   Feel stress (tense, anxious, or unable to sleep) Only a little      (!) STRESS CONCERN      11/7/2024   Nutrition   Diet: Regular (no restrictions)            11/7/2024   Exercise   Days per week of moderate/strenous exercise 3 days   Average minutes spent exercising at this level 30 min            11/7/2024   Social Factors   Frequency of gathering with friends or relatives Twice a week   Worry food won't last until get money to buy more No   Food not last or not have enough money for food? No   Do you have housing? (Housing is defined as stable permanent housing and does not include staying ouside in a car, in a tent, in an abandoned building, in an overnight shelter, or couch-surfing.) Yes   Are you worried about losing your housing? No   Lack of transportation? No   Unable to get utilities (heat,electricity)? No            11/7/2024   Fall Risk   Fallen 2 or more times in the past year? No    Trouble with walking or balance? " No        Patient-reported          11/7/2024   Activities of Daily Living- Home Safety   Needs help with the following daily activites None of the above   Safety concerns in the home None of the above            11/7/2024   Dental   Dentist two times every year? Yes            11/7/2024   Hearing Screening   Hearing concerns? None of the above            11/7/2024   Driving Risk Screening   Patient/family members have concerns about driving No            11/7/2024   General Alertness/Fatigue Screening   Have you been more tired than usual lately? No            11/7/2024   Urinary Incontinence Screening   Bothered by leaking urine in past 6 months No            11/7/2024   TB Screening   Were you born outside of the [place]? No            Today's PHQ-2 Score:       11/12/2024     7:09 AM   PHQ-2 ( 1999 [employer])   Q1: Little interest or pleasure in doing things 0    Q2: Feeling down, depressed or hopeless 0    PHQ-2 Score 0    Q1: Little interest or pleasure in doing things Not at all   Q2: Feeling down, depressed or hopeless Not at all   PHQ-2 Score 0       Patient-reported           11/7/2024   Substance Use   Alcohol more than 3/day or more than 7/wk No   Do you have a current opioid prescription? No   How severe/bad is pain from 1 to 10? 0/10 (No Pain)   Do you use any other substances recreationally? No        Social History     Tobacco Use    Smoking status: Never     Passive exposure: Never    Smokeless tobacco: Never   Vaping Use    Vaping status: Never Used   Substance Use Topics    Alcohol use: No    Drug use: No           12/4/2023   LAST FHS-7 RESULTS   1st degree relative breast or ovarian cancer Yes   Any relative bilateral breast cancer Yes   Any male have breast cancer No   Any ONE woman have BOTH breast AND ovarian cancer No   Any woman with breast cancer before 50yrs Yes   2 or more relatives with breast AND/OR ovarian cancer Yes   2 or more relatives with breast AND/OR bowel cancer Yes          "  Mammogram Screening - Mammogram every 1-2 years updated in Health Maintenance based on mutual decision making    ASCVD Risk   The 10-year ASCVD risk score (Ben MONTGOMERY, et al., 2019) is: 15.1%    Values used to calculate the score:      Age: 73 years      Sex: Female      Is Non- : No      Diabetic: No      Tobacco smoker: No      Systolic Blood Pressure: 135 mmHg      Is BP treated: Yes      HDL Cholesterol: 50 mg/dL      Total Cholesterol: 171 mg/dL          Reviewed and updated as needed this visit by Provider   Tobacco  Allergies  Meds  Problems  Med Hx  Surg Hx  Fam Hx            Current providers sharing in care for this patient include:  Patient Care Team:  Sherlyn Tolentino DO as PCP - General (Family Medicine)  Pamela Awan DO as Assigned PCP    The following health maintenance items are reviewed in Epic and correct as of today:  Health Maintenance   Topic Date Due    ANNUAL REVIEW OF HM ORDERS  11/07/2023    COVID-19 Vaccine (8 - 2024-25 season) 12/10/2024    BMP  02/14/2025    LIPID  02/14/2025    COLORECTAL CANCER SCREENING  08/06/2025    MEDICARE ANNUAL WELLNESS VISIT  11/12/2025    FALL RISK ASSESSMENT  11/12/2025    DTAP/TDAP/TD IMMUNIZATION (2 - Td or Tdap) 11/17/2025    MAMMO SCREENING  12/04/2025    GLUCOSE  02/14/2027    ADVANCE CARE PLANNING  11/10/2028    DEXA  12/06/2038    HEPATITIS C SCREENING  Completed    PHQ-2 (once per calendar year)  Completed    INFLUENZA VACCINE  Completed    Pneumococcal Vaccine: 65+ Years  Completed    ZOSTER IMMUNIZATION  Completed    RSV VACCINE  Completed    HPV IMMUNIZATION  Aged Out    MENINGITIS IMMUNIZATION  Aged Out    RSV MONOCLONAL ANTIBODY  Aged Out        Objective    Exam  /73 (BP Location: Left arm, Patient Position: Sitting, Cuff Size: Adult Regular)   Pulse 78   Resp 16   Ht 1.594 m (5' 2.75\")   Wt 70.7 kg (155 lb 12.8 oz)   SpO2 100%   BMI 27.82 kg/m     Estimated body mass index is 27.82 kg/m  " "as calculated from the following:    Height as of this encounter: 1.594 m (5' 2.75\").    Weight as of this encounter: 70.7 kg (155 lb 12.8 oz).    Physical Exam  GENERAL: alert and no distress  EYES: Eyes grossly normal to inspection, PERRL and conjunctivae and sclerae normal  HENT: ear canals and TM's normal, nose and mouth without ulcers or lesions  NECK: no adenopathy, no asymmetry, masses, or scars  RESP: lungs clear to auscultation - no rales, rhonchi or wheezes  CV: regular rate and rhythm, normal S1 S2, no S3 or S4, no murmur, click or rub, no peripheral edema  ABDOMEN: soft, nontender  MS: Arthritic changes bilateral hands with ulnar curvature, tenderness to palpation without erythema bilateral great toe MTP  SKIN: no suspicious lesions or rashes  NEURO: Normal strength and tone, mentation intact and speech normal  PSYCH: mentation appears normal, affect normal/bright        11/12/2024   Mini Cog   Clock Draw Score 2 Normal   3 Item Recall 3 objects recalled   Mini Cog Total Score 5                 Signed Electronically by: Sherlyn Tolentino DO    "

## 2024-11-12 NOTE — PATIENT INSTRUCTIONS
Patient Education   Preventive Care Advice   This is general advice given by our system to help you stay healthy. However, your care team may have specific advice just for you. Please talk to your care team about your preventive care needs.  Nutrition  Eat 5 or more servings of fruits and vegetables each day.  Try wheat bread, brown rice and whole grain pasta (instead of white bread, rice, and pasta).  Get enough calcium and vitamin D. Check the label on foods and aim for 100% of the RDA (recommended daily allowance).  Lifestyle  Exercise at least 150 minutes each week  (30 minutes a day, 5 days a week).  Do muscle strengthening activities 2 days a week. These help control your weight and prevent disease.  No smoking.  Wear sunscreen to prevent skin cancer.  Have a dental exam and cleaning every 6 months.  Yearly exams  See your health care team every year to talk about:  Any changes in your health.  Any medicines your care team has prescribed.  Preventive care, family planning, and ways to prevent chronic diseases.  Shots (vaccines)   HPV shots (up to age 26), if you've never had them before.  Hepatitis B shots (up to age 59), if you've never had them before.  COVID-19 shot: Get this shot when it's due.  Flu shot: Get a flu shot every year.  Tetanus shot: Get a tetanus shot every 10 years.  Pneumococcal, hepatitis A, and RSV shots: Ask your care team if you need these based on your risk.  Shingles shot (for age 50 and up)  General health tests  Diabetes screening:  Starting at age 35, Get screened for diabetes at least every 3 years.  If you are younger than age 35, ask your care team if you should be screened for diabetes.  Cholesterol test: At age 39, start having a cholesterol test every 5 years, or more often if advised.  Bone density scan (DEXA): At age 50, ask your care team if you should have this scan for osteoporosis (brittle bones).  Hepatitis C: Get tested at least once in your life.  STIs (sexually  transmitted infections)  Before age 24: Ask your care team if you should be screened for STIs.  After age 24: Get screened for STIs if you're at risk. You are at risk for STIs (including HIV) if:  You are sexually active with more than one person.  You don't use condoms every time.  You or a partner was diagnosed with a sexually transmitted infection.  If you are at risk for HIV, ask about PrEP medicine to prevent HIV.  Get tested for HIV at least once in your life, whether you are at risk for HIV or not.  Cancer screening tests  Cervical cancer screening: If you have a cervix, begin getting regular cervical cancer screening tests starting at age 21.  Breast cancer scan (mammogram): If you've ever had breasts, begin having regular mammograms starting at age 40. This is a scan to check for breast cancer.  Colon cancer screening: It is important to start screening for colon cancer at age 45.  Have a colonoscopy test every 10 years (or more often if you're at risk) Or, ask your provider about stool tests like a FIT test every year or Cologuard test every 3 years.  To learn more about your testing options, visit:   .  For help making a decision, visit:   https://bit.ly/ef76758.  Prostate cancer screening test: If you have a prostate, ask your care team if a prostate cancer screening test (PSA) at age 55 is right for you.  Lung cancer screening: If you are a current or former smoker ages 50 to 80, ask your care team if ongoing lung cancer screenings are right for you.  For informational purposes only. Not to replace the advice of your health care provider. Copyright   2023 [employer]. All rights reserved. Clinically reviewed by the [facility]. [employer] [id number] - REV 01/24.

## 2024-11-13 LAB
EST. AVERAGE GLUCOSE BLD GHB EST-MCNC: 117 MG/DL
HBA1C MFR BLD: 5.7 % (ref 0–5.6)

## 2024-12-02 ENCOUNTER — HOSPITAL ENCOUNTER (OUTPATIENT)
Dept: MAMMOGRAPHY | Facility: CLINIC | Age: 74
Discharge: HOME | End: 2024-12-02
Attending: FAMILY MEDICINE | Admitting: FAMILY MEDICINE
Payer: COMMERCIAL

## 2024-12-02 DIAGNOSIS — Z12.31 VISIT FOR SCREENING MAMMOGRAM: ICD-10-CM

## 2024-12-02 PROCEDURE — 77063 BREAST TOMOSYNTHESIS BI: CPT

## 2024-12-04 DIAGNOSIS — R23.2 HOT FLASHES: ICD-10-CM

## 2024-12-04 RX ORDER — ESTRADIOL 0.5 MG/1
TABLET ORAL
Qty: 45 TABLET | Refills: 2 | Status: SHIPPED | OUTPATIENT
Start: 2024-12-04

## 2025-08-04 ENCOUNTER — OFFICE VISIT (OUTPATIENT)
Dept: FAMILY MEDICINE | Facility: CLINIC | Age: 75
End: 2025-08-04
Payer: COMMERCIAL

## 2025-08-04 ENCOUNTER — TELEPHONE (OUTPATIENT)
Dept: FAMILY MEDICINE | Facility: CLINIC | Age: 75
End: 2025-08-04

## 2025-08-04 VITALS
WEIGHT: 159.4 LBS | HEIGHT: 62 IN | HEART RATE: 78 BPM | OXYGEN SATURATION: 100 % | TEMPERATURE: 98.2 F | SYSTOLIC BLOOD PRESSURE: 139 MMHG | DIASTOLIC BLOOD PRESSURE: 71 MMHG | RESPIRATION RATE: 16 BRPM | BODY MASS INDEX: 29.33 KG/M2

## 2025-08-04 DIAGNOSIS — H25.9 AGE-RELATED CATARACT OF BOTH EYES, UNSPECIFIED AGE-RELATED CATARACT TYPE: ICD-10-CM

## 2025-08-04 DIAGNOSIS — M54.50 ACUTE RIGHT-SIDED LOW BACK PAIN WITHOUT SCIATICA: ICD-10-CM

## 2025-08-04 DIAGNOSIS — E78.2 MIXED HYPERLIPIDEMIA: ICD-10-CM

## 2025-08-04 DIAGNOSIS — Z01.818 PREOP GENERAL PHYSICAL EXAM: Primary | ICD-10-CM

## 2025-08-04 DIAGNOSIS — R23.2 HOT FLASHES: ICD-10-CM

## 2025-08-04 DIAGNOSIS — R73.03 PREDIABETES: ICD-10-CM

## 2025-08-04 DIAGNOSIS — I10 ESSENTIAL HYPERTENSION: ICD-10-CM

## 2025-08-04 PROCEDURE — 3078F DIAST BP <80 MM HG: CPT | Performed by: FAMILY MEDICINE

## 2025-08-04 PROCEDURE — 99214 OFFICE O/P EST MOD 30 MIN: CPT | Performed by: FAMILY MEDICINE

## 2025-08-04 PROCEDURE — G2211 COMPLEX E/M VISIT ADD ON: HCPCS | Performed by: FAMILY MEDICINE

## 2025-08-04 PROCEDURE — 3075F SYST BP GE 130 - 139MM HG: CPT | Performed by: FAMILY MEDICINE

## 2025-08-04 RX ORDER — NAPROXEN 500 MG/1
500 TABLET ORAL 2 TIMES DAILY PRN
Qty: 60 TABLET | Refills: 1 | Status: SHIPPED | OUTPATIENT
Start: 2025-08-04

## 2025-08-05 ENCOUNTER — MYC MEDICAL ADVICE (OUTPATIENT)
Dept: FAMILY MEDICINE | Facility: CLINIC | Age: 75
End: 2025-08-05
Payer: COMMERCIAL

## 2025-08-12 ENCOUNTER — TELEPHONE (OUTPATIENT)
Dept: FAMILY MEDICINE | Facility: CLINIC | Age: 75
End: 2025-08-12
Payer: COMMERCIAL